# Patient Record
Sex: MALE | Race: WHITE | NOT HISPANIC OR LATINO | Employment: OTHER | URBAN - METROPOLITAN AREA
[De-identification: names, ages, dates, MRNs, and addresses within clinical notes are randomized per-mention and may not be internally consistent; named-entity substitution may affect disease eponyms.]

---

## 2018-05-30 ENCOUNTER — CONVERSION ENCOUNTER (OUTPATIENT)
Dept: SURGERY | Facility: CLINIC | Age: 68
End: 2018-05-30

## 2018-05-30 ENCOUNTER — OFFICE VISIT CONVERTED (OUTPATIENT)
Dept: UROLOGY | Facility: CLINIC | Age: 68
End: 2018-05-30
Attending: UROLOGY

## 2019-07-29 ENCOUNTER — HOSPITAL ENCOUNTER (OUTPATIENT)
Dept: PERIOP | Facility: HOSPITAL | Age: 69
Setting detail: HOSPITAL OUTPATIENT SURGERY
Discharge: HOME | End: 2019-07-29
Attending: UROLOGY

## 2019-07-29 LAB — GLUCOSE BLD-MCNC: 159 MG/DL (ref 70–99)

## 2019-08-19 ENCOUNTER — HOSPITAL ENCOUNTER (OUTPATIENT)
Dept: PERIOP | Facility: HOSPITAL | Age: 69
Setting detail: HOSPITAL OUTPATIENT SURGERY
Discharge: HOME | End: 2019-08-19
Attending: UROLOGY

## 2019-08-19 LAB
GLUCOSE BLD-MCNC: 179 MG/DL (ref 70–99)
GLUCOSE BLD-MCNC: 193 MG/DL (ref 70–99)

## 2019-09-23 ENCOUNTER — HOSPITAL ENCOUNTER (OUTPATIENT)
Dept: ULTRASOUND IMAGING | Facility: HOSPITAL | Age: 69
Discharge: HOME | End: 2019-09-23
Attending: UROLOGY

## 2019-09-25 ENCOUNTER — CONVERSION ENCOUNTER (OUTPATIENT)
Dept: SURGERY | Facility: CLINIC | Age: 69
End: 2019-09-25

## 2019-09-25 ENCOUNTER — OFFICE VISIT CONVERTED (OUTPATIENT)
Dept: UROLOGY | Facility: CLINIC | Age: 69
End: 2019-09-25
Attending: UROLOGY

## 2020-10-30 ENCOUNTER — TELEPHONE CONVERTED (OUTPATIENT)
Dept: UROLOGY | Facility: CLINIC | Age: 70
End: 2020-10-30
Attending: UROLOGY

## 2020-12-02 ENCOUNTER — HOSPITAL ENCOUNTER (OUTPATIENT)
Dept: PREADMISSION TESTING | Facility: HOSPITAL | Age: 70
Discharge: HOME | End: 2020-12-02
Attending: UROLOGY

## 2020-12-04 LAB — SARS-COV-2 RNA SPEC QL NAA+PROBE: NOT DETECTED

## 2020-12-07 ENCOUNTER — HOSPITAL ENCOUNTER (OUTPATIENT)
Dept: PERIOP | Facility: HOSPITAL | Age: 70
Setting detail: HOSPITAL OUTPATIENT SURGERY
Discharge: HOME | End: 2020-12-07
Attending: UROLOGY

## 2020-12-07 LAB
GLUCOSE BLD-MCNC: 164 MG/DL (ref 70–99)
GLUCOSE BLD-MCNC: 173 MG/DL (ref 70–99)

## 2020-12-10 LAB
BACTERIA UR CULT: ABNORMAL
CIPROFLOXACIN SUSC ISLT: <=0.5
DAPTOMYCIN SUSC ISLT: 0.5
DOXYCYCLINE SUSC ISLT: 1
ERYTHROMYCIN SUSC ISLT: 0.5
GENTAMICIN SUSC ISLT: <=0.5
LEVOFLOXACIN SUSC ISLT: <=0.12
NITROFURANTOIN SUSC ISLT: 32
OXACILLIN SUSC ISLT: <=0.25
RIFAMPIN SUSC ISLT: <=0.5
TETRACYCLINE SUSC ISLT: 8
TIGECYCLINE SUSC ISLT: 0.25
TMP SMX SUSC ISLT: 40
VANCOMYCIN SUSC ISLT: 1

## 2020-12-16 LAB
AMM URATE CRY STONE QL IR: 40 %
COLOR STONE: NORMAL
COMPN STONE: NORMAL
CONV CALCULI COMMENT: NORMAL
CONV CALCULI DISCLAIMER: NORMAL
CONV CALCULI NOTE: NORMAL
CONV PHOTO (CALCULI): NORMAL
SIZE STONE: NORMAL MM
URATE MFR STONE: 60 %
WT STONE: 40 MG

## 2020-12-23 ENCOUNTER — TELEPHONE CONVERTED (OUTPATIENT)
Dept: UROLOGY | Facility: CLINIC | Age: 70
End: 2020-12-23
Attending: UROLOGY

## 2021-02-20 ENCOUNTER — HOSPITAL ENCOUNTER (OUTPATIENT)
Dept: OTHER | Facility: HOSPITAL | Age: 71
Discharge: HOME | End: 2021-02-20
Attending: UROLOGY

## 2021-02-20 LAB
ANION GAP SERPL CALC-SCNC: 16 MMOL/L (ref 8–19)
APPEARANCE UR: ABNORMAL
BILIRUB UR QL: NEGATIVE
BUN SERPL-MCNC: 24 MG/DL (ref 5–25)
BUN/CREAT SERPL: 21 {RATIO} (ref 6–20)
CALCIUM SERPL-MCNC: 8.9 MG/DL (ref 8.7–10.4)
CHLORIDE SERPL-SCNC: 103 MMOL/L (ref 99–111)
COLOR UR: YELLOW
CONV BACTERIA: ABNORMAL
CONV CO2: 23 MMOL/L (ref 22–32)
CONV COLLECTION SOURCE (UA): ABNORMAL
CONV HYALINE CASTS IN URINE MICRO: ABNORMAL /[LPF]
CONV UROBILINOGEN IN URINE BY AUTOMATED TEST STRIP: 0.2 {EHRLICHU}/DL (ref 0.1–1)
CREAT UR-MCNC: 1.15 MG/DL (ref 0.7–1.2)
GFR SERPLBLD BASED ON 1.73 SQ M-ARVRAT: >60 ML/MIN/{1.73_M2}
GLUCOSE SERPL-MCNC: 191 MG/DL (ref 70–99)
GLUCOSE UR QL: NEGATIVE MG/DL
HGB UR QL STRIP: ABNORMAL
KETONES UR QL STRIP: ABNORMAL MG/DL
LEUKOCYTE ESTERASE UR QL STRIP: ABNORMAL
NITRITE UR QL STRIP: POSITIVE
OSMOLALITY SERPL CALC.SUM OF ELEC: 295 MOSM/KG (ref 273–304)
PH UR STRIP.AUTO: 8 [PH] (ref 5–8)
POTASSIUM SERPL-SCNC: 4.3 MMOL/L (ref 3.5–5.3)
PROT UR QL: 100 MG/DL
RBC #/AREA URNS HPF: ABNORMAL /[HPF]
SODIUM SERPL-SCNC: 138 MMOL/L (ref 135–147)
SP GR UR: 1.02 (ref 1–1.03)
WBC #/AREA URNS HPF: ABNORMAL /[HPF]

## 2021-02-22 LAB
AMPICILLIN SUSC ISLT: >=32
AMPICILLIN+SULBAC SUSC ISLT: >=32
BACTERIA UR CULT: ABNORMAL
CEFAZOLIN SUSC ISLT: >=64
CEFEPIME SUSC ISLT: <=0.12
CEFTAZIDIME SUSC ISLT: <=1
CEFTRIAXONE SUSC ISLT: <=0.25
CIPROFLOXACIN SUSC ISLT: <=0.25
ERTAPENEM SUSC ISLT: <=0.12
GENTAMICIN SUSC ISLT: <=1
LEVOFLOXACIN SUSC ISLT: 0.5
NITROFURANTOIN SUSC ISLT: 128
PIP+TAZO SUSC ISLT: <=4
TMP SMX SUSC ISLT: <=20
TOBRAMYCIN SUSC ISLT: <=1

## 2021-05-13 NOTE — PROGRESS NOTES
Progress Note      Patient Name: Tate Kelley   Patient ID: [id number]   Sex: Male   Birthdate: Helen 15, 1950        Visit Date: October 30, 2020    Provider: Sarah Maya MD   Location: [facility]   Location Address: [address] [place] [address]  [id number]   Location Phone: [phone]          Chief Complaint  · Pt is here for urological concerns     Telephone Visit- presents for evaluation via telephone.   Verbal consent obtained before beginning visit.   The following staff were present during this visit: Neelima Bocanegra LPN  Total time for encounter: 15 minutes       History Of Present Illness     69-year-old morbidly obese gentleman with numerous medical comorbidities including sleep apnea, COPD, dyslipidemia, hypertension, GERD, spinal stenosis, chronic constipation, report of CKD stage III, and left above-knee amputation for necrotizing fasciitis presents for further follow-up status post left ureteroscopy laser lithotripsy x2 for obstructive ureteral calculi, in July and August.  Patient subsequently underwent stent removal in OR and presents with renal ultrasound prior to today's appointment. Patient states that he has been doing well since stent removal.  Currently denies any urinary symptoms.  Denies hematuria or flank pain.  No complaints today.    Update 10/30/20: Presents for annual appt. States he is doing well from a stone standpoint; doesn't think he has any stones. Has not passed any since last visit. Notes a lingering intermittent right sided discomfort right under rib cage; uncertain why, doesn't think it is associated with stone pain. On occasion urine will be dark to light, associated with liquid intake. Unsure if he is emptying. Doesn't want Flomax, took before and it increased frequency of urination.   Saw nephrology; reports no change in meds or general mgt; will see annually.    _______________  Stone composition:  7/14/2019: 100% uric acid  2/1/2018: 88% uric  acid, 10% calcium oxalate monohydrate    Urine pH:  8/8/2019: 6.5  7/14/2019: 7.0  5/30/2018: 7.5  1/27/2018: 5.0  1/26/2018: 6.0  3/18/2016: 7.5  3/7/2016: 5.5    Creatinine 8/12/2019: 1.13; BUN: 17               Allergy List    Allergies Reconciled  Review of Systems  · Gastrointestinal  o Denies  o : nausea, vomiting, diarrhea  · Genitourinary  o Admits  o : kidney stones              Assessment  · Uric acid kidney stone     274.11/N20.0    Problems Reconciled  Plan  · Orders  o Physician Telephone Evaluation, 11-20 minutes (90805) - 274.11/N20.0 - 10/30/2020  · Medications  o Medications have been Reconciled  o Transition of Care or Provider Policy  · Instructions  o Electronically Identified Patient Education Materials Provided Electronically     Generally doing well  BPH- discussed timed and double voiding; encouraged patient to arrange follow-up if urinary symptoms worsen.  Wishes to avoid medical management    Uric acid stones- discussed again dietary modifications for prevention of stone growth and recurrence including increased hydration, decrease sodium, normal calcium, low animal protein diet.  Patient to maintain urine pH greater than 5.5.  Discussed that pH strips are available at the pharmacy and encourage follow-up with nephrology per routine.    Patient to notify office should he have severe flank pain, persistent vomiting, believing that he is passing stone CT would be obtained.    - patient to f/u nephrology; per routine    all questions addressed    Patient to follow-up in 1 year or earlier as needed               Electronically Signed by: Sarah Maya MD -Author on October 30, 2020 04:54:32 PM

## 2021-05-14 NOTE — PROGRESS NOTES
Progress Note      Patient Name: Tate Kelley   Patient ID: [id number]   Sex: Male   Birthdate: Helen 15, 1950        Visit Date: December 23, 2020    Provider: Sarah Maya MD   Location: [facility]   Location Address: [address] [place] [address]  [id number]   Location Phone: [phone]          Chief Complaint  · Pt is here for urological concerns     Telephone Visit- presents for evaluation via telephone.   Verbal consent obtained before beginning visit.   The following staff were present during this visit: Neelima Bocanegra LPN  Total time for encounter: 15 minutes       History Of Present Illness     69-year-old morbidly obese gentleman with numerous medical comorbidities including sleep apnea, COPD, dyslipidemia, hypertension, GERD, spinal stenosis, chronic constipation, report of CKD stage III, and left above-knee amputation for necrotizing fasciitis presents for further follow-up status post left ureteroscopy laser lithotripsy x2 for obstructive ureteral calculi, in July and August.  Patient subsequently underwent stent removal in OR and presents with renal ultrasound prior to today's appointment. Patient states that he has been doing well since stent removal.  Currently denies any urinary symptoms.  Denies hematuria or flank pain.  No complaints today.    Update 10/30/20: Presents for annual appt. States he is doing well from a stone standpoint; doesn't think he has any stones. Has not passed any since last visit. Notes a lingering intermittent right sided discomfort right under rib cage; uncertain why, doesn't think it is associated with stone pain. On occasion urine will be dark to light, associated with liquid intake. Unsure if he is emptying. Doesn't want Flomax, took before and it increased frequency of urination.   Saw nephrology; reports no change in meds or general mgt; will see annually.    Update 12/23/2020: Patient presents for follow-up after left ureteroscopy, laser  "lithotripsy and stent.  Patient remains with stent in place.  Intrarenal collecting system with challenging visibility due to large amount of intrarenal sediment and dust in all poles; fragments generally very fragile.    Sent home on potassium citrate BID and was instructed to have labs prior to today's visit; had performed at [facility], patient has those results. Never started potassium citrate.   Reports doing \"really good.\"  States Flomax helps his urination; request refill.  Stent remains in place.   Per patient, labs 12/9/20: creatinine 1.23; potassium 4.1; unable to locate urine pH      _______________  Stone composition:  12/16/2020: Uric acid 60%; ammonia acid urate 40%  7/14/2019: 100% uric acid  2/1/2018: 88% uric acid, 10% calcium oxalate monohydrate    Urine pH:  8/8/2019: 6.5  7/14/2019: 7.0  5/30/2018: 7.5  1/27/2018: 5.0  1/26/2018: 6.0  3/18/2016: 7.5  3/7/2016: 5.5    Creatinine 8/12/2019: 1.13; BUN: 17               Past Medical History  DM2 (diabetes mellitus, type 2); Flesh-eating bacteria; Heart Attack; Stroke; Uric acid kidney stone         Past Surgical History  Amputation; Foot surgery; Hernia; Kidney Stone Surgery, Unspecified; Knee surgery         Medication List  Flomax 0.4 mg oral capsule; glipizide 5 mg oral tablet; lisinopril 10 mg oral tablet; metformin 500 mg oral tablet; potassium citrate 10 mEq (1,080 mg) oral tablet extended release         Allergy List  Latex       Allergies Reconciled  Family Medical History  Hypertension; Heart Attack (MI); Prostate cancer; Leukemia         Social History  Tobacco (Never)         Review of Systems  · Constitutional  o Denies  o : fever, chills  · Gastrointestinal  o Denies  o : nausea, vomiting              Assessment  · Uric acid kidney stone     274.11/N20.0    Problems Reconciled  Plan  · Orders  o Physician Telephone Evaluation, 11-20 minutes (22640, [id number]) - 274.11/N20.0 - 12/23/2020  o BMP Non-fasting [facility] (50750) - 274.11/N20.0 - " 01/13/2021  o Urinalysis with Reflex Microscopy if abnormal ([facility]) ([id number]) - 274.11/N20.0 - 01/13/2021  · Medications  o Medications have been Reconciled  o Transition of Care or Provider Policy  · Instructions  o Electronically Identified Patient Education Materials Provided Electronically     Operative findings discussed with patient; left ureteral stent in place due to ample sediment and dust.  Patient agreeable to trial of dissolution therapy via potassium citrate twice daily; notes understanding that this will require routine lab checks of potassium level as well as urine pH.   Aware that due to his comorbidities, medical mgt of stone dz may be limited but agreeable to trial    Start urocitK BID  Check BMP and urinalysis in 3 weeks  Maintain left ureteral stent until follow-up      BPH- discussed timed and double voiding; continue Flomax, refill sent    Follow-up phone visit in 1 month  all questions addressed               Electronically Signed by: Sarah Maya MD -Author on December 23, 2020 01:38:16 PM

## 2021-05-15 VITALS — RESPIRATION RATE: 14 BRPM | WEIGHT: 315 LBS | HEIGHT: 73 IN | BODY MASS INDEX: 41.75 KG/M2

## 2021-05-16 VITALS — RESPIRATION RATE: 16 BRPM | WEIGHT: 315 LBS | BODY MASS INDEX: 41.75 KG/M2 | HEIGHT: 73 IN

## 2021-06-15 RX ORDER — GLIPIZIDE 5 MG/1
TABLET ORAL
COMMUNITY

## 2021-06-15 RX ORDER — LISINOPRIL 10 MG/1
TABLET ORAL
COMMUNITY
End: 2022-04-19 | Stop reason: ALTCHOICE

## 2021-06-15 RX ORDER — POTASSIUM CITRATE 10 MEQ/1
TABLET, EXTENDED RELEASE ORAL
Status: ON HOLD | COMMUNITY
Start: 2020-12-23 | End: 2021-06-28

## 2021-06-15 RX ORDER — TAMSULOSIN HYDROCHLORIDE 0.4 MG/1
CAPSULE ORAL
Status: ON HOLD | COMMUNITY
Start: 2020-12-23 | End: 2021-06-28 | Stop reason: SDUPTHER

## 2021-06-15 NOTE — PROGRESS NOTES
"           UROLOGY OFFICE FOLLOW-UP NOTE    Subjective   HPI  Tate Kelley is a 71 y.o. male morbidly obese gentleman with numerous medical comorbidities including sleep apnea, COPD, dyslipidemia, hypertension, GERD, spinal stenosis, chronic constipation, report of CKD stage III, and left above-knee amputation for necrotizing fasciitis presents for further follow-up status post left ureteroscopy laser lithotripsy x2 for obstructive ureteral calculi, in July and August.  Patient subsequently underwent stent removal in OR and presents with renal ultrasound prior to today's appointment. Patient states that he has been doing well since stent removal.  Currently denies any urinary symptoms.  Denies hematuria or flank pain.  No complaints today.    Update 10/30/20: Presents for annual appt. States he is doing well from a stone standpoint; doesn't think he has any stones. Has not passed any since last visit. Notes a lingering intermittent right sided discomfort right under rib cage; uncertain why, doesn't think it is associated with stone pain. On occasion urine will be dark to light, associated with liquid intake. Unsure if he is emptying. Doesn't want Flomax, took before and it increased frequency of urination.   Saw nephrology; reports no change in meds or general mgt; will see annually.    Update 12/23/2020: Patient presents for follow-up after left ureteroscopy, laser lithotripsy and stent.  Patient remains with stent in place.  Intrarenal collecting system with challenging visibility due to large amount of intrarenal sediment and dust in all poles; fragments generally very fragile.    Sent home on potassium citrate BID and was instructed to have labs prior to today's visit; had performed at [facility], patient has those results. Never started potassium citrate.   Reports doing \"really good.\"  States Flomax helps his urination; request refill.  Stent remains in place.   Per patient, labs 12/9/20: creatinine " 1.23; potassium 4.1; unable to locate urine pH    Update 6/16/21: Presents for follow up after several missed appts; currently has left ureteral stent in place. Recently got back from vacation in [place]. No longer taking flomax.   Took potassium citrate for short time but reports some GI upset so stopped it. Did not have labs prior to today's visit.  Generally feels well.  Unsure when his next nephrology appointment is.      _______________  Stone composition:  12/16/2020: Uric acid 60%; ammonia acid urate 40%  7/14/2019: 100% uric acid  2/1/2018: 88% uric acid, 10% calcium oxalate monohydrate    Urine pH:  8/8/2019: 6.5  7/14/2019: 7.0  5/30/2018: 7.5  1/27/2018: 5.0  1/26/2018: 6.0  3/18/2016: 7.5  3/7/2016: 5.5    Creatinine 8/12/2019: 1.13; BUN: 17             No results found for this or any previous visit.      Medical History:  Past Medical History:   Diagnosis Date   • DM2 (diabetes mellitus, type 2) (CMS/[facility])    • Flesh-eating bacteria (CMS/[facility])    • Heart attack (CMS/[facility])    • Stroke (cerebrum) (CMS/[facility])    • Uric acid kidney stone 09/25/2019        Social History:  Social History     Socioeconomic History   • Marital status:      Spouse name: Not on file   • Number of children: Not on file   • Years of education: Not on file   • Highest education level: Not on file   Tobacco Use   • Smoking status: Never Smoker        Family History:  Family History   Problem Relation Age of Onset   • Hypertension Mother    • Leukemia Mother    • Heart attack Father    • Prostate cancer Father         60        Surgical History:  Past Surgical History:   Procedure Laterality Date   • AMPUTATION      LBN   • FOOT SURGERY     • HERNIA REPAIR     • KIDNEY STONE SURGERY     • KNEE SURGERY     • MOUTH SURGERY          Allergies:  Allergies   Allergen Reactions   • Saccharin Nausea And Vomiting   • Latex Rash        Current Medications:  Current Outpatient Medications   Medication Sig Dispense Refill   • citalopram  "(CeleXA) 20 MG tablet Take 20 mg by mouth Daily.     • metFORMIN ER (GLUCOPHAGE-XR) 500 MG 24 hr tablet      • potassium citrate (UROCIT-K) 10 MEQ (1080 MG) CR tablet potassium citrate 10 mEq (1,080 mg) oral tablet extended release take 1 tablet (10 meq) by oral route 2 times per day 12/23/2020  Active     • tamsulosin (Flomax) 0.4 MG capsule 24 hr capsule Flomax 0.4 mg oral capsule take 1 capsule (0.4 mg) by oral route once daily 1/2 hour following the same meal each day 12/23/2020  Active     • ALBUTEROL IN Inhale 1 puff.     • calcium acetate (PHOS BINDER,) 667 MG capsule capsule Take 2,400 mg by mouth 3 (Three) Times a Day.     • glipizide (GLUCOTROL) 5 MG tablet glipizide 5 mg oral tablet take 1 tablet (5 mg) by oral route once daily before a meal   Active     • lisinopril (PRINIVIL,ZESTRIL) 10 MG tablet lisinopril 10 mg oral tablet take 1/2 tab by oral route QD   Active     • loratadine (CLARITIN) 10 MG tablet Take 10 mg by mouth Daily.     • metFORMIN (GLUCOPHAGE) 500 MG tablet metformin 500 mg oral tablet take 1 tablet (500 mg) by oral route 2 times per day with morning and evening meals   Active     • metoprolol tartrate (LOPRESSOR) 25 MG tablet Take 25 mg by mouth Daily.     • ondansetron (ZOFRAN) 4 MG tablet Take 4 mg by mouth.     • promethazine (PHENERGAN) 12.5 MG tablet Take 12.5 mg by mouth.     • sodium bicarbonate 325 MG tablet Take 1,300 mg by mouth 3 (Three) Times a Day.     • sulfamethoxazole-trimethoprim (BACTRIM DS,SEPTRA DS) 800-160 MG per tablet        No current facility-administered medications for this visit.       Review of systems  Constitutional: Denies fever chills  GI: Denies nausea, vomiting    Objective     Vital Signs:   Resp 18   Ht 185.4 cm (72.99\")   BMI 43.55 kg/m²       Physical exam  No acute distress, well-nourished morbidly obese  Lungs: Clear, unlabored  CV: Regular rate, no chest retractions  Awake alert and oriented  Mood normal; affect normal  Extremities: left " aka    Problem List:  Patient Active Problem List   Diagnosis   • DM2 (diabetes mellitus, type 2) (CMS/[facility])   • Flesh-eating bacteria (CMS/[facility])   • Heart attack (CMS/[facility])   • GI bleed   • Stroke (CMS/[facility])   • Uric acid urolithiasis   • Nephrolithiasis   • Retained ureteral stent       Assessment/Plan   Diagnoses and all orders for this visit:    1. Uric acid urolithiasis (Primary)  -     Urinalysis With Microscopic If Indicated (No Culture) - Urine, Clean Catch; Future  -     Urinalysis With Microscopic If Indicated (No Culture) - Urine, Clean Catch    2. Dysuria  -     Urine Culture - Urine, Urine, Clean Catch; Future  -     Urinalysis With Microscopic If Indicated (No Culture) - Urine, Clean Catch; Future  -     Urine Culture - Urine, Urine, Clean Catch  -     Urinalysis With Microscopic If Indicated (No Culture) - Urine, Clean Catch           Patient with failed dissolution therapy; unable to tolerate or take potassium citrate for appropriate period of time for dissolution; discussed the importance of partnering with nephrology for medical optimization of metabolic stone disease, uric acid stone formation.  Notes understanding that without medical management for metabolic stone disease will likely have recurrence of obstructive uropathy due to uric acid stones.    Given that patient stent has been in for a prolonged period of time, recommend proceeding to the OR for cystoscopy left retrograde pyelogram, ureteroscopy, laser lithotripsy, and stent exchange versus removal.  Risk, benefits, and alternatives of the procedure discussed with patient including but not limited to bleeding, infection, damage to surrounding structures, pain, stone recurrence, stricture.  Patient also notes understanding that if unable to reach the level of the stone or if significant purulent discharge noted upon initiation of procedure that stent will be placed in definitive stone management will be deferred until the collecting system is  optimized.  Patient does understand that her stent is only a temporizing measure and if left in place could cause loss or damage of kidney     Will schedule OR.   All question addressed at this time.          Signed:  Sarah Palacios MD  06/16/21  16:14 EDT      [facility] moderate: 1 chronic illness with exacerbation, progression; decision regarding surgery with identified patient or procedure risk factors

## 2021-06-16 ENCOUNTER — OFFICE VISIT (OUTPATIENT)
Dept: UROLOGY | Facility: CLINIC | Age: 71
End: 2021-06-16

## 2021-06-16 ENCOUNTER — PREP FOR SURGERY (OUTPATIENT)
Dept: OTHER | Facility: HOSPITAL | Age: 71
End: 2021-06-16

## 2021-06-16 VITALS — HEIGHT: 73 IN | BODY MASS INDEX: 43.55 KG/M2 | RESPIRATION RATE: 18 BRPM

## 2021-06-16 DIAGNOSIS — N20.0 NEPHROLITHIASIS: Primary | ICD-10-CM

## 2021-06-16 DIAGNOSIS — R30.0 DYSURIA: ICD-10-CM

## 2021-06-16 DIAGNOSIS — Z96.0 RETAINED URETERAL STENT: ICD-10-CM

## 2021-06-16 DIAGNOSIS — N20.9 URIC ACID UROLITHIASIS: Primary | ICD-10-CM

## 2021-06-16 PROBLEM — I63.9 STROKE: Status: ACTIVE | Noted: 2021-06-16

## 2021-06-16 PROBLEM — M72.6 FLESH-EATING BACTERIA (HCC): Status: ACTIVE | Noted: 2021-06-16

## 2021-06-16 PROBLEM — I21.9 HEART ATTACK: Status: ACTIVE | Noted: 2021-06-16

## 2021-06-16 PROBLEM — E11.9 DM2 (DIABETES MELLITUS, TYPE 2): Status: ACTIVE | Noted: 2021-06-16

## 2021-06-16 PROCEDURE — 87147 CULTURE TYPE IMMUNOLOGIC: CPT | Performed by: UROLOGY

## 2021-06-16 PROCEDURE — 99214 OFFICE O/P EST MOD 30 MIN: CPT | Performed by: UROLOGY

## 2021-06-16 PROCEDURE — 87086 URINE CULTURE/COLONY COUNT: CPT | Performed by: UROLOGY

## 2021-06-16 RX ORDER — CEFAZOLIN SODIUM IN 0.9 % NACL 3 G/100 ML
3 INTRAVENOUS SOLUTION, PIGGYBACK (ML) INTRAVENOUS ONCE
Status: CANCELLED | OUTPATIENT
Start: 2021-06-16 | End: 2021-06-16

## 2021-06-16 RX ORDER — CALCIUM ACETATE 667 MG/1
2400 CAPSULE ORAL 3 TIMES DAILY
Status: ON HOLD | COMMUNITY
End: 2021-06-28

## 2021-06-16 RX ORDER — ONDANSETRON 4 MG/1
4 TABLET, FILM COATED ORAL
Status: ON HOLD | COMMUNITY
End: 2021-06-28

## 2021-06-16 RX ORDER — PROMETHAZINE HYDROCHLORIDE 12.5 MG/1
12.5 TABLET ORAL
Status: ON HOLD | COMMUNITY
End: 2021-06-28

## 2021-06-16 RX ORDER — SULFAMETHOXAZOLE AND TRIMETHOPRIM 800; 160 MG/1; MG/1
TABLET ORAL
Status: ON HOLD | COMMUNITY
Start: 2021-03-17 | End: 2021-06-28

## 2021-06-16 RX ORDER — CITALOPRAM 20 MG/1
20 TABLET ORAL DAILY
Status: ON HOLD | COMMUNITY
End: 2021-06-28

## 2021-06-16 RX ORDER — LORATADINE 10 MG/1
10 TABLET ORAL DAILY
Status: ON HOLD | COMMUNITY
End: 2021-06-28

## 2021-06-16 RX ORDER — METFORMIN HYDROCHLORIDE 500 MG/1
TABLET, EXTENDED RELEASE ORAL
Status: ON HOLD | COMMUNITY
Start: 2021-05-12 | End: 2021-06-28

## 2021-06-16 RX ORDER — SODIUM BICARBONATE 325 MG/1
1300 TABLET ORAL 3 TIMES DAILY
Status: ON HOLD | COMMUNITY
End: 2021-06-28

## 2021-06-17 LAB — BACTERIA SPEC AEROBE CULT: ABNORMAL

## 2021-06-24 RX ORDER — PSEUDOEPHEDRINE HYDROCHLORIDE 60 MG/1
60 TABLET, FILM COATED ORAL 2 TIMES DAILY PRN
COMMUNITY
End: 2022-04-19 | Stop reason: ALTCHOICE

## 2021-06-28 ENCOUNTER — HOSPITAL ENCOUNTER (OUTPATIENT)
Facility: HOSPITAL | Age: 71
Setting detail: HOSPITAL OUTPATIENT SURGERY
Discharge: HOME | End: 2021-06-28
Attending: UROLOGY | Admitting: UROLOGY

## 2021-06-28 ENCOUNTER — ANESTHESIA EVENT (OUTPATIENT)
Dept: PERIOP | Facility: HOSPITAL | Age: 71
End: 2021-06-28

## 2021-06-28 ENCOUNTER — ANESTHESIA (OUTPATIENT)
Dept: PERIOP | Facility: HOSPITAL | Age: 71
End: 2021-06-28

## 2021-06-28 ENCOUNTER — APPOINTMENT (OUTPATIENT)
Dept: GENERAL RADIOLOGY | Facility: HOSPITAL | Age: 71
End: 2021-06-28

## 2021-06-28 VITALS
SYSTOLIC BLOOD PRESSURE: 118 MMHG | WEIGHT: 315 LBS | BODY MASS INDEX: 42.66 KG/M2 | RESPIRATION RATE: 20 BRPM | OXYGEN SATURATION: 92 % | DIASTOLIC BLOOD PRESSURE: 60 MMHG | HEART RATE: 95 BPM | HEIGHT: 72 IN | TEMPERATURE: 97.1 F

## 2021-06-28 DIAGNOSIS — N20.0 NEPHROLITHIASIS: ICD-10-CM

## 2021-06-28 DIAGNOSIS — Z96.0 RETAINED URETERAL STENT: ICD-10-CM

## 2021-06-28 DIAGNOSIS — N20.0 URIC ACID KIDNEY STONE: Primary | ICD-10-CM

## 2021-06-28 LAB
ANION GAP SERPL CALCULATED.3IONS-SCNC: 9.7 MMOL/L (ref 5–15)
BUN SERPL-MCNC: 19 MG/DL (ref 8–23)
BUN/CREAT SERPL: 16.2 (ref 7–25)
CALCIUM SPEC-SCNC: 9.2 MG/DL (ref 8.6–10.5)
CHLORIDE SERPL-SCNC: 104 MMOL/L (ref 98–107)
CO2 SERPL-SCNC: 23.3 MMOL/L (ref 22–29)
CREAT SERPL-MCNC: 1.17 MG/DL (ref 0.76–1.27)
GFR SERPL CREATININE-BSD FRML MDRD: 62 ML/MIN/1.73
GLUCOSE BLDC GLUCOMTR-MCNC: 163 MG/DL (ref 70–130)
GLUCOSE SERPL-MCNC: 154 MG/DL (ref 65–99)
LAB AP CASE REPORT: NORMAL
LAB AP CLINICAL INFORMATION: NORMAL
PATH REPORT.FINAL DX SPEC: NORMAL
PATH REPORT.GROSS SPEC: NORMAL
POTASSIUM SERPL-SCNC: 4.3 MMOL/L (ref 3.5–5.2)
SODIUM SERPL-SCNC: 137 MMOL/L (ref 136–145)

## 2021-06-28 PROCEDURE — C1769 GUIDE WIRE: HCPCS | Performed by: UROLOGY

## 2021-06-28 PROCEDURE — 74420 UROGRAPHY RTRGR +-KUB: CPT | Performed by: RADIOLOGY

## 2021-06-28 PROCEDURE — 88300 SURGICAL PATH GROSS: CPT | Performed by: UROLOGY

## 2021-06-28 PROCEDURE — 25010000002 PROPOFOL 10 MG/ML EMULSION: Performed by: NURSE ANESTHETIST, CERTIFIED REGISTERED

## 2021-06-28 PROCEDURE — C1894 INTRO/SHEATH, NON-LASER: HCPCS | Performed by: UROLOGY

## 2021-06-28 PROCEDURE — 82962 GLUCOSE BLOOD TEST: CPT

## 2021-06-28 PROCEDURE — 52332 CYSTOSCOPY AND TREATMENT: CPT | Performed by: UROLOGY

## 2021-06-28 PROCEDURE — 80048 BASIC METABOLIC PNL TOTAL CA: CPT | Performed by: UROLOGY

## 2021-06-28 PROCEDURE — 25010000002 FENTANYL CITRATE (PF) 50 MCG/ML SOLUTION: Performed by: NURSE ANESTHETIST, CERTIFIED REGISTERED

## 2021-06-28 PROCEDURE — 25010000002 DEXAMETHASONE PER 1 MG: Performed by: NURSE ANESTHETIST, CERTIFIED REGISTERED

## 2021-06-28 PROCEDURE — 25010000002 METOCLOPRAMIDE PER 10 MG: Performed by: ANESTHESIOLOGY

## 2021-06-28 PROCEDURE — 25010000002 MIDAZOLAM PER 1MG: Performed by: ANESTHESIOLOGY

## 2021-06-28 PROCEDURE — 74420 UROGRAPHY RTRGR +-KUB: CPT

## 2021-06-28 PROCEDURE — 0 IOPAMIDOL PER 1 ML: Performed by: UROLOGY

## 2021-06-28 PROCEDURE — C1758 CATHETER, URETERAL: HCPCS | Performed by: UROLOGY

## 2021-06-28 PROCEDURE — 25010000002 ONDANSETRON PER 1 MG: Performed by: NURSE ANESTHETIST, CERTIFIED REGISTERED

## 2021-06-28 PROCEDURE — C2617 STENT, NON-COR, TEM W/O DEL: HCPCS | Performed by: UROLOGY

## 2021-06-28 PROCEDURE — 52352 CYSTOURETERO W/STONE REMOVE: CPT | Performed by: UROLOGY

## 2021-06-28 PROCEDURE — 82365 CALCULUS SPECTROSCOPY: CPT | Performed by: UROLOGY

## 2021-06-28 DEVICE — STNT URETRL CLASSC DBL PIG 6F 26CM: Type: IMPLANTABLE DEVICE | Site: URETER | Status: FUNCTIONAL

## 2021-06-28 RX ORDER — DEXAMETHASONE SODIUM PHOSPHATE 4 MG/ML
INJECTION, SOLUTION INTRA-ARTICULAR; INTRALESIONAL; INTRAMUSCULAR; INTRAVENOUS; SOFT TISSUE AS NEEDED
Status: DISCONTINUED | OUTPATIENT
Start: 2021-06-28 | End: 2021-06-28 | Stop reason: SURG

## 2021-06-28 RX ORDER — IBUPROFEN 600 MG/1
600 TABLET ORAL EVERY 6 HOURS PRN
Status: DISCONTINUED | OUTPATIENT
Start: 2021-06-28 | End: 2021-06-28 | Stop reason: HOSPADM

## 2021-06-28 RX ORDER — ALBUTEROL SULFATE 2.5 MG/3ML
SOLUTION RESPIRATORY (INHALATION) AS NEEDED
Status: DISCONTINUED | OUTPATIENT
Start: 2021-06-28 | End: 2021-06-28 | Stop reason: SURG

## 2021-06-28 RX ORDER — ACETAMINOPHEN 325 MG/1
650 TABLET ORAL ONCE
Status: DISCONTINUED | OUTPATIENT
Start: 2021-06-28 | End: 2021-06-28 | Stop reason: HOSPADM

## 2021-06-28 RX ORDER — TAMSULOSIN HYDROCHLORIDE 0.4 MG/1
1 CAPSULE ORAL DAILY
Qty: 90 CAPSULE | Refills: 1 | Status: SHIPPED | OUTPATIENT
Start: 2021-06-28 | End: 2022-04-19 | Stop reason: SDUPTHER

## 2021-06-28 RX ORDER — DIAZEPAM 10 MG/1
10 TABLET ORAL ONCE
Qty: 1 TABLET | Refills: 0 | Status: SHIPPED | OUTPATIENT
Start: 2021-06-28 | End: 2021-06-28

## 2021-06-28 RX ORDER — OXYCODONE HYDROCHLORIDE AND ACETAMINOPHEN 5; 325 MG/1; MG/1
1-2 TABLET ORAL EVERY 4 HOURS PRN
Qty: 14 TABLET | Refills: 0 | Status: SHIPPED | OUTPATIENT
Start: 2021-06-28 | End: 2022-04-19

## 2021-06-28 RX ORDER — FAMOTIDINE 10 MG/ML
20 INJECTION, SOLUTION INTRAVENOUS
Status: COMPLETED | OUTPATIENT
Start: 2021-06-28 | End: 2021-06-28

## 2021-06-28 RX ORDER — PROMETHAZINE HYDROCHLORIDE 25 MG/1
25 SUPPOSITORY RECTAL ONCE AS NEEDED
Status: DISCONTINUED | OUTPATIENT
Start: 2021-06-28 | End: 2021-06-28 | Stop reason: HOSPADM

## 2021-06-28 RX ORDER — ONDANSETRON 2 MG/ML
INJECTION INTRAMUSCULAR; INTRAVENOUS AS NEEDED
Status: DISCONTINUED | OUTPATIENT
Start: 2021-06-28 | End: 2021-06-28 | Stop reason: SURG

## 2021-06-28 RX ORDER — PROPOFOL 10 MG/ML
VIAL (ML) INTRAVENOUS AS NEEDED
Status: DISCONTINUED | OUTPATIENT
Start: 2021-06-28 | End: 2021-06-28 | Stop reason: SURG

## 2021-06-28 RX ORDER — DEXMEDETOMIDINE HYDROCHLORIDE 100 UG/ML
INJECTION, SOLUTION INTRAVENOUS AS NEEDED
Status: DISCONTINUED | OUTPATIENT
Start: 2021-06-28 | End: 2021-06-28 | Stop reason: SURG

## 2021-06-28 RX ORDER — MEPERIDINE HYDROCHLORIDE 25 MG/ML
12.5 INJECTION INTRAMUSCULAR; INTRAVENOUS; SUBCUTANEOUS
Status: DISCONTINUED | OUTPATIENT
Start: 2021-06-28 | End: 2021-06-28 | Stop reason: HOSPADM

## 2021-06-28 RX ORDER — SODIUM CHLORIDE, SODIUM LACTATE, POTASSIUM CHLORIDE, CALCIUM CHLORIDE 600; 310; 30; 20 MG/100ML; MG/100ML; MG/100ML; MG/100ML
9 INJECTION, SOLUTION INTRAVENOUS CONTINUOUS PRN
Status: DISCONTINUED | OUTPATIENT
Start: 2021-06-28 | End: 2021-06-28 | Stop reason: HOSPADM

## 2021-06-28 RX ORDER — LEVOFLOXACIN 500 MG/1
500 TABLET, FILM COATED ORAL DAILY
Qty: 10 TABLET | Refills: 0 | Status: SHIPPED | OUTPATIENT
Start: 2021-06-29 | End: 2021-07-09

## 2021-06-28 RX ORDER — ONDANSETRON 2 MG/ML
4 INJECTION INTRAMUSCULAR; INTRAVENOUS ONCE AS NEEDED
Status: DISCONTINUED | OUTPATIENT
Start: 2021-06-28 | End: 2021-06-28 | Stop reason: HOSPADM

## 2021-06-28 RX ORDER — SODIUM CHLORIDE 0.9 % (FLUSH) 0.9 %
10 SYRINGE (ML) INJECTION AS NEEDED
Status: DISCONTINUED | OUTPATIENT
Start: 2021-06-28 | End: 2021-06-28 | Stop reason: HOSPADM

## 2021-06-28 RX ORDER — FENTANYL CITRATE 50 UG/ML
INJECTION, SOLUTION INTRAMUSCULAR; INTRAVENOUS AS NEEDED
Status: DISCONTINUED | OUTPATIENT
Start: 2021-06-28 | End: 2021-06-28 | Stop reason: SURG

## 2021-06-28 RX ORDER — OXYCODONE HYDROCHLORIDE 5 MG/1
5 TABLET ORAL
Status: DISCONTINUED | OUTPATIENT
Start: 2021-06-28 | End: 2021-06-28 | Stop reason: HOSPADM

## 2021-06-28 RX ORDER — ROCURONIUM BROMIDE 10 MG/ML
INJECTION, SOLUTION INTRAVENOUS AS NEEDED
Status: DISCONTINUED | OUTPATIENT
Start: 2021-06-28 | End: 2021-06-28 | Stop reason: SURG

## 2021-06-28 RX ORDER — GLYCOPYRROLATE 0.2 MG/ML
0.2 INJECTION INTRAMUSCULAR; INTRAVENOUS
Status: COMPLETED | OUTPATIENT
Start: 2021-06-28 | End: 2021-06-28

## 2021-06-28 RX ORDER — CEFAZOLIN SODIUM IN 0.9 % NACL 3 G/100 ML
3 INTRAVENOUS SOLUTION, PIGGYBACK (ML) INTRAVENOUS ONCE
Status: COMPLETED | OUTPATIENT
Start: 2021-06-28 | End: 2021-06-28

## 2021-06-28 RX ORDER — ONDANSETRON 4 MG/1
4 TABLET, FILM COATED ORAL ONCE AS NEEDED
Status: DISCONTINUED | OUTPATIENT
Start: 2021-06-28 | End: 2021-06-28 | Stop reason: HOSPADM

## 2021-06-28 RX ORDER — SUCCINYLCHOLINE/SOD CL,ISO/PF 100 MG/5ML
SYRINGE (ML) INTRAVENOUS AS NEEDED
Status: DISCONTINUED | OUTPATIENT
Start: 2021-06-28 | End: 2021-06-28 | Stop reason: SURG

## 2021-06-28 RX ORDER — MIDAZOLAM HYDROCHLORIDE 1 MG/ML
2 INJECTION INTRAMUSCULAR; INTRAVENOUS ONCE
Status: COMPLETED | OUTPATIENT
Start: 2021-06-28 | End: 2021-06-28

## 2021-06-28 RX ORDER — MAGNESIUM HYDROXIDE 1200 MG/15ML
LIQUID ORAL AS NEEDED
Status: DISCONTINUED | OUTPATIENT
Start: 2021-06-28 | End: 2021-06-28 | Stop reason: HOSPADM

## 2021-06-28 RX ORDER — PROMETHAZINE HYDROCHLORIDE 12.5 MG/1
25 TABLET ORAL ONCE AS NEEDED
Status: DISCONTINUED | OUTPATIENT
Start: 2021-06-28 | End: 2021-06-28 | Stop reason: HOSPADM

## 2021-06-28 RX ORDER — METOCLOPRAMIDE HYDROCHLORIDE 5 MG/ML
10 INJECTION INTRAMUSCULAR; INTRAVENOUS ONCE AS NEEDED
Status: COMPLETED | OUTPATIENT
Start: 2021-06-28 | End: 2021-06-28

## 2021-06-28 RX ORDER — LABETALOL HYDROCHLORIDE 5 MG/ML
INJECTION, SOLUTION INTRAVENOUS AS NEEDED
Status: DISCONTINUED | OUTPATIENT
Start: 2021-06-28 | End: 2021-06-28 | Stop reason: SURG

## 2021-06-28 RX ORDER — PROMETHAZINE HYDROCHLORIDE 12.5 MG/1
12.5 TABLET ORAL ONCE AS NEEDED
Status: DISCONTINUED | OUTPATIENT
Start: 2021-06-28 | End: 2021-06-28 | Stop reason: HOSPADM

## 2021-06-28 RX ORDER — SODIUM CHLORIDE 0.9 % (FLUSH) 0.9 %
10 SYRINGE (ML) INJECTION EVERY 12 HOURS SCHEDULED
Status: DISCONTINUED | OUTPATIENT
Start: 2021-06-28 | End: 2021-06-28 | Stop reason: HOSPADM

## 2021-06-28 RX ORDER — ACETAMINOPHEN 500 MG
1000 TABLET ORAL ONCE
Status: COMPLETED | OUTPATIENT
Start: 2021-06-28 | End: 2021-06-28

## 2021-06-28 RX ORDER — ASPIRIN 325 MG
325 TABLET ORAL DAILY
COMMUNITY

## 2021-06-28 RX ADMIN — ONDANSETRON 4 MG: 2 INJECTION INTRAMUSCULAR; INTRAVENOUS at 08:11

## 2021-06-28 RX ADMIN — ACETAMINOPHEN 1000 MG: 500 TABLET ORAL at 07:16

## 2021-06-28 RX ADMIN — ROCURONIUM BROMIDE 10 MG: 10 INJECTION INTRAVENOUS at 07:41

## 2021-06-28 RX ADMIN — DEXMEDETOMIDINE HYDROCHLORIDE 10 MCG: 100 INJECTION, SOLUTION INTRAVENOUS at 08:13

## 2021-06-28 RX ADMIN — CEFAZOLIN 3 G: 1 INJECTION, POWDER, FOR SOLUTION INTRAMUSCULAR; INTRAVENOUS; PARENTERAL at 07:31

## 2021-06-28 RX ADMIN — GLYCOPYRROLATE 0.2 MG: 0.2 INJECTION INTRAMUSCULAR; INTRAVENOUS at 07:19

## 2021-06-28 RX ADMIN — FENTANYL CITRATE 100 MCG: 50 INJECTION INTRAMUSCULAR; INTRAVENOUS at 07:33

## 2021-06-28 RX ADMIN — ALBUTEROL SULFATE 2.5 MG: 2.5 SOLUTION RESPIRATORY (INHALATION) at 08:15

## 2021-06-28 RX ADMIN — SODIUM CHLORIDE, POTASSIUM CHLORIDE, SODIUM LACTATE AND CALCIUM CHLORIDE 9 ML/HR: 600; 310; 30; 20 INJECTION, SOLUTION INTRAVENOUS at 07:20

## 2021-06-28 RX ADMIN — FAMOTIDINE 20 MG: 10 INJECTION INTRAVENOUS at 07:19

## 2021-06-28 RX ADMIN — METOCLOPRAMIDE HYDROCHLORIDE 10 MG: 5 INJECTION INTRAMUSCULAR; INTRAVENOUS at 07:19

## 2021-06-28 RX ADMIN — DEXAMETHASONE SODIUM PHOSPHATE 4 MG: 4 INJECTION INTRA-ARTICULAR; INTRALESIONAL; INTRAMUSCULAR; INTRAVENOUS; SOFT TISSUE at 08:11

## 2021-06-28 RX ADMIN — Medication 200 MG: at 07:41

## 2021-06-28 RX ADMIN — LABETALOL 20 MG/4 ML (5 MG/ML) INTRAVENOUS SYRINGE 10 MG: at 07:40

## 2021-06-28 RX ADMIN — MIDAZOLAM HYDROCHLORIDE 2 MG: 1 INJECTION, SOLUTION INTRAMUSCULAR; INTRAVENOUS at 07:19

## 2021-06-28 RX ADMIN — PROPOFOL 300 MG: 10 INJECTION, EMULSION INTRAVENOUS at 07:41

## 2021-06-28 NOTE — ANESTHESIA POSTPROCEDURE EVALUATION
Patient: Tate Kelley    Procedure Summary     Date: 06/28/21 Room / Location: [facility] OR 07 / [facility] MAIN OR    Anesthesia Start: 0731 Anesthesia Stop: 0904    Procedure: CYSTOSCOPY URETEROSCOPY RETROGRADE PYELOGRAM STENT EXCHANGE LEFT (Left Ureter) Diagnosis:       Nephrolithiasis      Retained ureteral stent      (Nephrolithiasis [N20.0])      (Retained ureteral stent [Z96.0])    Surgeons: Sarah Palacios MD Provider: Haile Hoang MD    Anesthesia Type: general ASA Status: 4          Anesthesia Type: general    Vitals  Vitals Value Taken Time   BP 96/54 06/28/21 0924   Temp 36.7 °C (98.1 °F) 06/28/21 0902   Pulse 93 06/28/21 0927   Resp 18 06/28/21 0912   SpO2 93 % 06/28/21 0927   Vitals shown include unvalidated device data.        Post Anesthesia Care and Evaluation    Patient location during evaluation: bedside  Patient participation: complete - patient participated  Level of consciousness: awake  Pain score: 0  Pain management: adequate  Airway patency: patent  Anesthetic complications: No anesthetic complications  PONV Status: none  Cardiovascular status: acceptable and stable  Respiratory status: acceptable and room air  Hydration status: acceptable

## 2021-06-28 NOTE — ANESTHESIA PREPROCEDURE EVALUATION
Anesthesia Evaluation     Patient summary reviewed and Nursing notes reviewed   no history of anesthetic complications:  NPO Solid Status: > 8 hours  NPO Liquid Status: > 2 hours           Airway   Mallampati: III  TM distance: >3 FB  Neck ROM: full  No difficulty expected  Dental    (+) poor dentition    Pulmonary    (+) COPD, sleep apnea, rhonchi, wheezes,   Cardiovascular - normal exam  Exercise tolerance: good (4-7 METS)    Rhythm: regular    (+) past MI , hyperlipidemia,       Neuro/Psych  (+) CVA,     GI/Hepatic/Renal/Endo    (+) morbid obesity, GERD, GI bleeding , renal disease, diabetes mellitus,     Musculoskeletal     Abdominal   (+) obese,    Substance History - negative use     OB/GYN negative ob/gyn ROS         Other   arthritis,                      Anesthesia Plan    ASA 4     general   (Patient understands anesthesia not responsible for dental damage.)  intravenous induction     Anesthetic plan, all risks, benefits, and alternatives have been provided, discussed and informed consent has been obtained with: patient.  Use of blood products discussed with patient .   Plan discussed with CRNA.

## 2021-07-01 ENCOUNTER — TELEPHONE (OUTPATIENT)
Dept: UROLOGY | Facility: CLINIC | Age: 71
End: 2021-07-01

## 2021-07-01 DIAGNOSIS — R97.20 ELEVATED PROSTATE SPECIFIC ANTIGEN (PSA): Primary | ICD-10-CM

## 2021-07-02 ENCOUNTER — TELEPHONE (OUTPATIENT)
Dept: UROLOGY | Facility: CLINIC | Age: 71
End: 2021-07-02

## 2021-07-02 NOTE — TELEPHONE ENCOUNTER
Pt called in today, stating that he was instructed to see nephrology and wanted to know who in our office you would recommend. I told him we don't have nephrology in our office so he wanted to know who you suggested he see in regards to obtaining a nephrology consultation. He values your opinion and wants your recommendation for a nephrologist.

## 2021-07-09 LAB
AMM URATE MFR STONE: 90 %
COLOR STONE: NORMAL
COMPN STONE: NORMAL
LABORATORY COMMENT REPORT: NORMAL
Lab: NORMAL
Lab: NORMAL
PHOTO: NORMAL
SIZE STONE: NORMAL MM
SPEC SOURCE SUBJ: NORMAL
URATE MFR STONE: 10 %
WT STONE: 81 MG

## 2021-07-12 RX ORDER — DIAZEPAM 10 MG/1
TABLET ORAL
COMMUNITY
Start: 2021-06-28

## 2021-07-13 PROBLEM — T19.9XXA FOREIGN BODY IN GENITOURINARY TRACT: Status: ACTIVE | Noted: 2021-07-13

## 2021-07-13 PROBLEM — T19.9XXA FOREIGN BODY IN GENITOURINARY TRACT: Status: RESOLVED | Noted: 2021-07-13 | Resolved: 2021-07-13

## 2021-07-13 NOTE — PROGRESS NOTES
Preoperative diagnosis  Foreign body in genitourinary tract; hydronephrosis    Postoperative diagnosis  Same as above    Procedure  Flexible cystourethroscopy with stent removal    Attending surgeon  Sarah Palacios MD     Anesthesia  2% lidocaine jelly intraurethrally    Complications  None    Specimen  None    Estimated blood loss  0cc    Indications  71 y.o. male who is status post  ureteroscopy with stone treatment who presents for stent removal.    Procedure  The patient was appropriately identified and brought to the cytoscopy suite. A timeout was undertaken documenting the correct patient, site, and procedure. The patient was prepped in a normal sterile fashion. A flexible cytoscope was then introduced into the bladder. The stent was identified and grasped with endoscopic graspers. The entire stent was removed and passed off the field. Patient tolerated the procedure well. There were no intraprocedural complications.     Plan  Provided education regarding water intake of at least 2 liters per day  Encouraged upcoming appointment with nephrology for metabolic stone management; will send records to Dr. Rosenberg for review  F/u in 4-6 weeks with a renal ultrasound prior

## 2021-07-15 ENCOUNTER — OFFICE VISIT (OUTPATIENT)
Dept: UROLOGY | Facility: CLINIC | Age: 71
End: 2021-07-15

## 2021-07-15 VITALS — WEIGHT: 315 LBS | HEIGHT: 72 IN | BODY MASS INDEX: 42.66 KG/M2 | RESPIRATION RATE: 18 BRPM

## 2021-07-15 DIAGNOSIS — T19.9XXA FOREIGN BODY IN GENITOURINARY TRACT, INITIAL ENCOUNTER: ICD-10-CM

## 2021-07-15 DIAGNOSIS — N20.0 URIC ACID KIDNEY STONE: Primary | ICD-10-CM

## 2021-07-15 PROCEDURE — 52310 CYSTOSCOPY AND TREATMENT: CPT | Performed by: UROLOGY

## 2021-07-15 RX ORDER — METFORMIN HYDROCHLORIDE 500 MG/1
TABLET, EXTENDED RELEASE ORAL
COMMUNITY
Start: 2021-06-30

## 2021-07-15 RX ORDER — PSEUDOEPHEDRINE HCL 30 MG/1
TABLET, FILM COATED ORAL
COMMUNITY
Start: 2021-06-30

## 2021-07-15 RX ORDER — UREA 20 %
CREAM (GRAM) TOPICAL
COMMUNITY
Start: 2021-06-30

## 2021-07-26 ENCOUNTER — HOSPITAL ENCOUNTER (OUTPATIENT)
Dept: ULTRASOUND IMAGING | Facility: HOSPITAL | Age: 71
Discharge: HOME | End: 2021-07-26
Admitting: UROLOGY

## 2021-07-26 DIAGNOSIS — N20.0 URIC ACID KIDNEY STONE: ICD-10-CM

## 2021-07-26 PROCEDURE — 76775 US EXAM ABDO BACK WALL LIM: CPT

## 2022-01-07 PROCEDURE — U0004 COV-19 TEST NON-CDC HGH THRU: HCPCS | Performed by: FAMILY MEDICINE

## 2022-01-07 PROCEDURE — U0005 INFEC AGEN DETEC AMPLI PROBE: HCPCS | Performed by: FAMILY MEDICINE

## 2022-01-08 ENCOUNTER — TELEPHONE (OUTPATIENT)
Dept: URGENT CARE | Facility: CLINIC | Age: 72
End: 2022-01-08

## 2022-01-08 NOTE — TELEPHONE ENCOUNTER
----- Message from ROGERIO Porter sent at 1/8/2022  9:41 AM EST -----  Please notify patient of negative result

## 2022-02-17 ENCOUNTER — TELEPHONE (OUTPATIENT)
Dept: UROLOGY | Facility: CLINIC | Age: 72
End: 2022-02-17

## 2022-02-17 NOTE — TELEPHONE ENCOUNTER
Patient stated that he would bring copy of his PSA to appt, he had this done 2 weeks ago and completed at the [facility] in [place], [place]

## 2022-02-17 NOTE — TELEPHONE ENCOUNTER
----- Message from Veronica Antoine sent at 2/15/2022  3:23 PM EST -----  Regarding: NEEDS PSA DONE  PLEASE CALL PT AND MAKE SURE HE HAS HAD HIS PSA DONE. THIS NEEDS DONE BEFORE HIS 3/10 APPT (NO LATER THAN 3/6 SO WE CAN GET RESULTS). IF HE HAS HAD IT DONE SOMEWHERE ELSE, FIND OUT WHEN AND WHERE, AND CALL THEM TO GET THE RESULTS SENT TO US. THANK YOU!  ----- Message -----  From: SYSTEM  Sent: 1/15/2022   1:36 AM EST  To: [facility] [place] [facility] Clinical Pool

## 2022-03-09 PROBLEM — R60.0 LOCALIZED EDEMA: Status: ACTIVE | Noted: 2022-02-09

## 2022-03-09 PROBLEM — I10 HYPERTENSION: Status: ACTIVE | Noted: 2022-02-09

## 2022-03-09 PROBLEM — R80.1 PERSISTENT PROTEINURIA: Status: ACTIVE | Noted: 2022-02-09

## 2022-03-09 PROBLEM — N18.32 STAGE 3B CHRONIC KIDNEY DISEASE (HCC): Status: ACTIVE | Noted: 2022-02-09

## 2022-03-10 ENCOUNTER — TELEPHONE (OUTPATIENT)
Dept: UROLOGY | Facility: CLINIC | Age: 72
End: 2022-03-10

## 2022-04-19 ENCOUNTER — OFFICE VISIT (OUTPATIENT)
Dept: UROLOGY | Facility: CLINIC | Age: 72
End: 2022-04-19

## 2022-04-19 VITALS — WEIGHT: 290 LBS | BODY MASS INDEX: 38.43 KG/M2 | HEIGHT: 73 IN

## 2022-04-19 DIAGNOSIS — R97.20 ELEVATED PROSTATE SPECIFIC ANTIGEN (PSA): Primary | ICD-10-CM

## 2022-04-19 DIAGNOSIS — N20.0 URIC ACID KIDNEY STONE: ICD-10-CM

## 2022-04-19 DIAGNOSIS — N13.30 HYDRONEPHROSIS, UNSPECIFIED HYDRONEPHROSIS TYPE: ICD-10-CM

## 2022-04-19 DIAGNOSIS — N20.0 NEPHROLITHIASIS: ICD-10-CM

## 2022-04-19 PROBLEM — Z96.0 RETAINED URETERAL STENT: Status: RESOLVED | Noted: 2021-06-16 | Resolved: 2022-04-19

## 2022-04-19 PROCEDURE — 99214 OFFICE O/P EST MOD 30 MIN: CPT | Performed by: UROLOGY

## 2022-04-19 RX ORDER — CHLORPHENIRAMINE MALEATE 4 MG/1
TABLET ORAL
COMMUNITY
Start: 2022-03-11

## 2022-04-19 RX ORDER — TAMSULOSIN HYDROCHLORIDE 0.4 MG/1
1 CAPSULE ORAL DAILY
Qty: 90 CAPSULE | Refills: 3 | Status: SHIPPED | OUTPATIENT
Start: 2022-04-19

## 2022-04-19 RX ORDER — HYDROCODONE BITARTRATE AND ACETAMINOPHEN 7.5; 325 MG/1; MG/1
1 TABLET ORAL EVERY 6 HOURS PRN
Qty: 12 TABLET | Refills: 0 | Status: SHIPPED | OUTPATIENT
Start: 2022-04-19

## 2022-04-19 RX ORDER — AZELASTINE HYDROCHLORIDE 137 UG/1
SPRAY, METERED NASAL
COMMUNITY
Start: 2022-03-29

## 2022-04-19 NOTE — PROGRESS NOTES
"       UROLOGY OFFICE FOLLOW-UP NOTE    Subjective   HPI  Tate Kelley is a 71 y.o. male morbidly obese gentleman with numerous medical comorbidities including sleep apnea, COPD, dyslipidemia, hypertension, GERD, spinal stenosis, chronic constipation, report of CKD stage III, and left above-knee amputation for necrotizing fasciitis presents for further follow-up status post left ureteroscopy laser lithotripsy x2 for obstructive ureteral calculi, in July and August.  Patient subsequently underwent stent removal in OR and presents with renal ultrasound prior to today's appointment. Patient states that he has been doing well since stent removal.  Currently denies any urinary symptoms.  Denies hematuria or flank pain.  No complaints today.    Update 10/30/20: Presents for annual appt. States he is doing well from a stone standpoint; doesn't think he has any stones. Has not passed any since last visit. Notes a lingering intermittent right sided discomfort right under rib cage; uncertain why, doesn't think it is associated with stone pain. On occasion urine will be dark to light, associated with liquid intake. Unsure if he is emptying. Doesn't want Flomax, took before and it increased frequency of urination.   Saw nephrology; reports no change in meds or general mgt; will see annually.    Update 12/23/2020: Patient presents for follow-up after left ureteroscopy, laser lithotripsy and stent.  Patient remains with stent in place.  Intrarenal collecting system with challenging visibility due to large amount of intrarenal sediment and dust in all poles; fragments generally very fragile.    Sent home on potassium citrate BID and was instructed to have labs prior to today's visit; had performed at [facility], patient has those results. Never started potassium citrate.   Reports doing \"really good.\"  States Flomax helps his urination; request refill.  Stent remains in place.   Per patient, labs 12/9/20: creatinine 1.23; " potassium 4.1; unable to locate urine pH    Update 6/16/21: Presents for follow up after several missed appts; currently has left ureteral stent in place. Recently got back from vacation in [place]. No longer taking flomax.   Took potassium citrate for short time but reports some GI upset so stopped it. Did not have labs prior to today's visit.  Generally feels well.  Unsure when his next nephrology appointment is.    Update 4/19/2022: Presents for follow-up of nephrolithiasis, elevated psa. Status post left ureteroscopy, stone treatment, 6/28/2021.  Patient subsequently underwent stent removal in office and presents with renal ultrasound prior which was performed 7/26/2021 patient failed to follow-up after. Had left flank pain about 6 weeks after the ERLIN; thinks that he passed a stone. Reports currently doing very well.  Currently denies any urinary symptoms, frequently.  Denies hematuria or flank pain.  Still taking flomax.   Saw nephrology, Dr. Rosenberg, in February, now taking potassium citrate.   Was to bring PSA with him, but forgot it at home.  Has intentionally lost about 50 lbs.       _______________  ERLIN 7/26/2021: Moderate left-sided hydronephrosis. Possible filling defect within the left inferior pole calyx measuring 1.9 x 1.5 x 1.8 cm.  There   is no shadowing.  This could reflect a nonshadowing stone or prominent renal sinus fat.    Correlation with prior retrograde findings and/or CT urogram could be considered.  3. Exophytic simple cyst arising from the right kidney.      Stone composition:  6/28/2021: 90% ammonium acid urate; 10% uric acid  12/16/2020: Uric acid 60%; ammonia acid urate 40%  7/14/2019: 100% uric acid  2/1/2018: 88% uric acid, 10% calcium oxalate monohydrate    Urine pH:  8/8/2019: 6.5  7/14/2019: 7.0  5/30/2018: 7.5  1/27/2018: 5.0  1/26/2018: 6.0  3/18/2016: 7.5  3/7/2016: 5.5    Creatinine 8/12/2019: 1.13; BUN: 17             Results for orders placed or performed during the hospital  encounter of 01/07/22   COVID-19,APTIMA PANTHER(NORI), CL/[facility], NP/OP SWAB IN UTM/VTM/[facility]ALINE TRANSPORT MEDIA,24 HR TAT - Swab, Nasopharynx    Specimen: Nasopharynx; Swab   Result Value Ref Range    COVID19 Not Detected Not Detected - Ref. Range         Medical History:  Past Medical History:   Diagnosis Date   • Arthritis     NECK SPINE   • COPD (chronic obstructive pulmonary disease) ([facility])     NO MEDS/INHALERS, SOA E   • DDD (degenerative disc disease), cervical    • DM2 (diabetes mellitus, type 2) ([facility])     ORAL MEDS/CHECKS BS, AVE    • Flesh-eating bacteria (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)     MI 2/2016, CLEARED BY CARDIOLOGY, PCP ([facility]   • Hyperlipidemia    • Rosacea    • Seasonal allergies    • Sleep apnea    • Stroke (cerebrum) ([facility])     2016 AFTER SEPSIS, WEAK L ARM   • Tachycardia     NO MEDS CURRENTLY ASYMPTOMATIC, DENIES CP, SOAE   • Uric acid kidney stone 09/25/2019        Social History:  Social History     Socioeconomic History   • Marital status:    Tobacco Use   • Smoking status: Never Smoker   • Smokeless tobacco: Never Used   Vaping Use   • Vaping Use: Never used   Substance and Sexual Activity   • Alcohol use: Never   • Drug use: Never   • Sexual activity: Defer        Family History:  Family History   Problem Relation Age of Onset   • Hypertension Mother    • Leukemia Mother    • Heart attack Father    • Prostate cancer Father         60   • Malig Hyperthermia Neg Hx         Surgical History:  Past Surgical History:   Procedure Laterality Date   • ABDOMINAL SURGERY  2004    UMBILICAL HERNIA REPAIR    • AMPUTATION      LBN   • COLONOSCOPY     • CYSTOSCOPY URETEROSCOPY Left 6/28/2021    Procedure: CYSTOSCOPY URETEROSCOPY RETROGRADE PYELOGRAM STENT EXCHANGE LEFT;  Surgeon: Sarah Palacios MD;  Location: [facility] MAIN OR;  Service: Urology;  Laterality: Left;   • ENDOSCOPY     • FOOT SURGERY     • HERNIA  "REPAIR     • KIDNEY STONE SURGERY     • KNEE SURGERY     • MOUTH SURGERY          Allergies:  Allergies   Allergen Reactions   • Saccharin Nausea And Vomiting   • Latex Rash        Current Medications:  Current Outpatient Medications   Medication Sig Dispense Refill   • ALBUTEROL IN Inhale 1 puff As Needed.     • aspirin 325 MG tablet Take 325 mg by mouth Daily.     • Azelastine HCl 137 MCG/SPRAY solution      • chlorpheniramine (CHLOR-TRIMETON) 4 MG tablet      • glipizide (GLUCOTROL) 5 MG tablet glipizide 5 mg oral tablet take 1 tablet (5 mg) by oral route once daily before a meal   Active     • lisinopril (PRINIVIL,ZESTRIL) 10 MG tablet Take 1 tablet by mouth.     • metFORMIN ER (GLUCOPHAGE-XR) 500 MG 24 hr tablet      • potassium citrate (UROCIT-K) 10 MEQ (1080 MG) CR tablet      • SudoGest 30 MG tablet      • tamsulosin (Flomax) 0.4 MG capsule 24 hr capsule Take 1 capsule by mouth Daily. 90 capsule 3   • Urea 20 Intensive Hydrating 20 % cream      • diazePAM (VALIUM) 10 MG tablet      • Fish Oil-Cholecalciferol (FISH OIL + D3 PO) Take 3 tablets by mouth.     • HYDROcodone-acetaminophen (Norco) 7.5-325 MG per tablet Take 1 tablet by mouth Every 6 (Six) Hours As Needed for Severe Pain . 12 tablet 0     No current facility-administered medications for this visit.       Review of systems  Constitutional: Denies fever chills  GI: Denies nausea, vomiting    Objective     Vital Signs:   Ht 185.4 cm (73\")   Wt 132 kg (290 lb)   BMI 38.26 kg/m²       Physical exam  No acute distress, well-nourished morbidly obese  Awake alert and oriented  Mood normal; affect normal  Extremities: left aka    Result  PROCEDURE:  US RENAL BILATERAL     COMPARISON: [facility], [place], [place] RETROGRADE PYELOGRAM IN OR, 6/28/2021, 8:05.     INDICATIONS:  S/p Ureteral stone treatment     FINDINGS:          The right kidney measures 13.2 x 7.2 x 7.0 cm.  The left kidney measures 15.4 x 7.2 x 6.5 cm.    There is persistent moderate " left-sided hydronephrosis.  There is a possible filling defect within   the inferior pole calyx of the left kidney measuring 1.9 x 1.5 x 1.8 cm in size.  There is no   shadowing.     There are multiple simple appearing exophytic renal cysts arising from the right kidney.  The   largest measures 6.4 cm arising from the inferior aspect of the right kidney.     The urinary bladder is fluid filled without wall thickening.  The urinary bladder volume is 214 cc.         CONCLUSION:   1. Moderate left-sided hydronephrosis.  2. Possible filling defect within the left inferior pole calyx measuring 1.9 x 1.5 x 1.8 cm.  There   is no shadowing.  This could reflect a nonshadowing stone or prominent renal sinus fat.    Correlation with prior retrograde findings and/or CT urogram could be considered.  3. Exophytic simple cyst arising from the right kidney.                 AVE GARCIA MD         Electronically Signed and Approved By: AVE GARCIA MD on 7/26/2021 at 16:44                 Problem List:  Patient Active Problem List   Diagnosis   • DM2 (diabetes mellitus, type 2) ([facility])   • Flesh-eating bacteria (H[facility])   • Heart attack ([facility])   • GI bleed   • Stroke ([facility])   • Uric acid kidney stone   • Nephrolithiasis   • Hypertension   • Localized edema   • Persistent proteinuria   • Stage 3b chronic kidney disease ([facility])       Assessment/Plan   Diagnoses and all orders for this visit:    1. Elevated prostate specific antigen (PSA) (Primary)    2. Uric acid kidney stone  -     Cancel: CT Abdomen Pelvis Stone Protocol; Future  -     CT Abdomen Pelvis Stone Protocol; Future  -     tamsulosin (Flomax) 0.4 MG capsule 24 hr capsule; Take 1 capsule by mouth Daily.  Dispense: 90 capsule; Refill: 3  -     HYDROcodone-acetaminophen (Norco) 7.5-325 MG per tablet; Take 1 tablet by mouth Every 6 (Six) Hours As Needed for Severe Pain .  Dispense: 12 tablet; Refill: 0    3. Nephrolithiasis  -     Cancel: CT Abdomen Pelvis Stone Protocol;  Future  -     CT Abdomen Pelvis Stone Protocol; Future  -     HYDROcodone-acetaminophen (Norco) 7.5-325 MG per tablet; Take 1 tablet by mouth Every 6 (Six) Hours As Needed for Severe Pain .  Dispense: 12 tablet; Refill: 0    4. Hydronephrosis, unspecified hydronephrosis type  -     Cancel: CT Abdomen Pelvis Stone Protocol; Future  -     CT Abdomen Pelvis Stone Protocol; Future      Status post left ureteral stone treatment; subsequent ultrasound, 7/2021 indicates moderate left-sided hydronephrosis with possible filling defect inferior pole calyx.  Discussed the limitations of renal ultrasound.      Known intrarenal stones; patient intermittently passes stones.  Request on-demand pain medication should he experience ureteral colic; small amount of Norco provided; aware that no refills will be provided, would require face-to-face follow-up.  Side effects discussed including GI upset and dependency.  Continue Flomax    Recommend obtaining CT scan abdomen pelvis for further evaluation of his upper tracts.     Chemical stone analysis with ammonium acid urate and uric acid; developed stone disease likely exacerbated by medical comorbidities.  Patient with prior failed dissolution therapy; now taking potassium citrate.      Discussed can the importance of partnering with nephrology for medical optimization of metabolic stone disease, stone formation; follow up as scheduled    Notes understanding that without medical management for metabolic stone disease will likely have recurrence of obstructive uropathy.    Obtain CT abdomen pelvis follow-up after  Patient to bring most recent PSA lab with him at follow-up  All question addressed    Signed:  Sarah Palacios MD  04/19/22  17:14 EDT       MDM moderate 2 stable chronic illnesses; prescription drug management

## 2022-05-19 ENCOUNTER — APPOINTMENT (OUTPATIENT)
Dept: CT IMAGING | Facility: HOSPITAL | Age: 72
End: 2022-05-19

## 2022-06-16 ENCOUNTER — HOSPITAL ENCOUNTER (OUTPATIENT)
Dept: CT IMAGING | Facility: HOSPITAL | Age: 72
Discharge: HOME | End: 2022-06-16
Admitting: UROLOGY

## 2022-06-16 DIAGNOSIS — N20.0 URIC ACID KIDNEY STONE: ICD-10-CM

## 2022-06-16 DIAGNOSIS — N13.30 HYDRONEPHROSIS, UNSPECIFIED HYDRONEPHROSIS TYPE: ICD-10-CM

## 2022-06-16 DIAGNOSIS — N20.0 NEPHROLITHIASIS: ICD-10-CM

## 2022-06-16 PROCEDURE — 74176 CT ABD & PELVIS W/O CONTRAST: CPT

## 2022-06-30 ENCOUNTER — TELEPHONE (OUTPATIENT)
Dept: UROLOGY | Facility: CLINIC | Age: 72
End: 2022-06-30

## 2022-06-30 DIAGNOSIS — N13.30 HYDRONEPHROSIS, UNSPECIFIED HYDRONEPHROSIS TYPE: Primary | ICD-10-CM

## 2022-06-30 NOTE — TELEPHONE ENCOUNTER
----- Message from Veronica Hand sent at 6/29/2022  1:40 PM EDT -----  Regarding: needs a f/u appt  Please arrange follow-up at patient's convenience to discuss results of CT scan.  Thanks!!    ----- Message -----  From: Sarah Palacios MD  Sent: 6/29/2022  12:37 PM EDT  To: Veronica Hand    Please arrange follow-up at patient's convenience to discuss results of CT scan.  Thanks

## 2022-06-30 NOTE — TELEPHONE ENCOUNTER
PT WANTED TO KNOW IF DR. JOHNS COULD GIVE RESULTS OVER PHONE/OMID IS CHECKING WITH ANDREAS AND WILL GIVE PT A CALL BACK/MS

## 2022-07-18 ENCOUNTER — TELEPHONE (OUTPATIENT)
Dept: UROLOGY | Facility: CLINIC | Age: 72
End: 2022-07-18

## 2022-07-18 DIAGNOSIS — R30.0 DYSURIA: Primary | ICD-10-CM

## 2022-07-18 NOTE — TELEPHONE ENCOUNTER
Provider: DR JOHNS  Caller: AYDEN BURNHAM  Relationship to Patient: SELF  Pharmacy: [facility] [place] [facility] -  [place], [place] - [address] [phone] [facility] [phone]   Phone Number: [phone]  Reason for Call: PT BELIEVES HE HAS ANOTHER KIDNEY INFECTION  When was the patient last seen: 4-19-22  When did it start: 7-15-22    Characteristics of symptom/severity: PAIN IN LOWER BACK (WHERE HE HAS SOME STOPPAGE IN HIS URETHRA), URINE IS BROWN, PAIN WILL BE THERE FOR @5 MINUTES AND WILL GO AWAY FOR A WHILE, URINE IS DISCOLORED AND WILL CLEAR UP AND THEN IT TURN BROWN AGAIN.    DOES PT NEED TO BE SEEN? DOES HE NEED LABS OR A RX? PLEASE GIVE PT A CALL BACK  Timing- Is it constant or intermittent: INTERMITTENT

## 2022-07-18 NOTE — TELEPHONE ENCOUNTER
Spoke to patient and advised that I put in an order for a urine culture, and that Dr Palacios wouldn't send in antibiotics without first having a culture. Advised patient he could take pyridium for the discomfort. [facility]

## 2022-08-08 ENCOUNTER — HOSPITAL ENCOUNTER (OUTPATIENT)
Dept: NUCLEAR MEDICINE | Facility: HOSPITAL | Age: 72
Discharge: HOME | End: 2022-08-08
Admitting: UROLOGY

## 2022-08-08 VITALS — SYSTOLIC BLOOD PRESSURE: 154 MMHG | DIASTOLIC BLOOD PRESSURE: 80 MMHG

## 2022-08-08 DIAGNOSIS — N13.30 HYDRONEPHROSIS, UNSPECIFIED HYDRONEPHROSIS TYPE: ICD-10-CM

## 2022-08-08 PROCEDURE — 0 TECHNETIUM MERTIATIDE: Performed by: UROLOGY

## 2022-08-08 PROCEDURE — A9562 TC99M MERTIATIDE: HCPCS | Performed by: UROLOGY

## 2022-08-08 PROCEDURE — 25010000002 FUROSEMIDE PER 20 MG: Performed by: RADIOLOGY

## 2022-08-08 PROCEDURE — 78708 K FLOW/FUNCT IMAGE W/DRUG: CPT

## 2022-08-08 RX ORDER — FUROSEMIDE 10 MG/ML
40 INJECTION INTRAMUSCULAR; INTRAVENOUS ONCE
Status: COMPLETED | OUTPATIENT
Start: 2022-08-08 | End: 2022-08-08

## 2022-08-08 RX ADMIN — FUROSEMIDE 40 MG: 10 INJECTION, SOLUTION INTRAMUSCULAR; INTRAVENOUS at 14:12

## 2022-08-08 RX ADMIN — TECHNESCAN TC 99M MERTIATIDE 1 DOSE: 1 INJECTION, POWDER, LYOPHILIZED, FOR SOLUTION INTRAVENOUS at 14:02

## 2022-08-10 ENCOUNTER — TRANSCRIBE ORDERS (OUTPATIENT)
Dept: ADMINISTRATIVE | Facility: HOSPITAL | Age: 72
End: 2022-08-10

## 2022-08-10 ENCOUNTER — LAB (OUTPATIENT)
Dept: LAB | Facility: HOSPITAL | Age: 72
End: 2022-08-10

## 2022-08-10 DIAGNOSIS — R30.0 DYSURIA: ICD-10-CM

## 2022-08-10 DIAGNOSIS — N18.32 STAGE 3B CHRONIC KIDNEY DISEASE: Primary | ICD-10-CM

## 2022-08-10 DIAGNOSIS — N18.32 STAGE 3B CHRONIC KIDNEY DISEASE: ICD-10-CM

## 2022-08-10 LAB
25(OH)D3 SERPL-MCNC: 16.1 NG/ML (ref 30–100)
ALBUMIN SERPL-MCNC: 4.3 G/DL (ref 3.5–5.2)
ANION GAP SERPL CALCULATED.3IONS-SCNC: 12.2 MMOL/L (ref 5–15)
BACTERIA UR QL AUTO: ABNORMAL /HPF
BASOPHILS # BLD MANUAL: 0.1 10*3/MM3 (ref 0–0.2)
BASOPHILS NFR BLD MANUAL: 1 % (ref 0–1.5)
BILIRUB UR QL STRIP: NEGATIVE
BUN SERPL-MCNC: 23 MG/DL (ref 8–23)
BUN/CREAT SERPL: 16.4 (ref 7–25)
CALCIUM SPEC-SCNC: 9.8 MG/DL (ref 8.6–10.5)
CHLORIDE SERPL-SCNC: 105 MMOL/L (ref 98–107)
CLARITY UR: ABNORMAL
CO2 SERPL-SCNC: 24.8 MMOL/L (ref 22–29)
COLOR UR: YELLOW
CREAT SERPL-MCNC: 1.4 MG/DL (ref 0.76–1.27)
CREAT UR-MCNC: 182.9 MG/DL
DEPRECATED RDW RBC AUTO: 41.3 FL (ref 37–54)
EGFRCR SERPLBLD CKD-EPI 2021: 53.7 ML/MIN/1.73
EOSINOPHIL # BLD MANUAL: 0.5 10*3/MM3 (ref 0–0.4)
EOSINOPHIL NFR BLD MANUAL: 5 % (ref 0.3–6.2)
ERYTHROCYTE [DISTWIDTH] IN BLOOD BY AUTOMATED COUNT: 13 % (ref 12.3–15.4)
GLUCOSE SERPL-MCNC: 137 MG/DL (ref 65–99)
GLUCOSE UR STRIP-MCNC: NEGATIVE MG/DL
HCT VFR BLD AUTO: 47.4 % (ref 37.5–51)
HGB BLD-MCNC: 15.8 G/DL (ref 13–17.7)
HGB UR QL STRIP.AUTO: ABNORMAL
HYALINE CASTS UR QL AUTO: ABNORMAL /LPF
KETONES UR QL STRIP: NEGATIVE
LEUKOCYTE ESTERASE UR QL STRIP.AUTO: ABNORMAL
LYMPHOCYTES # BLD MANUAL: 4.46 10*3/MM3 (ref 0.7–3.1)
LYMPHOCYTES NFR BLD MANUAL: 5 % (ref 5–12)
MCH RBC QN AUTO: 29.2 PG (ref 26.6–33)
MCHC RBC AUTO-ENTMCNC: 33.3 G/DL (ref 31.5–35.7)
MCV RBC AUTO: 87.6 FL (ref 79–97)
MONOCYTES # BLD: 0.5 10*3/MM3 (ref 0.1–0.9)
NEUTROPHILS # BLD AUTO: 4.36 10*3/MM3 (ref 1.7–7)
NEUTROPHILS NFR BLD MANUAL: 44 % (ref 42.7–76)
NITRITE UR QL STRIP: POSITIVE
PH UR STRIP.AUTO: 6 [PH] (ref 5–8)
PHOSPHATE SERPL-MCNC: 2.4 MG/DL (ref 2.5–4.5)
PLAT MORPH BLD: NORMAL
PLATELET # BLD AUTO: 208 10*3/MM3 (ref 140–450)
PMV BLD AUTO: 10.2 FL (ref 6–12)
POTASSIUM SERPL-SCNC: 4.3 MMOL/L (ref 3.5–5.2)
PROT ?TM UR-MCNC: 23.8 MG/DL
PROT UR QL STRIP: ABNORMAL
PROT/CREAT UR: 0.13 MG/G{CREAT}
PTH-INTACT SERPL-MCNC: 81.4 PG/ML (ref 15–65)
RBC # BLD AUTO: 5.41 10*6/MM3 (ref 4.14–5.8)
RBC # UR STRIP: ABNORMAL /HPF
RBC MORPH BLD: NORMAL
REF LAB TEST METHOD: ABNORMAL
SODIUM SERPL-SCNC: 142 MMOL/L (ref 136–145)
SP GR UR STRIP: 1.02 (ref 1–1.03)
SQUAMOUS #/AREA URNS HPF: ABNORMAL /HPF
UROBILINOGEN UR QL STRIP: ABNORMAL
VARIANT LYMPHS NFR BLD MANUAL: 45 % (ref 19.6–45.3)
WBC # UR STRIP: ABNORMAL /HPF
WBC MORPH BLD: NORMAL
WBC NRBC COR # BLD: 9.91 10*3/MM3 (ref 3.4–10.8)

## 2022-08-10 PROCEDURE — 80069 RENAL FUNCTION PANEL: CPT

## 2022-08-10 PROCEDURE — 85025 COMPLETE CBC W/AUTO DIFF WBC: CPT

## 2022-08-10 PROCEDURE — 82570 ASSAY OF URINE CREATININE: CPT

## 2022-08-10 PROCEDURE — 81001 URINALYSIS AUTO W/SCOPE: CPT

## 2022-08-10 PROCEDURE — 85007 BL SMEAR W/DIFF WBC COUNT: CPT

## 2022-08-10 PROCEDURE — 87077 CULTURE AEROBIC IDENTIFY: CPT

## 2022-08-10 PROCEDURE — 82306 VITAMIN D 25 HYDROXY: CPT

## 2022-08-10 PROCEDURE — 87147 CULTURE TYPE IMMUNOLOGIC: CPT

## 2022-08-10 PROCEDURE — 83970 ASSAY OF PARATHORMONE: CPT

## 2022-08-10 PROCEDURE — 84156 ASSAY OF PROTEIN URINE: CPT

## 2022-08-10 PROCEDURE — 87086 URINE CULTURE/COLONY COUNT: CPT

## 2022-08-10 PROCEDURE — 36415 COLL VENOUS BLD VENIPUNCTURE: CPT

## 2022-08-10 PROCEDURE — 87186 SC STD MICRODIL/AGAR DIL: CPT

## 2022-08-12 ENCOUNTER — TELEPHONE (OUTPATIENT)
Dept: UROLOGY | Facility: CLINIC | Age: 72
End: 2022-08-12

## 2022-08-12 DIAGNOSIS — N39.0 URINARY TRACT INFECTION WITHOUT HEMATURIA, SITE UNSPECIFIED: Primary | ICD-10-CM

## 2022-08-12 RX ORDER — CEFDINIR 300 MG/1
300 CAPSULE ORAL 2 TIMES DAILY
Qty: 14 CAPSULE | Refills: 0 | Status: SHIPPED | OUTPATIENT
Start: 2022-08-12

## 2022-08-12 NOTE — TELEPHONE ENCOUNTER
Left detailed message on machine relaying Dr Tierney's message. Ask patient to call back to schedule as she recommended.

## 2022-08-12 NOTE — TELEPHONE ENCOUNTER
----- Message from Sarah Palacios MD sent at 8/12/2022 12:51 PM EDT -----  Please let patient know that his urine culture did grow bacteria.  I sent in cefdinir to his pharmacy at [place].    I also reviewed his nuclear medicine study, there is delayed emptying on his left side.  He does need a follow-up appointment in office at his convenience (this requires a management discussion which I am unwilling to perform on the phone).    Please arrange follow-up when he is willing to come in.  Thank so much

## 2022-08-14 LAB — BACTERIA SPEC AEROBE CULT: ABNORMAL

## 2023-02-07 ENCOUNTER — TRANSCRIBE ORDERS (OUTPATIENT)
Dept: LAB | Facility: HOSPITAL | Age: 73
End: 2023-02-07
Payer: MEDICARE

## 2023-02-07 DIAGNOSIS — N18.32 CHRONIC KIDNEY DISEASE (CKD) STAGE G3B/A1, MODERATELY DECREASED GLOMERULAR FILTRATION RATE (GFR) BETWEEN 30-44 ML/MIN/1.73 SQUARE METER AND ALBUMINURIA CREATININE RATIO LESS THAN 30 MG/G (CMS/H*: Primary | ICD-10-CM

## 2023-02-07 DIAGNOSIS — E55.9 VITAMIN D DEFICIENCY: ICD-10-CM

## 2023-02-08 ENCOUNTER — LAB (OUTPATIENT)
Dept: LAB | Facility: HOSPITAL | Age: 73
End: 2023-02-08
Payer: MEDICARE

## 2023-02-08 DIAGNOSIS — E55.9 VITAMIN D DEFICIENCY: ICD-10-CM

## 2023-02-08 DIAGNOSIS — N18.32 CHRONIC KIDNEY DISEASE (CKD) STAGE G3B/A1, MODERATELY DECREASED GLOMERULAR FILTRATION RATE (GFR) BETWEEN 30-44 ML/MIN/1.73 SQUARE METER AND ALBUMINURIA CREATININE RATIO LESS THAN 30 MG/G (CMS/H*: ICD-10-CM

## 2023-02-08 LAB
ALBUMIN SERPL-MCNC: 4.3 G/DL (ref 3.5–5.2)
ANION GAP SERPL CALCULATED.3IONS-SCNC: 8.6 MMOL/L (ref 5–15)
BACTERIA UR QL AUTO: ABNORMAL /HPF
BILIRUB UR QL STRIP: NEGATIVE
BUN SERPL-MCNC: 26 MG/DL (ref 8–23)
BUN/CREAT SERPL: 22.4 (ref 7–25)
CALCIUM SPEC-SCNC: 9.7 MG/DL (ref 8.6–10.5)
CHLORIDE SERPL-SCNC: 105 MMOL/L (ref 98–107)
CLARITY UR: ABNORMAL
CO2 SERPL-SCNC: 24.4 MMOL/L (ref 22–29)
COLOR UR: YELLOW
CREAT SERPL-MCNC: 1.16 MG/DL (ref 0.76–1.27)
CREAT UR-MCNC: 123.2 MG/DL
EGFRCR SERPLBLD CKD-EPI 2021: 67.3 ML/MIN/1.73
GLUCOSE SERPL-MCNC: 117 MG/DL (ref 65–99)
GLUCOSE UR STRIP-MCNC: NEGATIVE MG/DL
HGB UR QL STRIP.AUTO: ABNORMAL
HYALINE CASTS UR QL AUTO: ABNORMAL /LPF
KETONES UR QL STRIP: NEGATIVE
LEUKOCYTE ESTERASE UR QL STRIP.AUTO: ABNORMAL
NITRITE UR QL STRIP: POSITIVE
PH UR STRIP.AUTO: 6 [PH] (ref 5–8)
PHOSPHATE SERPL-MCNC: 2.6 MG/DL (ref 2.5–4.5)
POTASSIUM SERPL-SCNC: 4.3 MMOL/L (ref 3.5–5.2)
PROT ?TM UR-MCNC: 21.7 MG/DL
PROT UR QL STRIP: ABNORMAL
PROT/CREAT UR: 0.18 MG/G{CREAT}
RBC # UR STRIP: ABNORMAL /HPF
REF LAB TEST METHOD: ABNORMAL
SODIUM SERPL-SCNC: 138 MMOL/L (ref 136–145)
SP GR UR STRIP: 1.02 (ref 1–1.03)
SQUAMOUS #/AREA URNS HPF: ABNORMAL /HPF
UROBILINOGEN UR QL STRIP: ABNORMAL
WBC # UR STRIP: ABNORMAL /HPF

## 2023-02-08 PROCEDURE — 82306 VITAMIN D 25 HYDROXY: CPT

## 2023-02-08 PROCEDURE — 84156 ASSAY OF PROTEIN URINE: CPT

## 2023-02-08 PROCEDURE — 36415 COLL VENOUS BLD VENIPUNCTURE: CPT

## 2023-02-08 PROCEDURE — 85025 COMPLETE CBC W/AUTO DIFF WBC: CPT

## 2023-02-08 PROCEDURE — 81001 URINALYSIS AUTO W/SCOPE: CPT

## 2023-02-08 PROCEDURE — 85007 BL SMEAR W/DIFF WBC COUNT: CPT

## 2023-02-08 PROCEDURE — 80069 RENAL FUNCTION PANEL: CPT

## 2023-02-08 PROCEDURE — 82570 ASSAY OF URINE CREATININE: CPT

## 2023-02-08 PROCEDURE — 83970 ASSAY OF PARATHORMONE: CPT

## 2023-02-09 LAB
25(OH)D3 SERPL-MCNC: 29.4 NG/ML (ref 30–100)
BASOPHILS # BLD MANUAL: 0.1 10*3/MM3 (ref 0–0.2)
BASOPHILS NFR BLD MANUAL: 1 % (ref 0–1.5)
DEPRECATED RDW RBC AUTO: 41.3 FL (ref 37–54)
EOSINOPHIL # BLD MANUAL: 0.19 10*3/MM3 (ref 0–0.4)
EOSINOPHIL NFR BLD MANUAL: 2 % (ref 0.3–6.2)
ERYTHROCYTE [DISTWIDTH] IN BLOOD BY AUTOMATED COUNT: 13.2 % (ref 12.3–15.4)
HCT VFR BLD AUTO: 47.2 % (ref 37.5–51)
HGB BLD-MCNC: 15.8 G/DL (ref 13–17.7)
LYMPHOCYTES # BLD MANUAL: 5.63 10*3/MM3 (ref 0.7–3.1)
LYMPHOCYTES NFR BLD MANUAL: 2 % (ref 5–12)
MCH RBC QN AUTO: 29.2 PG (ref 26.6–33)
MCHC RBC AUTO-ENTMCNC: 33.5 G/DL (ref 31.5–35.7)
MCV RBC AUTO: 87.1 FL (ref 79–97)
MONOCYTES # BLD: 0.19 10*3/MM3 (ref 0.1–0.9)
NEUTROPHILS # BLD AUTO: 3.55 10*3/MM3 (ref 1.7–7)
NEUTROPHILS NFR BLD MANUAL: 36.7 % (ref 42.7–76)
PLAT MORPH BLD: NORMAL
PLATELET # BLD AUTO: 216 10*3/MM3 (ref 140–450)
PMV BLD AUTO: 10 FL (ref 6–12)
PTH-INTACT SERPL-MCNC: 84.7 PG/ML (ref 15–65)
RBC # BLD AUTO: 5.42 10*6/MM3 (ref 4.14–5.8)
RBC MORPH BLD: NORMAL
VARIANT LYMPHS NFR BLD MANUAL: 58.2 % (ref 19.6–45.3)
WBC MORPH BLD: NORMAL
WBC NRBC COR # BLD: 9.67 10*3/MM3 (ref 3.4–10.8)

## 2023-03-27 ENCOUNTER — TRANSCRIBE ORDERS (OUTPATIENT)
Dept: LAB | Facility: HOSPITAL | Age: 73
End: 2023-03-27
Payer: MEDICARE

## 2023-03-27 ENCOUNTER — LAB (OUTPATIENT)
Dept: LAB | Facility: HOSPITAL | Age: 73
End: 2023-03-27
Payer: MEDICARE

## 2023-03-27 DIAGNOSIS — N18.32 STAGE 3B CHRONIC KIDNEY DISEASE: ICD-10-CM

## 2023-03-27 DIAGNOSIS — N18.32 STAGE 3B CHRONIC KIDNEY DISEASE: Primary | ICD-10-CM

## 2023-03-27 LAB
ANION GAP SERPL CALCULATED.3IONS-SCNC: 7 MMOL/L (ref 5–15)
BUN SERPL-MCNC: 21 MG/DL (ref 8–23)
BUN/CREAT SERPL: 15.6 (ref 7–25)
CALCIUM SPEC-SCNC: 9.6 MG/DL (ref 8.6–10.5)
CHLORIDE SERPL-SCNC: 105 MMOL/L (ref 98–107)
CO2 SERPL-SCNC: 27 MMOL/L (ref 22–29)
CREAT SERPL-MCNC: 1.35 MG/DL (ref 0.76–1.27)
EGFRCR SERPLBLD CKD-EPI 2021: 56.1 ML/MIN/1.73
GLUCOSE SERPL-MCNC: 164 MG/DL (ref 65–99)
POTASSIUM SERPL-SCNC: 4.7 MMOL/L (ref 3.5–5.2)
SODIUM SERPL-SCNC: 139 MMOL/L (ref 136–145)

## 2023-03-27 PROCEDURE — 80048 BASIC METABOLIC PNL TOTAL CA: CPT

## 2023-03-27 PROCEDURE — 36415 COLL VENOUS BLD VENIPUNCTURE: CPT

## 2023-09-22 ENCOUNTER — ANESTHESIA (OUTPATIENT)
Dept: PERIOP | Facility: HOSPITAL | Age: 73
DRG: 660 | End: 2023-09-22
Payer: OTHER GOVERNMENT

## 2023-09-22 ENCOUNTER — HOSPITAL ENCOUNTER (INPATIENT)
Facility: HOSPITAL | Age: 73
LOS: 1 days | Discharge: HOME | DRG: 660 | End: 2023-09-24
Attending: EMERGENCY MEDICINE | Admitting: HOSPITALIST
Payer: OTHER GOVERNMENT

## 2023-09-22 ENCOUNTER — APPOINTMENT (OUTPATIENT)
Dept: CT IMAGING | Facility: HOSPITAL | Age: 73
DRG: 660 | End: 2023-09-22
Payer: OTHER GOVERNMENT

## 2023-09-22 ENCOUNTER — ANESTHESIA EVENT (OUTPATIENT)
Dept: PERIOP | Facility: HOSPITAL | Age: 73
DRG: 660 | End: 2023-09-22
Payer: OTHER GOVERNMENT

## 2023-09-22 ENCOUNTER — APPOINTMENT (OUTPATIENT)
Dept: GENERAL RADIOLOGY | Facility: HOSPITAL | Age: 73
DRG: 660 | End: 2023-09-22
Payer: OTHER GOVERNMENT

## 2023-09-22 DIAGNOSIS — N39.0 URINARY TRACT INFECTION WITHOUT HEMATURIA, SITE UNSPECIFIED: ICD-10-CM

## 2023-09-22 DIAGNOSIS — N20.1 URETEROLITHIASIS: ICD-10-CM

## 2023-09-22 DIAGNOSIS — A41.9 SEPSIS, DUE TO UNSPECIFIED ORGANISM, UNSPECIFIED WHETHER ACUTE ORGAN DYSFUNCTION PRESENT: Primary | ICD-10-CM

## 2023-09-22 LAB
ALBUMIN SERPL-MCNC: 4 G/DL (ref 3.5–5.2)
ALBUMIN/GLOB SERPL: 1.3 G/DL
ALP SERPL-CCNC: 92 U/L (ref 39–117)
ALT SERPL W P-5'-P-CCNC: 17 U/L (ref 1–41)
ANION GAP SERPL CALCULATED.3IONS-SCNC: 10.9 MMOL/L (ref 5–15)
AST SERPL-CCNC: 12 U/L (ref 1–40)
BACTERIA UR QL AUTO: ABNORMAL /HPF
BASOPHILS # BLD AUTO: 0.05 10*3/MM3 (ref 0–0.2)
BASOPHILS NFR BLD AUTO: 0.4 % (ref 0–1.5)
BILIRUB SERPL-MCNC: 0.6 MG/DL (ref 0–1.2)
BILIRUB UR QL STRIP: ABNORMAL
BUN SERPL-MCNC: 23 MG/DL (ref 8–23)
BUN/CREAT SERPL: 13.8 (ref 7–25)
CALCIUM SPEC-SCNC: 9.6 MG/DL (ref 8.6–10.5)
CHLORIDE SERPL-SCNC: 98 MMOL/L (ref 98–107)
CLARITY UR: ABNORMAL
CO2 SERPL-SCNC: 26.1 MMOL/L (ref 22–29)
COLOR UR: YELLOW
CREAT SERPL-MCNC: 1.67 MG/DL (ref 0.76–1.27)
D-LACTATE SERPL-SCNC: 1.4 MMOL/L (ref 0.5–2)
DEPRECATED RDW RBC AUTO: 41.5 FL (ref 37–54)
EGFRCR SERPLBLD CKD-EPI 2021: 43.2 ML/MIN/1.73
EOSINOPHIL # BLD AUTO: 0.08 10*3/MM3 (ref 0–0.4)
EOSINOPHIL NFR BLD AUTO: 0.7 % (ref 0.3–6.2)
ERYTHROCYTE [DISTWIDTH] IN BLOOD BY AUTOMATED COUNT: 12.3 % (ref 12.3–15.4)
GLOBULIN UR ELPH-MCNC: 3 GM/DL
GLUCOSE BLDC GLUCOMTR-MCNC: 174 MG/DL (ref 70–99)
GLUCOSE BLDC GLUCOMTR-MCNC: 183 MG/DL (ref 70–99)
GLUCOSE SERPL-MCNC: 192 MG/DL (ref 65–99)
GLUCOSE UR STRIP-MCNC: ABNORMAL MG/DL
HCT VFR BLD AUTO: 43 % (ref 37.5–51)
HGB BLD-MCNC: 13.8 G/DL (ref 13–17.7)
HGB UR QL STRIP.AUTO: ABNORMAL
HOLD SPECIMEN: NORMAL
HOLD SPECIMEN: NORMAL
HYALINE CASTS UR QL AUTO: ABNORMAL /LPF
IMM GRANULOCYTES # BLD AUTO: 0.05 10*3/MM3 (ref 0–0.05)
IMM GRANULOCYTES NFR BLD AUTO: 0.4 % (ref 0–0.5)
KETONES UR QL STRIP: ABNORMAL
LEUKOCYTE ESTERASE UR QL STRIP.AUTO: ABNORMAL
LIPASE SERPL-CCNC: 40 U/L (ref 13–60)
LYMPHOCYTES # BLD AUTO: 3.44 10*3/MM3 (ref 0.7–3.1)
LYMPHOCYTES NFR BLD AUTO: 28.6 % (ref 19.6–45.3)
MAGNESIUM SERPL-MCNC: 1.9 MG/DL (ref 1.6–2.4)
MCH RBC QN AUTO: 29.1 PG (ref 26.6–33)
MCHC RBC AUTO-ENTMCNC: 32.1 G/DL (ref 31.5–35.7)
MCV RBC AUTO: 90.5 FL (ref 79–97)
MONOCYTES # BLD AUTO: 1.01 10*3/MM3 (ref 0.1–0.9)
MONOCYTES NFR BLD AUTO: 8.4 % (ref 5–12)
NEUTROPHILS NFR BLD AUTO: 61.5 % (ref 42.7–76)
NEUTROPHILS NFR BLD AUTO: 7.41 10*3/MM3 (ref 1.7–7)
NITRITE UR QL STRIP: ABNORMAL
NRBC BLD AUTO-RTO: 0 /100 WBC (ref 0–0.2)
PH UR STRIP.AUTO: ABNORMAL [PH]
PHOSPHATE SERPL-MCNC: 2.6 MG/DL (ref 2.5–4.5)
PLATELET # BLD AUTO: 255 10*3/MM3 (ref 140–450)
PMV BLD AUTO: 9.7 FL (ref 6–12)
POTASSIUM SERPL-SCNC: 4.4 MMOL/L (ref 3.5–5.2)
PROT SERPL-MCNC: 7 G/DL (ref 6–8.5)
PROT UR QL STRIP: ABNORMAL
RBC # BLD AUTO: 4.75 10*6/MM3 (ref 4.14–5.8)
RBC # UR STRIP: ABNORMAL /HPF
REF LAB TEST METHOD: ABNORMAL
SODIUM SERPL-SCNC: 135 MMOL/L (ref 136–145)
SP GR UR STRIP: ABNORMAL
SQUAMOUS #/AREA URNS HPF: ABNORMAL /HPF
UROBILINOGEN UR QL STRIP: ABNORMAL
WBC # UR STRIP: ABNORMAL /HPF
WBC NRBC COR # BLD: 12.04 10*3/MM3 (ref 3.4–10.8)
WHOLE BLOOD HOLD COAG: NORMAL
WHOLE BLOOD HOLD SPECIMEN: NORMAL

## 2023-09-22 PROCEDURE — 25010000002 ONDANSETRON PER 1 MG: Performed by: NURSE ANESTHETIST, CERTIFIED REGISTERED

## 2023-09-22 PROCEDURE — 36415 COLL VENOUS BLD VENIPUNCTURE: CPT

## 2023-09-22 PROCEDURE — G0378 HOSPITAL OBSERVATION PER HR: HCPCS

## 2023-09-22 PROCEDURE — 25010000002 ONDANSETRON PER 1 MG: Performed by: EMERGENCY MEDICINE

## 2023-09-22 PROCEDURE — 0T778DZ DILATION OF LEFT URETER WITH INTRALUMINAL DEVICE, VIA NATURAL OR ARTIFICIAL OPENING ENDOSCOPIC: ICD-10-PCS | Performed by: UROLOGY

## 2023-09-22 PROCEDURE — 85025 COMPLETE CBC W/AUTO DIFF WBC: CPT

## 2023-09-22 PROCEDURE — 87186 SC STD MICRODIL/AGAR DIL: CPT | Performed by: EMERGENCY MEDICINE

## 2023-09-22 PROCEDURE — 87040 BLOOD CULTURE FOR BACTERIA: CPT | Performed by: HOSPITALIST

## 2023-09-22 PROCEDURE — 82948 REAGENT STRIP/BLOOD GLUCOSE: CPT

## 2023-09-22 PROCEDURE — 25010000002 CEFTRIAXONE PER 250 MG: Performed by: HOSPITALIST

## 2023-09-22 PROCEDURE — 76000 FLUOROSCOPY <1 HR PHYS/QHP: CPT

## 2023-09-22 PROCEDURE — 25010000002 MIDAZOLAM PER 1MG: Performed by: ANESTHESIOLOGY

## 2023-09-22 PROCEDURE — 80053 COMPREHEN METABOLIC PANEL: CPT

## 2023-09-22 PROCEDURE — C2617 STENT, NON-COR, TEM W/O DEL: HCPCS | Performed by: UROLOGY

## 2023-09-22 PROCEDURE — C1769 GUIDE WIRE: HCPCS | Performed by: UROLOGY

## 2023-09-22 PROCEDURE — 99285 EMERGENCY DEPT VISIT HI MDM: CPT

## 2023-09-22 PROCEDURE — 83735 ASSAY OF MAGNESIUM: CPT | Performed by: HOSPITALIST

## 2023-09-22 PROCEDURE — 99214 OFFICE O/P EST MOD 30 MIN: CPT | Performed by: UROLOGY

## 2023-09-22 PROCEDURE — 25010000002 PROPOFOL 10 MG/ML EMULSION: Performed by: NURSE ANESTHETIST, CERTIFIED REGISTERED

## 2023-09-22 PROCEDURE — 87086 URINE CULTURE/COLONY COUNT: CPT | Performed by: EMERGENCY MEDICINE

## 2023-09-22 PROCEDURE — 83605 ASSAY OF LACTIC ACID: CPT

## 2023-09-22 PROCEDURE — 84100 ASSAY OF PHOSPHORUS: CPT | Performed by: HOSPITALIST

## 2023-09-22 PROCEDURE — 74176 CT ABD & PELVIS W/O CONTRAST: CPT

## 2023-09-22 PROCEDURE — C1758 CATHETER, URETERAL: HCPCS | Performed by: UROLOGY

## 2023-09-22 PROCEDURE — 25010000002 DEXAMETHASONE PER 1 MG: Performed by: NURSE ANESTHETIST, CERTIFIED REGISTERED

## 2023-09-22 PROCEDURE — 83690 ASSAY OF LIPASE: CPT

## 2023-09-22 PROCEDURE — 81001 URINALYSIS AUTO W/SCOPE: CPT

## 2023-09-22 PROCEDURE — 25010000002 CEFTRIAXONE PER 250 MG: Performed by: EMERGENCY MEDICINE

## 2023-09-22 PROCEDURE — 25010000002 ESMOLOL 100 MG/10ML SOLUTION: Performed by: NURSE ANESTHETIST, CERTIFIED REGISTERED

## 2023-09-22 PROCEDURE — 25010000002 MORPHINE PER 10 MG: Performed by: EMERGENCY MEDICINE

## 2023-09-22 PROCEDURE — 63710000001 INSULIN LISPRO (HUMAN) PER 5 UNITS: Performed by: HOSPITALIST

## 2023-09-22 PROCEDURE — 87077 CULTURE AEROBIC IDENTIFY: CPT | Performed by: EMERGENCY MEDICINE

## 2023-09-22 PROCEDURE — 52332 CYSTOSCOPY AND TREATMENT: CPT | Performed by: UROLOGY

## 2023-09-22 DEVICE — URETERAL STENT
Type: IMPLANTABLE DEVICE | Site: URETER | Status: FUNCTIONAL
Brand: ASCERTA™

## 2023-09-22 RX ORDER — MORPHINE SULFATE 2 MG/ML
1 INJECTION, SOLUTION INTRAMUSCULAR; INTRAVENOUS EVERY 4 HOURS PRN
Status: DISCONTINUED | OUTPATIENT
Start: 2023-09-22 | End: 2023-09-24 | Stop reason: HOSPADM

## 2023-09-22 RX ORDER — NALOXONE HCL 0.4 MG/ML
0.4 VIAL (ML) INJECTION
Status: DISCONTINUED | OUTPATIENT
Start: 2023-09-22 | End: 2023-09-24 | Stop reason: HOSPADM

## 2023-09-22 RX ORDER — ACETAMINOPHEN 325 MG/1
650 TABLET ORAL EVERY 4 HOURS PRN
Status: DISCONTINUED | OUTPATIENT
Start: 2023-09-22 | End: 2023-09-24 | Stop reason: HOSPADM

## 2023-09-22 RX ORDER — INSULIN LISPRO 100 [IU]/ML
2-9 INJECTION, SOLUTION INTRAVENOUS; SUBCUTANEOUS
Status: DISCONTINUED | OUTPATIENT
Start: 2023-09-22 | End: 2023-09-24 | Stop reason: HOSPADM

## 2023-09-22 RX ORDER — BISACODYL 10 MG
10 SUPPOSITORY, RECTAL RECTAL DAILY PRN
Status: DISCONTINUED | OUTPATIENT
Start: 2023-09-22 | End: 2023-09-24 | Stop reason: HOSPADM

## 2023-09-22 RX ORDER — TAMSULOSIN HYDROCHLORIDE 0.4 MG/1
0.4 CAPSULE ORAL DAILY
Status: DISCONTINUED | OUTPATIENT
Start: 2023-09-22 | End: 2023-09-24 | Stop reason: HOSPADM

## 2023-09-22 RX ORDER — SODIUM CHLORIDE 0.9 % (FLUSH) 0.9 %
10 SYRINGE (ML) INJECTION AS NEEDED
Status: DISCONTINUED | OUTPATIENT
Start: 2023-09-22 | End: 2023-09-24 | Stop reason: HOSPADM

## 2023-09-22 RX ORDER — ESMOLOL HYDROCHLORIDE 10 MG/ML
INJECTION INTRAVENOUS AS NEEDED
Status: DISCONTINUED | OUTPATIENT
Start: 2023-09-22 | End: 2023-09-22 | Stop reason: SURG

## 2023-09-22 RX ORDER — ONDANSETRON 2 MG/ML
4 INJECTION INTRAMUSCULAR; INTRAVENOUS ONCE AS NEEDED
Status: DISCONTINUED | OUTPATIENT
Start: 2023-09-22 | End: 2023-09-22 | Stop reason: HOSPADM

## 2023-09-22 RX ORDER — ERGOCALCIFEROL 1.25 MG/1
50000 CAPSULE ORAL WEEKLY
COMMUNITY

## 2023-09-22 RX ORDER — OXYCODONE HYDROCHLORIDE 5 MG/1
5 TABLET ORAL
Status: DISCONTINUED | OUTPATIENT
Start: 2023-09-22 | End: 2023-09-22 | Stop reason: HOSPADM

## 2023-09-22 RX ORDER — LIDOCAINE HYDROCHLORIDE 20 MG/ML
INJECTION, SOLUTION EPIDURAL; INFILTRATION; INTRACAUDAL; PERINEURAL AS NEEDED
Status: DISCONTINUED | OUTPATIENT
Start: 2023-09-22 | End: 2023-09-22 | Stop reason: SURG

## 2023-09-22 RX ORDER — MAGNESIUM HYDROXIDE 1200 MG/15ML
LIQUID ORAL AS NEEDED
Status: DISCONTINUED | OUTPATIENT
Start: 2023-09-22 | End: 2023-09-22 | Stop reason: HOSPADM

## 2023-09-22 RX ORDER — CEFTRIAXONE SODIUM 1 G/50ML
1000 INJECTION, SOLUTION INTRAVENOUS ONCE
Status: COMPLETED | OUTPATIENT
Start: 2023-09-22 | End: 2023-09-22

## 2023-09-22 RX ORDER — ACETAMINOPHEN 325 MG/1
650 TABLET ORAL EVERY 6 HOURS
Status: CANCELLED | OUTPATIENT
Start: 2023-09-22 | End: 2023-09-24

## 2023-09-22 RX ORDER — ACETAMINOPHEN 160 MG/5ML
650 SOLUTION ORAL EVERY 4 HOURS PRN
Status: DISCONTINUED | OUTPATIENT
Start: 2023-09-22 | End: 2023-09-24 | Stop reason: HOSPADM

## 2023-09-22 RX ORDER — ASPIRIN 81 MG/1
81 TABLET ORAL EVERY EVENING
COMMUNITY

## 2023-09-22 RX ORDER — ACETAMINOPHEN 500 MG
1000 TABLET ORAL ONCE
Status: DISCONTINUED | OUTPATIENT
Start: 2023-09-22 | End: 2023-09-22 | Stop reason: HOSPADM

## 2023-09-22 RX ORDER — SODIUM CHLORIDE 9 MG/ML
40 INJECTION, SOLUTION INTRAVENOUS AS NEEDED
Status: DISCONTINUED | OUTPATIENT
Start: 2023-09-22 | End: 2023-09-24 | Stop reason: HOSPADM

## 2023-09-22 RX ORDER — POLYETHYLENE GLYCOL 3350 17 G/17G
17 POWDER, FOR SOLUTION ORAL DAILY PRN
Status: DISCONTINUED | OUTPATIENT
Start: 2023-09-22 | End: 2023-09-24 | Stop reason: HOSPADM

## 2023-09-22 RX ORDER — NICOTINE POLACRILEX 4 MG
15 LOZENGE BUCCAL
Status: DISCONTINUED | OUTPATIENT
Start: 2023-09-22 | End: 2023-09-24 | Stop reason: HOSPADM

## 2023-09-22 RX ORDER — PROMETHAZINE HYDROCHLORIDE 12.5 MG/1
25 TABLET ORAL ONCE AS NEEDED
Status: DISCONTINUED | OUTPATIENT
Start: 2023-09-22 | End: 2023-09-22 | Stop reason: HOSPADM

## 2023-09-22 RX ORDER — BISACODYL 5 MG/1
5 TABLET, DELAYED RELEASE ORAL DAILY PRN
Status: DISCONTINUED | OUTPATIENT
Start: 2023-09-22 | End: 2023-09-24 | Stop reason: HOSPADM

## 2023-09-22 RX ORDER — SODIUM CHLORIDE, SODIUM LACTATE, POTASSIUM CHLORIDE, CALCIUM CHLORIDE 600; 310; 30; 20 MG/100ML; MG/100ML; MG/100ML; MG/100ML
9 INJECTION, SOLUTION INTRAVENOUS CONTINUOUS PRN
Status: DISCONTINUED | OUTPATIENT
Start: 2023-09-22 | End: 2023-09-22 | Stop reason: HOSPADM

## 2023-09-22 RX ORDER — GLYCOPYRROLATE 0.2 MG/ML
INJECTION INTRAMUSCULAR; INTRAVENOUS AS NEEDED
Status: DISCONTINUED | OUTPATIENT
Start: 2023-09-22 | End: 2023-09-22

## 2023-09-22 RX ORDER — HYDROCODONE BITARTRATE AND ACETAMINOPHEN 5; 325 MG/1; MG/1
1 TABLET ORAL EVERY 4 HOURS PRN
Status: DISCONTINUED | OUTPATIENT
Start: 2023-09-22 | End: 2023-09-24 | Stop reason: HOSPADM

## 2023-09-22 RX ORDER — PANTOPRAZOLE SODIUM 40 MG/10ML
40 INJECTION, POWDER, LYOPHILIZED, FOR SOLUTION INTRAVENOUS
Status: DISCONTINUED | OUTPATIENT
Start: 2023-09-23 | End: 2023-09-24 | Stop reason: HOSPADM

## 2023-09-22 RX ORDER — SODIUM CHLORIDE 0.9 % (FLUSH) 0.9 %
10 SYRINGE (ML) INJECTION AS NEEDED
Status: DISCONTINUED | OUTPATIENT
Start: 2023-09-22 | End: 2023-09-23

## 2023-09-22 RX ORDER — SODIUM CHLORIDE 0.9 % (FLUSH) 0.9 %
10 SYRINGE (ML) INJECTION EVERY 12 HOURS SCHEDULED
Status: DISCONTINUED | OUTPATIENT
Start: 2023-09-22 | End: 2023-09-24 | Stop reason: HOSPADM

## 2023-09-22 RX ORDER — HYDROCODONE BITARTRATE AND ACETAMINOPHEN 7.5; 325 MG/1; MG/1
2 TABLET ORAL EVERY 4 HOURS PRN
Status: DISCONTINUED | OUTPATIENT
Start: 2023-09-22 | End: 2023-09-24 | Stop reason: HOSPADM

## 2023-09-22 RX ORDER — ONDANSETRON 2 MG/ML
4 INJECTION INTRAMUSCULAR; INTRAVENOUS ONCE
Status: COMPLETED | OUTPATIENT
Start: 2023-09-22 | End: 2023-09-22

## 2023-09-22 RX ORDER — CHOLECALCIFEROL (VITAMIN D3) 125 MCG
5 CAPSULE ORAL NIGHTLY PRN
Status: DISCONTINUED | OUTPATIENT
Start: 2023-09-22 | End: 2023-09-24 | Stop reason: HOSPADM

## 2023-09-22 RX ORDER — DEXTROSE MONOHYDRATE 25 G/50ML
25 INJECTION, SOLUTION INTRAVENOUS
Status: DISCONTINUED | OUTPATIENT
Start: 2023-09-22 | End: 2023-09-24 | Stop reason: HOSPADM

## 2023-09-22 RX ORDER — ONDANSETRON 2 MG/ML
4 INJECTION INTRAMUSCULAR; INTRAVENOUS ONCE
Status: DISCONTINUED | OUTPATIENT
Start: 2023-09-22 | End: 2023-09-23

## 2023-09-22 RX ORDER — PROMETHAZINE HYDROCHLORIDE 25 MG/1
25 SUPPOSITORY RECTAL ONCE AS NEEDED
Status: DISCONTINUED | OUTPATIENT
Start: 2023-09-22 | End: 2023-09-22 | Stop reason: HOSPADM

## 2023-09-22 RX ORDER — ACETAMINOPHEN 160 MG/5ML
650 SOLUTION ORAL EVERY 6 HOURS
Status: CANCELLED | OUTPATIENT
Start: 2023-09-22 | End: 2023-09-24

## 2023-09-22 RX ORDER — MIDAZOLAM HYDROCHLORIDE 2 MG/2ML
2 INJECTION, SOLUTION INTRAMUSCULAR; INTRAVENOUS ONCE
Status: COMPLETED | OUTPATIENT
Start: 2023-09-22 | End: 2023-09-22

## 2023-09-22 RX ORDER — SODIUM CHLORIDE 9 MG/ML
100 INJECTION, SOLUTION INTRAVENOUS CONTINUOUS
Status: DISCONTINUED | OUTPATIENT
Start: 2023-09-22 | End: 2023-09-23

## 2023-09-22 RX ORDER — ONDANSETRON 2 MG/ML
INJECTION INTRAMUSCULAR; INTRAVENOUS AS NEEDED
Status: DISCONTINUED | OUTPATIENT
Start: 2023-09-22 | End: 2023-09-22 | Stop reason: SURG

## 2023-09-22 RX ORDER — AMOXICILLIN 250 MG
2 CAPSULE ORAL 2 TIMES DAILY
Status: DISCONTINUED | OUTPATIENT
Start: 2023-09-22 | End: 2023-09-24 | Stop reason: HOSPADM

## 2023-09-22 RX ORDER — ROCURONIUM BROMIDE 10 MG/ML
INJECTION, SOLUTION INTRAVENOUS AS NEEDED
Status: DISCONTINUED | OUTPATIENT
Start: 2023-09-22 | End: 2023-09-22 | Stop reason: SURG

## 2023-09-22 RX ORDER — PROPOFOL 10 MG/ML
VIAL (ML) INTRAVENOUS AS NEEDED
Status: DISCONTINUED | OUTPATIENT
Start: 2023-09-22 | End: 2023-09-22 | Stop reason: SURG

## 2023-09-22 RX ORDER — PANTOPRAZOLE SODIUM 40 MG/10ML
80 INJECTION, POWDER, LYOPHILIZED, FOR SOLUTION INTRAVENOUS ONCE
Status: DISCONTINUED | OUTPATIENT
Start: 2023-09-22 | End: 2023-09-23

## 2023-09-22 RX ORDER — ONDANSETRON 2 MG/ML
4 INJECTION INTRAMUSCULAR; INTRAVENOUS EVERY 6 HOURS PRN
Status: DISCONTINUED | OUTPATIENT
Start: 2023-09-22 | End: 2023-09-24 | Stop reason: HOSPADM

## 2023-09-22 RX ORDER — DEXAMETHASONE SODIUM PHOSPHATE 4 MG/ML
INJECTION, SOLUTION INTRA-ARTICULAR; INTRALESIONAL; INTRAMUSCULAR; INTRAVENOUS; SOFT TISSUE AS NEEDED
Status: DISCONTINUED | OUTPATIENT
Start: 2023-09-22 | End: 2023-09-22 | Stop reason: SURG

## 2023-09-22 RX ORDER — GABAPENTIN 100 MG/1
100 CAPSULE ORAL 3 TIMES DAILY
Status: CANCELLED | OUTPATIENT
Start: 2023-09-22 | End: 2023-09-24

## 2023-09-22 RX ORDER — METOPROLOL TARTRATE 5 MG/5ML
INJECTION INTRAVENOUS AS NEEDED
Status: DISCONTINUED | OUTPATIENT
Start: 2023-09-22 | End: 2023-09-22 | Stop reason: SURG

## 2023-09-22 RX ORDER — SUCCINYLCHOLINE/SOD CL,ISO/PF 100 MG/5ML
SYRINGE (ML) INTRAVENOUS AS NEEDED
Status: DISCONTINUED | OUTPATIENT
Start: 2023-09-22 | End: 2023-09-22 | Stop reason: SURG

## 2023-09-22 RX ORDER — ONDANSETRON 4 MG/1
4 TABLET, FILM COATED ORAL EVERY 6 HOURS PRN
Status: DISCONTINUED | OUTPATIENT
Start: 2023-09-22 | End: 2023-09-24 | Stop reason: HOSPADM

## 2023-09-22 RX ORDER — ACETAMINOPHEN 650 MG/1
650 SUPPOSITORY RECTAL EVERY 4 HOURS PRN
Status: DISCONTINUED | OUTPATIENT
Start: 2023-09-22 | End: 2023-09-24 | Stop reason: HOSPADM

## 2023-09-22 RX ORDER — ASPIRIN 325 MG
325 TABLET ORAL DAILY
Status: DISCONTINUED | OUTPATIENT
Start: 2023-09-22 | End: 2023-09-24 | Stop reason: HOSPADM

## 2023-09-22 RX ADMIN — ROCURONIUM BROMIDE 10 MG: 50 INJECTION INTRAVENOUS at 17:06

## 2023-09-22 RX ADMIN — ASPIRIN 325 MG: 325 TABLET ORAL at 20:05

## 2023-09-22 RX ADMIN — ONDANSETRON 4 MG: 2 INJECTION INTRAMUSCULAR; INTRAVENOUS at 17:37

## 2023-09-22 RX ADMIN — HYDROCODONE BITARTRATE AND ACETAMINOPHEN 2 TABLET: 7.5; 325 TABLET ORAL at 20:05

## 2023-09-22 RX ADMIN — TAMSULOSIN HYDROCHLORIDE 0.4 MG: 0.4 CAPSULE ORAL at 20:05

## 2023-09-22 RX ADMIN — METOPROLOL TARTRATE 5 MG: 1 INJECTION, SOLUTION INTRAVENOUS at 17:20

## 2023-09-22 RX ADMIN — SODIUM CHLORIDE: 9 INJECTION, SOLUTION INTRAVENOUS at 16:57

## 2023-09-22 RX ADMIN — SODIUM CHLORIDE 100 ML/HR: 9 INJECTION, SOLUTION INTRAVENOUS at 20:12

## 2023-09-22 RX ADMIN — SODIUM CHLORIDE 1000 ML: 9 INJECTION, SOLUTION INTRAVENOUS at 15:37

## 2023-09-22 RX ADMIN — ONDANSETRON 4 MG: 2 INJECTION INTRAMUSCULAR; INTRAVENOUS at 15:37

## 2023-09-22 RX ADMIN — CEFTRIAXONE SODIUM 1000 MG: 1 INJECTION, SOLUTION INTRAVENOUS at 17:09

## 2023-09-22 RX ADMIN — ESMOLOL HYDROCHLORIDE 30 MG: 100 INJECTION, SOLUTION INTRAVENOUS at 17:12

## 2023-09-22 RX ADMIN — METOPROLOL TARTRATE 5 MG: 1 INJECTION, SOLUTION INTRAVENOUS at 17:25

## 2023-09-22 RX ADMIN — INSULIN LISPRO 2 UNITS: 100 INJECTION, SOLUTION INTRAVENOUS; SUBCUTANEOUS at 20:11

## 2023-09-22 RX ADMIN — PROPOFOL 200 MG: 10 INJECTION, EMULSION INTRAVENOUS at 17:06

## 2023-09-22 RX ADMIN — MORPHINE SULFATE 4 MG: 4 INJECTION, SOLUTION INTRAMUSCULAR; INTRAVENOUS at 15:37

## 2023-09-22 RX ADMIN — CEFTRIAXONE SODIUM 1000 MG: 1 INJECTION, SOLUTION INTRAVENOUS at 14:06

## 2023-09-22 RX ADMIN — ESMOLOL HYDROCHLORIDE 10 MG: 100 INJECTION, SOLUTION INTRAVENOUS at 17:06

## 2023-09-22 RX ADMIN — Medication 10 ML: at 20:08

## 2023-09-22 RX ADMIN — DEXAMETHASONE SODIUM PHOSPHATE 4 MG: 4 INJECTION, SOLUTION INTRAMUSCULAR; INTRAVENOUS at 17:10

## 2023-09-22 RX ADMIN — Medication 200 MG: at 17:07

## 2023-09-22 RX ADMIN — LIDOCAINE HYDROCHLORIDE 100 MG: 20 INJECTION, SOLUTION EPIDURAL; INFILTRATION; INTRACAUDAL; PERINEURAL at 17:06

## 2023-09-22 RX ADMIN — MIDAZOLAM HYDROCHLORIDE 2 MG: 1 INJECTION, SOLUTION INTRAMUSCULAR; INTRAVENOUS at 16:54

## 2023-09-22 NOTE — ED NOTES
Pt states he understands the procedure, consent form was signed. Pt's clothing was removed and bagged up

## 2023-09-22 NOTE — H&P
[facility]  [facility]: [facility]   Patient Name: Tate Kelley  : 1950  MRN: [id number]  Primary Care Physician:  Cassie Osuna PA  Date of admission: 2023    Subjective abdominal pain, nausea, vomiting, dysuria  Subjective     Chief Complaint: Patient is a 73-year-old male that comes in to the ER with abdominal pain, nausea, vomiting and dysuria.    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    On arrival to the ED, patient had temperature of 98.6, pulse of 105, respiratory rate of 16, blood pressure 119/79, and his SPO2 was 94% on room air.    On imaging, patient has a 5 cm water density lesion in the right kidney which is consistent with cysts but not well characterized.  A 1.5 cm calcific density in the posterior lower lobe of the collecting system of the kidney probably representing layering tiny calcifications.  Moderate left hydronephrosis is slightly worsened in comparison to 2022 an irregular 10 mm stone is seen as well.    Patient follows with Dr. Palacios.  He has had prior kidney stones and stenting.      Personal History     Past Medical History:  Past Medical History:   Diagnosis Date    Arthritis     NECK SPINE    COPD (chronic obstructive pulmonary disease)     NO MEDS/INHALERS, SOA E    DDD (degenerative disc disease), cervical     DM2 (diabetes mellitus, type 2)     ORAL MEDS/CHECKS BS, AVE     Flesh-eating bacteria     L LEG LED TO AMPUTATION    GERD (gastroesophageal reflux disease)     Heart attack     MI 2016, CLEARED BY CARDIOLOGY, PCP (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, WEAK L ARM     Tachycardia     NO MEDS CURRENTLY ASYMPTOMATIC, DENIES CP, SOAE    Uric acid kidney stone 09/25/2019       Past Surgical History:  Past Surgical History:   Procedure Laterality Date    ABDOMINAL SURGERY  2004    UMBILICAL HERNIA REPAIR     AMPUTATION      LBN    COLONOSCOPY      CYSTOSCOPY URETEROSCOPY Left 6/28/2021    Procedure: CYSTOSCOPY URETEROSCOPY RETROGRADE PYELOGRAM STENT EXCHANGE LEFT;  Surgeon: Sarah Palacios MD;  Location: [facility] MAIN OR;  Service: Urology;  Laterality: Left;    ENDOSCOPY      FOOT SURGERY      HERNIA REPAIR      KIDNEY STONE SURGERY      KNEE SURGERY      MOUTH SURGERY         Family History:   Family History   Problem Relation Age of Onset    Hypertension Mother     Leukemia Mother     Heart attack Father     Prostate cancer Father         60    Malig Hyperthermia Neg Hx        Social History:   Social History     Socioeconomic History    Marital status:    Tobacco Use    Smoking status: Never    Smokeless tobacco: Never   Vaping Use    Vaping Use: Never used   Substance and Sexual Activity    Alcohol use: Never    Drug use: Never    Sexual activity: Defer       Home Medications:  aspirin, glipizide, lisinopril, metFORMIN ER, tamsulosin, and vitamin D    Allergies:  Allergies   Allergen Reactions    Saccharin Nausea And Vomiting    Latex Rash       Review of Systems   All systems were reviewed and negative except for: Abdominal pain, nausea, dysuria    Objective   Objective     Vitals:   Temp:  [98.6 °F (37 °C)-100.3 °F (37.9 °C)] 100.3 °F (37.9 °C)  Heart Rate:  [104-112] 105  Resp:  [16-20] 20  BP: (119-141)/(76-84) 141/78    Physical Exam    Constitutional: Awake, alert, no acute distress   Eyes: Pupils equal, sclerae anicteric, no conjunctival injection   HENT: NCAT, mucous membranes moist   Neck: Supple, no thyromegaly, no lymphadenopathy, trachea midline   Respiratory: Clear to auscultation bilaterally, nonlabored respirations    Cardiovascular: RRR, no murmurs, rubs,  or gallops, palpable pedal pulses bilaterally   Gastrointestinal: Positive bowel sounds, soft, nontender, nondistended   Musculoskeletal: Left leg amputation   Psychiatric: Appropriate affect, cooperative   Neurologic: Oriented x 3, strength symmetric in all extremities, Cranial Nerves grossly intact to confrontation, speech clear   Skin: No rashes     Result Review    Result Review:  I have personally reviewed the results from the time of this admission to 9/22/2023 16:34 EDT and agree with these findings:  [x]  Laboratory  []  Microbiology  [x]  Radiology  []  EKG/Telemetry   []  Cardiology/Vascular   []  Pathology  [x]  Old records  []  Other:      Assessment & Plan   Assessment / Plan   #1  Bilateral hydronephrosis.  Worse on the right with a irregular 10 mm stone.  -Plans for surgery today.  Urology consulted.  -IV fluids, antiemetics, pain management, bowel regimen.    #2 UTI  -Rocephin ordered    #3 non-insulin-dependent diabetes  -Insulin sliding scale ordered  -Hold oral hypoglycemics    #4 mild NAOMI on CKD      DVT prophylaxis:  SCD.    CODE STATUS:     Full code    Admission Status:  I believe this patient meets observation status.    Electronically signed by Cassidy Jenkins DO, 09/22/23, 4:16 PM EDT.

## 2023-09-22 NOTE — ANESTHESIA PREPROCEDURE EVALUATION
Anesthesia Evaluation     Patient summary reviewed and Nursing notes reviewed   no history of anesthetic complications:   NPO Solid Status: > 8 hours  NPO Liquid Status: > 2 hours           Airway   Mallampati: III  TM distance: >3 FB  Neck ROM: full  Difficult intubation highly probable  Dental      Pulmonary - normal exam    breath sounds clear to auscultation  (+) COPD,sleep apnea  Cardiovascular - normal exam  Exercise tolerance: good (4-7 METS)    Rhythm: regular  Rate: normal    (+) hypertension, past MI  >12 months      Neuro/Psych  (+) CVA residual symptoms, weakness    ROS Comment: Left sided weakness  GI/Hepatic/Renal/Endo    (+) GERD well controlled, renal disease CRI and stones, diabetes mellitus    Musculoskeletal (-) negative ROS    Abdominal    Substance History - negative use     OB/GYN negative ob/gyn ROS         Other - negative ROS       ROS/Med Hx Other: PAT Nursing Notes unavailable.                   Anesthesia Plan    ASA 4     general       Anesthetic plan, risks, benefits, and alternatives have been provided, discussed and informed consent has been obtained with: patient.      CODE STATUS:

## 2023-09-22 NOTE — ED PROVIDER NOTES
Time: 1:40 PM EDT  Date of encounter:  9/22/2023  Independent Historian/Clinical History and Information was obtained by:   Patient    History is limited by: N/A    Chief Complaint: dysuria      History of Present Illness:  Patient is a 73 y.o. year old male who presents to the emergency department for evaluation of flank pain, dysuria and fever.  He does report a history of kidney stones.  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, WEAK L ARM    Tachycardia     NO MEDS CURRENTLY ASYMPTOMATIC, DENIES CP, SOAE    Uric acid kidney stone 09/25/2019     Past Surgical History:   Procedure Laterality Date    ABDOMINAL SURGERY  2004    UMBILICAL HERNIA REPAIR     AMPUTATION      LBN    COLONOSCOPY      CYSTOSCOPY URETEROSCOPY Left 6/28/2021    Procedure: CYSTOSCOPY URETEROSCOPY RETROGRADE PYELOGRAM STENT EXCHANGE LEFT;  Surgeon: Sarah Palacios MD;  Location: [facility] OR;  Service: Urology;  Laterality: Left;    ENDOSCOPY      FOOT SURGERY      HERNIA REPAIR      KIDNEY STONE SURGERY      KNEE SURGERY      MOUTH SURGERY       Family History   Problem Relation Age of Onset    Hypertension Mother     Leukemia Mother     Heart attack Father     Prostate cancer Father         60    Malig  Hyperthermia Neg Hx        Home Medications:  Prior to Admission medications    Medication Sig Start Date End Date Taking? Authorizing Provider   ALBUTEROL IN Inhale 1 puff As Needed.    Mart Kearney MD   aspirin 325 MG tablet Take 325 mg by mouth Daily.    Mart Kearney MD   Azelastine HCl 137 MCG/SPRAY solution  3/29/22   Mart Kearney MD   cefdinir (OMNICEF) 300 MG capsule Take 1 capsule by mouth 2 (Two) Times a Day. 8/12/22   Sarah Palacios MD   chlorpheniramine (CHLOR-TRIMETON) 4 MG tablet  3/11/22   Mart Kearney MD   diazePAM (VALIUM) 10 MG tablet  6/28/21   Mart Kearney MD   Fish Oil-Cholecalciferol (FISH OIL + D3 PO) Take 3 tablets by mouth.    Mart Kearney MD   glipizide (GLUCOTROL) 5 MG tablet glipizide 5 mg oral tablet take 1 tablet (5 mg) by oral route once daily before a meal   Active    Mart Kearney MD   HYDROcodone-acetaminophen (Norco) 7.5-325 MG per tablet Take 1 tablet by mouth Every 6 (Six) Hours As Needed for Severe Pain . 4/19/22   Sarah Palacios MD   lisinopril (PRINIVIL,ZESTRIL) 10 MG tablet Take 1 tablet by mouth. 1/26/22   Mart Kearney MD   metFORMIN ER (GLUCOPHAGE-XR) 500 MG 24 hr tablet  6/30/21   Mart Kearney MD   potassium citrate (UROCIT-K) 10 MEQ (1080 MG) CR tablet  1/10/22   Mart Kearney MD   SudoGest 30 MG tablet  6/30/21   Mart Kearney MD   tamsulosin (Flomax) 0.4 MG capsule 24 hr capsule Take 1 capsule by mouth Daily. 4/19/22   Sarah Palacios MD   Urea 20 Intensive Hydrating 20 % cream  6/30/21   Mart Kearney MD        Social History:   Social History     Tobacco Use    Smoking status: Never    Smokeless tobacco: Never   Vaping Use    Vaping Use: Never used   Substance Use Topics    Alcohol use: Never    Drug use: Never         Review of Systems:  Review of Systems   Constitutional:  Negative for chills and fever.   HENT:  Negative for congestion, rhinorrhea and  "sore throat.    Eyes:  Negative for pain and visual disturbance.   Respiratory:  Negative for apnea, cough, chest tightness and shortness of breath.    Cardiovascular:  Negative for chest pain and palpitations.   Gastrointestinal:  Negative for abdominal pain, diarrhea, nausea and vomiting.   Genitourinary:  Positive for dysuria and flank pain. Negative for difficulty urinating.   Musculoskeletal:  Negative for joint swelling and myalgias.   Skin:  Negative for color change.   Neurological:  Negative for seizures and headaches.   Psychiatric/Behavioral: Negative.     All other systems reviewed and are negative.     Physical Exam:  /78 (BP Location: Right arm, Patient Position: Lying)   Pulse 105   Temp 100.3 °F (37.9 °C) (Oral)   Resp 20   Ht 185.4 cm (73\")   Wt 133 kg (293 lb 3.4 oz)   SpO2 94%   BMI 38.68 kg/m²     Physical Exam  Vitals and nursing note reviewed.   Constitutional:       General: He is not in acute distress.     Appearance: Normal appearance. He is not toxic-appearing.   HENT:      Head: Normocephalic and atraumatic.      Jaw: There is normal jaw occlusion.   Eyes:      General: Lids are normal.      Extraocular Movements: Extraocular movements intact.      Conjunctiva/sclera: Conjunctivae normal.      Pupils: Pupils are equal, round, and reactive to light.   Cardiovascular:      Rate and Rhythm: Normal rate and regular rhythm.      Pulses: Normal pulses.      Heart sounds: Normal heart sounds.   Pulmonary:      Effort: Pulmonary effort is normal. No respiratory distress.      Breath sounds: Normal breath sounds. No wheezing or rhonchi.   Abdominal:      General: Abdomen is flat.      Palpations: Abdomen is soft.      Tenderness: There is no abdominal tenderness. There is no guarding or rebound.   Musculoskeletal:         General: Normal range of motion.      Cervical back: Normal range of motion and neck supple.      Right lower leg: No edema.      Left lower leg: No edema.   Skin:    "  General: Skin is warm and dry.   Neurological:      Mental Status: He is alert and oriented to person, place, and time. Mental status is at baseline.   Psychiatric:         Mood and Affect: Mood normal.                Procedures:  Procedures      Medical Decision Making:      Comorbidities that affect care:    Kidney stones, leg amputation    External Notes reviewed:    None      The following orders were placed and all results were independently analyzed by me:  Orders Placed This Encounter   Procedures    Urine Culture - Urine,    CT Abdomen Pelvis Without Contrast    [place] Draw    Comprehensive Metabolic Panel    Lipase    Urinalysis With Microscopic If Indicated (No Culture) - Urine, Clean Catch    Lactic Acid, Plasma    CBC Auto Differential    Urinalysis, Microscopic Only - Urine, Clean Catch    NPO Diet NPO Type: Strict NPO    Undress & Gown    Remove Scopolamine Patch 24 Hours After Application    Pulse Oximetry, Continuous    Urology (on-call MD unless specified)    Inpatient Hospitalist Consult    Insert Peripheral IV    Initiate Observation Status    CBC & Differential    Green Top (Gel)    Lavender Top    Gold Top - SST    Light Blue Top       Medications Given in the Emergency Department:  Medications   sodium chloride 0.9 % flush 10 mL ( Intravenous MAR Hold 9/22/23 1640)   HYDROmorphone (DILAUDID) injection 1 mg ( Intravenous MAR Hold 9/22/23 1640)   pantoprazole (PROTONIX) injection 80 mg ( Intravenous MAR Hold 9/22/23 1640)   ondansetron (ZOFRAN) injection 4 mg ( Intravenous MAR Hold 9/22/23 1640)   sodium chloride 0.9 % infusion (has no administration in time range)   cefTRIAXone (ROCEPHIN) IVPB 1,000 mg (has no administration in time range)   lactated ringers infusion (has no administration in time range)   cefTRIAXone (ROCEPHIN) IVPB 1,000 mg (0 mg Intravenous Stopped 9/22/23 1436)   morphine injection 4 mg (4 mg Intravenous Given 9/22/23 1537)   ondansetron (ZOFRAN) injection 4 mg (4 mg  Intravenous Given 9/22/23 1537)   sodium chloride 0.9 % bolus 1,000 mL (1,000 mL Intravenous New Bag 9/22/23 1537)   Midazolam HCl (PF) (VERSED) injection 2 mg (2 mg Intravenous Given 9/22/23 1654)        ED Course:         Labs:    Lab Results (last 24 hours)       Procedure Component Value Units Date/Time    CBC & Differential [[id number]]  (Abnormal) Collected: 09/22/23 1144    Specimen: Blood Updated: 09/22/23 1212    Narrative:      The following orders were created for panel order CBC & Differential.  Procedure                               Abnormality         Status                     ---------                               -----------         ------                     CBC Auto Differential[[id number]]        Abnormal            Final result                 Please view results for these tests on the individual orders.    Comprehensive Metabolic Panel [[id number]]  (Abnormal) Collected: 09/22/23 1144    Specimen: Blood Updated: 09/22/23 1246     Glucose 192 mg/dL      BUN 23 mg/dL      Creatinine 1.67 mg/dL      Sodium 135 mmol/L      Potassium 4.4 mmol/L      Chloride 98 mmol/L      CO2 26.1 mmol/L      Calcium 9.6 mg/dL      Total Protein 7.0 g/dL      Albumin 4.0 g/dL      ALT (SGPT) 17 U/L      AST (SGOT) 12 U/L      Alkaline Phosphatase 92 U/L      Total Bilirubin 0.6 mg/dL      Globulin 3.0 gm/dL      A/G Ratio 1.3 g/dL      BUN/Creatinine Ratio 13.8     Anion Gap 10.9 mmol/L      eGFR 43.2 mL/min/1.73     Narrative:      GFR Normal >60  Chronic Kidney Disease <60  Kidney Failure <15    The GFR formula is only valid for adults with stable renal function between ages 18 and 70.    Lipase [[id number]]  (Normal) Collected: 09/22/23 1144    Specimen: Blood Updated: 09/22/23 1246     Lipase 40 U/L     Lactic Acid, Plasma [[id number]]  (Normal) Collected: 09/22/23 1144    Specimen: Blood Updated: 09/22/23 1241     Lactate 1.4 mmol/L     CBC Auto Differential [[id number]]  (Abnormal) Collected: 09/22/23 1144     Specimen: Blood Updated: 09/22/23 1212     WBC 12.04 10*3/mm3      RBC 4.75 10*6/mm3      Hemoglobin 13.8 g/dL      Hematocrit 43.0 %      MCV 90.5 fL      MCH 29.1 pg      MCHC 32.1 g/dL      RDW 12.3 %      RDW-SD 41.5 fl      MPV 9.7 fL      Platelets 255 10*3/mm3      Neutrophil % 61.5 %      Lymphocyte % 28.6 %      Monocyte % 8.4 %      Eosinophil % 0.7 %      Basophil % 0.4 %      Immature Grans % 0.4 %      Neutrophils, Absolute 7.41 10*3/mm3      Lymphocytes, Absolute 3.44 10*3/mm3      Monocytes, Absolute 1.01 10*3/mm3      Eosinophils, Absolute 0.08 10*3/mm3      Basophils, Absolute 0.05 10*3/mm3      Immature Grans, Absolute 0.05 10*3/mm3      nRBC 0.0 /100 WBC     Urinalysis With Microscopic If Indicated (No Culture) - Urine, Clean Catch [[id number]]  (Abnormal) Collected: 09/22/23 1145    Specimen: Urine, Clean Catch Updated: 09/22/23 1223     Color, UA Yellow     Appearance, UA Turbid     pH, UA --     Comment: Unable to result due to specimen interference.          Specific [place], UA --     Comment: Unable to result due to specimen interference.          Glucose, UA --     Comment: Unable to result due to specimen interference.          Ketones, UA --     Comment: Unable to result due to specimen interference.          Bilirubin, UA --     Comment: Unable to result due to specimen interference.          Blood, UA --     Comment: Unable to result due to specimen interference.          Protein, UA --     Comment: Unable to result due to specimen interference.          Leuk Esterase, UA --     Comment: Unable to result due to specimen interference.          Nitrite, UA --     Comment: Unable to result due to specimen interference.          Urobilinogen, UA --     Comment: Unable to result due to specimen interference.         Urinalysis, Microscopic Only - Urine, Clean Catch [[id number]]  (Abnormal) Collected: 09/22/23 1145    Specimen: Urine, Clean Catch Updated: 09/22/23 1223     RBC, UA       Unable  to determine due to loaded field     /HPF     WBC, UA Too Numerous to Count /HPF      Bacteria, UA 3+ /HPF      Squamous Epithelial Cells, UA       Unable to determine due to loaded field     /HPF     Hyaline Casts, UA       Unable to determine due to loaded field     /LPF     Methodology Automated Microscopy    Urine Culture - Urine, Urine, Clean Catch [[id number]] Collected: 09/22/23 1145    Specimen: Urine, Clean Catch Updated: 09/22/23 1346             Imaging:    CT Abdomen Pelvis Without Contrast    Addendum Date: 9/22/2023    ADDENDUM: The conclusion contains a typo:  The LEFT ureter is dilated to the S2 level.  An irregular 10 mm stone is seen in the LEFT ureter at the S2 level on series 202, image 106.    MELANIE CLAROS MD       Electronically Signed and Approved By: MELANIE CLAROS MD on 9/22/2023 at 16:10             Result Date: 9/22/2023  PROCEDURE: CT ABDOMEN PELVIS WO CONTRAST  COMPARISON: [facility], CT, CT ABDOMEN PELVIS STONE PROTOCOL, 6/16/2022, 10:21.  INDICATIONS: flank pain/DYSURIA  TECHNIQUE: CT images were created without intravenous contrast.   PROTOCOL:   Standard imaging protocol performed    RADIATION:   DLP: 4514 mGy*cm   Automated exposure control was utilized to minimize radiation dose. CONTRAST: 100 cc Isovue 370 I.V. LABS:   eGFR: >60 ml/min/1.73m2  FINDINGS:  The lung bases are clear.  The liver is of normal size.  The liver is of diffusely diminished density consistent with mild fatty infiltration.  The gallbladder is not abnormally distended.  No pancreatic or adrenal mass is evident.  The spleen is of normal size.  5 cm water density lesions in the upper pole and lower pole region of the right kidney are consistent with cysts, and are unchanged.  No right renal or ureteral stone is evident.  There is no evidence of hydronephrosis on the right.  The right ureter is of normal caliber.  1.5 cm by 0.8 cm calcific density in the posterior lower pole collecting  system of the left kidney probably represent layering tiny calcifications.  Moderate left hydroureteronephrosis appear slightly worsened in comparison to 6/16/2022.  The right ureter is dilated to the S2 level.  An irregular 10 mm stone is well seen on series 202, image 106. The distal left ureter is not dilated.  The urinary bladder is not abnormally distended.  Mild mural thickening is evident.  The prostate gland measures 6.4 cm in greatest transverse dimension.  CONTINUED ON NEXT PAGE...    Bowel loops are not abnormally dilated.  No significant stool is evident.  No diverticula are evident.  The anterior abdominal wall is intact, no hernia is evident.  Degenerative changes are seen in the lower thoracic and lumbar spine.        CT scan of the abdomen and pelvis without contrast demonstrating fatty liver.  5 cm water density lesions in the right kidney are consistent with cysts, but not well characterized.  1.5 cm calcific density in the posterior lower pole collecting system of the left kidney probably represents layering tiny calcifications.  Moderate left hydroureteronephrosis is slightly worsened in comparison to 6/16/2022.  The right ureter is dilated to the S2 level.  An irregular 10 mm stone is seen on series 202, image 106.  The distal left ureter is not dilated.  Mild thickening of the wall of the urinary bladder is evident.  The prostate gland is enlarged.       MELANIE CLAROS MD       Electronically Signed and Approved By: MELANIE CLAROS MD on 9/22/2023 at 15:14                Differential Diagnosis and Discussion:    Abdominal Pain: Based on the patient's signs and symptoms, I considered abdominal aortic aneurysm, small bowel obstruction, pancreatitis, acute cholecystitis, acute appendecitis, peptic ulcer disease, gastritis, colitis, endocrine disorders, irritable bowel syndrome and other differential diagnosis an etiology of the patient's abdominal pain.    All labs were reviewed and interpreted by  me.  CT scan radiology impression was interpreted by me.    MDM  Number of Diagnoses or Management Options  Sepsis, due to unspecified organism, unspecified whether acute organ dysfunction present  Ureterolithiasis  Urinary tract infection without hematuria, site unspecified  Diagnosis management comments: In summary this is a 73-year-old male patient who presents to the emergency department for evaluation of flank pain, dysuria, fever.  CT scan of the abdomen pelvis reveals a left 10 mm ureterolithiasis.  Urinalysis positive for infection.  CBC remarkable for leukocytosis.  CMP independently reviewed by me and shows no critical abnormalities.  Given this constellation of symptoms I believe patient has an infected stone.  Urology has been consulted, patient's been given antibiotics.  Patient case has been discussed with the hospitalist team who will admit to the hospital for further evaluation and continuation of treatment.       Amount and/or Complexity of Data Reviewed  Clinical lab tests: reviewed  Review and summarize past medical records: yes (Urine culture from 1 year ago)  Discuss the patient with other providers: yes (ER pharmacist for antibiotic guidance)             Patient Care Considerations:    CT CHEST: I considered ordering a CT scan of the chest, however no respiratory distress      Consultants/Shared Management Plan:    Hospitalist: I have discussed the case with Dr. Jenkins who agrees to accept the patient for admission.  Consultant: I have discussed the case with Dr. Medrano who agrees to consult on the patient.    Social Determinants of Health:    Patient is independent, reliable, and has access to care.       Disposition and Care Coordination:    Admit:   Through independent evaluation of the patient's history, physical, and imperical data, the patient meets criteria for observation/admission to the hospital.        Final diagnoses:   Sepsis, due to unspecified organism, unspecified whether acute  organ dysfunction present   Urinary tract infection without hematuria, site unspecified   Ureterolithiasis        ED Disposition       ED Disposition   Decision to Admit    Condition   --    Comment   Level of Care: Med/Surg [1]   Diagnosis: Nephrolithiasis [199102]   Admitting Physician: SERGIO MEDINA [[id number]]   Attending Physician: SERGIO MEDINA [[id number]]                 This medical record created using voice recognition software.             Dima Canseco MD  09/22/23 9726

## 2023-09-22 NOTE — OP NOTE
CYSTOSCOPY URETERAL CATHETER/STENT INSERTION  Procedure Report    Patient Name:  Tate Kelley  YOB: 1950    Date of Surgery:  9/22/2023     Indications: Left distal ureteral stone 10 mm, acute left pyelonephritis.    Pre-op Diagnosis:   Ureterolithiasis [N20.1]       Post-Op Diagnosis Codes:     * Ureterolithiasis [N20.1]    Procedure/CPT® Codes:      Procedure(s):  CYSTOSCOPY URETERAL CATHETER/STENT INSERTION        Staff:  Surgeon(s):  Brayden Medrano MD         Anesthesia: General    Estimated Blood Loss: none    Implants:    Implant Name Type Inv. Item Serial No.  Lot No. LRB No. Used Action   STNT URETRL ASCERTA 6F 30CM - [id number] Stent STNT URETRL ASCERTA 6F 30CM  [employer] [id number] Left 1 Implanted       Specimen:          NoneNone        Findings: Left distal ureteral stone, left acute pyelonephritis.    Complications: None    Description of Procedure: After informed consent was obtained patient was taken to the operating room.  General anesthesia was administered.  He was placed in a dorsolithotomy position.  Groin is prepped and draped in a sterile fashion.  Cystoscopy was performed the urethra was normal the prostate is normal within the bladder significant purulent urine is seen with poor visibility within the bladder.  The bladder was lavaged several times until the purulent urine was drained and removed from the bladder and then we could visualize both ureteral orifices that were normal the bladder was normal without any tumors or stones.  The ureteral catheter 5 [place] was inserted in the left distal ureter and through it a sensor tip guidewire was passed all the way to the left kidney.  The stone could not be visualized on fluoroscopy.  It should be noted the patient is severely morbidly obese and visibility even with x-ray was poor.  Once the end of the wire was seen in the left renal pelvis I removed ureteral catheter and then passed a 6 [place]  by 30 cm stent over the left wire into the left kidney.  Fluoroscopy showed the proximal end to be in good position cystoscopy showed the distal end to be in good position patient tolerated the procedure well there were no complications he was extubated stable awake alert in good condition and transported to recovery room.                  Brayden Medrano MD     Date: 9/22/2023  Time: 17:40 EDT

## 2023-09-22 NOTE — CONSULTS
[facility]   UROLOGY Consult Note    Patient Name: Tate Kelley  : 1950  MRN: [id number]  Primary Care Physician:  Cassie Osuna PA  Referring Physician: No ref. provider found  Date of admission: 2023    Subjective   Subjective     Chief Complaint: Left flank pain, fevers and chills, nausea    HPI:  Tate Kelley is a 73 y.o. male patient is 73 years old he comes into the emergency room with a 2-day history of left flank pain which is intermittent.  He has had a fever of 101 at home.  He denies any gross hematuria.  He is an insulin-dependent diabetic.  He has a prior history of kidney stones.  CT scan reveals an obstructing 10 mm stone in the left distal ureter around the sacral vertebra.  Also there is some calcification in the lower pole of the left kidney with significant left hydroureteronephrosis.    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, WEAK L ARM    Tachycardia     NO MEDS CURRENTLY ASYMPTOMATIC, DENIES CP, SOAE    Uric acid kidney stone 2019       Past Surgical History:   Procedure Laterality Date    ABDOMINAL SURGERY  2004    UMBILICAL HERNIA REPAIR     AMPUTATION      LBN    COLONOSCOPY      CYSTOSCOPY URETEROSCOPY Left 2021    Procedure: CYSTOSCOPY URETEROSCOPY RETROGRADE PYELOGRAM STENT EXCHANGE LEFT;  Surgeon: Philip  MD Sarah;  Location: [facility] MAIN OR;  Service: Urology;  Laterality: Left;    ENDOSCOPY      FOOT SURGERY      HERNIA REPAIR      KIDNEY STONE SURGERY      KNEE SURGERY      MOUTH SURGERY         Family History: family history includes Heart attack in his father; Hypertension in his mother; Leukemia in his mother; Prostate cancer in his father. Otherwise pertinent FHx was reviewed and not pertinent to current issue.    Social History:  reports that he has never smoked. He has never used smokeless tobacco. He reports that he does not drink alcohol and does not use drugs.    Home Medications:  Albuterol, Azelastine HCl, Fish Oil-Cholecalciferol, HYDROcodone-acetaminophen, aspirin, cefdinir, chlorpheniramine, diazePAM, glipizide, lisinopril, metFORMIN ER, potassium citrate, pseudoephedrine, tamsulosin, and urea    Allergies:  Allergies   Allergen Reactions    Saccharin Nausea And Vomiting    Latex Rash       Objective    Objective     Vitals:   Temp:  [98.6 °F (37 °C)] 98.6 °F (37 °C)  Heart Rate:  [104-112] 112  Resp:  [16] 16  BP: (119-141)/(76-84) 141/76    Physical Exam:   Constitutional: Awake, alert   Eyes:  sclerae anicteric, no conjunctival injection   HENT: NCAT, mucous membranes moist   Respiratory: Clear to auscultation bilaterally, nonlabored respirations    Cardiovascular: RRR, no murmurs, rubs, or gallops, palpable pedal pulses bilaterally   Gastrointestinal: Positive bowel sounds, soft, nontender, nondistended   Musculoskeletal: No bilateral ankle edema, no clubbing or cyanosis to extremities   Psychiatric: Appropriate affect, cooperative   Neurologic: Oriented x 3, strength symmetric in all extremities, Cranial Nerves grossly intact to confrontation, speech clear   Skin: No rashes     Result Review    Result Review:  I have personally reviewed the results from the time of this admission to 9/22/2023 16:14 EDT and agree with these findings:  []  Laboratory  []  Microbiology  []  Radiology  []   EKG/Telemetry   []  Cardiology/Vascular   []  Pathology  []  Old records  []  Other:      Assessment & Plan   Assessment / Plan     Brief Patient Summary:  Tate Kelley is a 73 y.o. male     Active Hospital Problems:  Active Hospital Problems    Diagnosis     **Nephrolithiasis        Plan:   Patient has an obstructing stone in the left distal ureter at the S2 vertebra.  He has significant left hydroureteronephrosis.  At length discussed with patient the pathophysiology of kidney stones.  We discussed at length all the risks benefits alternatives all potential complications of watchful waiting versus proceeding with cystoscopy left ureteral stent placement.  Once the sepsis has resolved we will proceed with left ureteroscopy holmium laser lithotripsy.  Patient agrees and wishes to proceed with the procedure today.    Electronically signed by Brayden Medrano MD, 09/22/23, 4:14 PM EDT.Consults

## 2023-09-22 NOTE — ANESTHESIA POSTPROCEDURE EVALUATION
Patient: Tate Kelley    Procedure Summary       Date: 09/22/23 Room / Location: [facility] OR 07 / [facility] MAIN OR    Anesthesia Start: 1657 Anesthesia Stop: 1752    Procedure: CYSTOSCOPY URETERAL CATHETER/STENT INSERTION (Left) Diagnosis:       Ureterolithiasis      (Ureterolithiasis [N20.1])    Surgeons: Brayden Medrano MD Provider: Kehinde Pastor MD    Anesthesia Type: general ASA Status: 4            Anesthesia Type: general    Vitals  Vitals Value Taken Time   /50 09/22/23 1841   Temp 37.4 °C (99.4 °F) 09/22/23 1820   Pulse 113 09/22/23 1843   Resp 20 09/22/23 1820   SpO2 94 % 09/22/23 1843   Vitals shown include unvalidated device data.        Post Anesthesia Care and Evaluation    Patient location during evaluation: bedside  Patient participation: complete - patient participated  Level of consciousness: awake  Pain management: adequate    Airway patency: patent  PONV Status: none  Cardiovascular status: acceptable and stable  Respiratory status: acceptable  Hydration status: acceptable    Comments: An Anesthesiologist personally participated in the most demanding procedures (including induction and emergence if applicable) in the anesthesia plan, monitored the course of anesthesia administration at frequent intervals and remained physically present and available for immediate diagnosis and treatment of emergencies.

## 2023-09-23 LAB
ANION GAP SERPL CALCULATED.3IONS-SCNC: 11.9 MMOL/L (ref 5–15)
BASOPHILS # BLD AUTO: 0.05 10*3/MM3 (ref 0–0.2)
BASOPHILS NFR BLD AUTO: 0.5 % (ref 0–1.5)
BUN SERPL-MCNC: 20 MG/DL (ref 8–23)
BUN/CREAT SERPL: 13.2 (ref 7–25)
CALCIUM SPEC-SCNC: 8.6 MG/DL (ref 8.6–10.5)
CHLORIDE SERPL-SCNC: 101 MMOL/L (ref 98–107)
CO2 SERPL-SCNC: 20.1 MMOL/L (ref 22–29)
CREAT SERPL-MCNC: 1.52 MG/DL (ref 0.76–1.27)
DEPRECATED RDW RBC AUTO: 43.5 FL (ref 37–54)
EGFRCR SERPLBLD CKD-EPI 2021: 48.4 ML/MIN/1.73
EOSINOPHIL # BLD AUTO: 0.08 10*3/MM3 (ref 0–0.4)
EOSINOPHIL NFR BLD AUTO: 0.7 % (ref 0.3–6.2)
ERYTHROCYTE [DISTWIDTH] IN BLOOD BY AUTOMATED COUNT: 12.6 % (ref 12.3–15.4)
GLUCOSE BLDC GLUCOMTR-MCNC: 152 MG/DL (ref 70–99)
GLUCOSE BLDC GLUCOMTR-MCNC: 176 MG/DL (ref 70–99)
GLUCOSE BLDC GLUCOMTR-MCNC: 190 MG/DL (ref 70–99)
GLUCOSE BLDC GLUCOMTR-MCNC: 194 MG/DL (ref 70–99)
GLUCOSE SERPL-MCNC: 128 MG/DL (ref 65–99)
HCT VFR BLD AUTO: 43 % (ref 37.5–51)
HGB BLD-MCNC: 13.2 G/DL (ref 13–17.7)
IMM GRANULOCYTES # BLD AUTO: 0.04 10*3/MM3 (ref 0–0.05)
IMM GRANULOCYTES NFR BLD AUTO: 0.4 % (ref 0–0.5)
LYMPHOCYTES # BLD AUTO: 3.03 10*3/MM3 (ref 0.7–3.1)
LYMPHOCYTES NFR BLD AUTO: 27.5 % (ref 19.6–45.3)
MAGNESIUM SERPL-MCNC: 2 MG/DL (ref 1.6–2.4)
MCH RBC QN AUTO: 28.7 PG (ref 26.6–33)
MCHC RBC AUTO-ENTMCNC: 30.7 G/DL (ref 31.5–35.7)
MCV RBC AUTO: 93.5 FL (ref 79–97)
MONOCYTES # BLD AUTO: 1.09 10*3/MM3 (ref 0.1–0.9)
MONOCYTES NFR BLD AUTO: 9.9 % (ref 5–12)
NEUTROPHILS NFR BLD AUTO: 6.73 10*3/MM3 (ref 1.7–7)
NEUTROPHILS NFR BLD AUTO: 61 % (ref 42.7–76)
NRBC BLD AUTO-RTO: 0 /100 WBC (ref 0–0.2)
PHOSPHATE SERPL-MCNC: 2.8 MG/DL (ref 2.5–4.5)
PLATELET # BLD AUTO: 217 10*3/MM3 (ref 140–450)
PMV BLD AUTO: 9.8 FL (ref 6–12)
POTASSIUM SERPL-SCNC: 4.9 MMOL/L (ref 3.5–5.2)
RBC # BLD AUTO: 4.6 10*6/MM3 (ref 4.14–5.8)
SODIUM SERPL-SCNC: 133 MMOL/L (ref 136–145)
WBC NRBC COR # BLD: 11.02 10*3/MM3 (ref 3.4–10.8)

## 2023-09-23 PROCEDURE — 80048 BASIC METABOLIC PNL TOTAL CA: CPT | Performed by: PHYSICIAN ASSISTANT

## 2023-09-23 PROCEDURE — 82948 REAGENT STRIP/BLOOD GLUCOSE: CPT

## 2023-09-23 PROCEDURE — 85025 COMPLETE CBC W/AUTO DIFF WBC: CPT | Performed by: HOSPITALIST

## 2023-09-23 PROCEDURE — 84100 ASSAY OF PHOSPHORUS: CPT | Performed by: PHYSICIAN ASSISTANT

## 2023-09-23 PROCEDURE — 63710000001 INSULIN LISPRO (HUMAN) PER 5 UNITS: Performed by: PHYSICIAN ASSISTANT

## 2023-09-23 PROCEDURE — 25010000002 CEFTRIAXONE PER 250 MG: Performed by: PHYSICIAN ASSISTANT

## 2023-09-23 PROCEDURE — 83735 ASSAY OF MAGNESIUM: CPT | Performed by: PHYSICIAN ASSISTANT

## 2023-09-23 RX ADMIN — INSULIN LISPRO 2 UNITS: 100 INJECTION, SOLUTION INTRAVENOUS; SUBCUTANEOUS at 17:35

## 2023-09-23 RX ADMIN — INSULIN LISPRO 2 UNITS: 100 INJECTION, SOLUTION INTRAVENOUS; SUBCUTANEOUS at 08:47

## 2023-09-23 RX ADMIN — ASPIRIN 325 MG: 325 TABLET ORAL at 08:48

## 2023-09-23 RX ADMIN — SENNOSIDES AND DOCUSATE SODIUM 2 TABLET: 50; 8.6 TABLET ORAL at 20:52

## 2023-09-23 RX ADMIN — CEFTRIAXONE SODIUM 2000 MG: 2 INJECTION, POWDER, FOR SOLUTION INTRAMUSCULAR; INTRAVENOUS at 08:52

## 2023-09-23 RX ADMIN — INSULIN LISPRO 2 UNITS: 100 INJECTION, SOLUTION INTRAVENOUS; SUBCUTANEOUS at 20:52

## 2023-09-23 RX ADMIN — Medication 10 ML: at 20:53

## 2023-09-23 RX ADMIN — INSULIN LISPRO 2 UNITS: 100 INJECTION, SOLUTION INTRAVENOUS; SUBCUTANEOUS at 11:57

## 2023-09-23 RX ADMIN — PANTOPRAZOLE SODIUM 40 MG: 40 INJECTION, POWDER, FOR SOLUTION INTRAVENOUS at 06:04

## 2023-09-23 RX ADMIN — ACETAMINOPHEN 650 MG: 325 TABLET ORAL at 06:08

## 2023-09-23 RX ADMIN — Medication 10 ML: at 08:50

## 2023-09-23 RX ADMIN — ACETAMINOPHEN 650 MG: 325 TABLET ORAL at 16:24

## 2023-09-23 RX ADMIN — TAMSULOSIN HYDROCHLORIDE 0.4 MG: 0.4 CAPSULE ORAL at 08:48

## 2023-09-23 NOTE — PLAN OF CARE
Goal Outcome Evaluation:     POC reviewed with: Patient  Status: Progressing    Patient pleasant and cooperative with care. Reported mild headache this shift, relieved with ordered Tylenol, no other pain reported. No complaints of nausea and tolerating regular diet well. Ax1 for transfers from bed to motorized scooter. VSS.

## 2023-09-23 NOTE — CONSULTS
[facility]   Consult Note    Patient Name: Tate Kelley  : 1950  MRN: [id number]  Primary Care Physician:  Cassie Osuna PA  Referring Physician: No ref. provider found  Date of admission: 2023    Consults  Subjective   Subjective     Reason for Consult/ Chief Complaint: Acute left pyelonephritis, left ureteral stone    History of Present Illness  Tate Kelley is a 73 y.o. male patient was admitted yesterday with fevers chills urinary tract infection acute left pyelonephritis and what appeared to be a near 10 mm stone obstructing the kidney on the left side with hydroureteronephrosis.  He underwent emergent ureteral stenting on the left side.  He is doing very well today he denies any pain any nausea or vomiting and is very hungry.  He has not been running a temperature.    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, WEAK L ARM    Tachycardia     NO MEDS CURRENTLY ASYMPTOMATIC, DENIES CP, SOAE    Uric acid kidney stone 2019       Past Surgical History:   Procedure Laterality Date    ABDOMINAL SURGERY      UMBILICAL HERNIA REPAIR     AMPUTATION      LBN    COLONOSCOPY      CYSTOSCOPY URETEROSCOPY Left 2021    Procedure: CYSTOSCOPY URETEROSCOPY RETROGRADE PYELOGRAM STENT EXCHANGE LEFT;  Surgeon: Sarah Palacios MD;  Location: [facility] MAIN OR;  Service: Urology;  Laterality: Left;    CYSTOSCOPY W/ URETERAL STENT  PLACEMENT Left 9/22/2023    Procedure: CYSTOSCOPY URETERAL CATHETER/STENT INSERTION;  Surgeon: Brayden Medrano MD;  Location: [facility] MAIN OR;  Service: Urology;  Laterality: Left;    ENDOSCOPY      FOOT SURGERY      HERNIA REPAIR      KIDNEY STONE SURGERY      KNEE SURGERY      MOUTH SURGERY         Family History: family history includes Heart attack in his father; Hypertension in his mother; Leukemia in his mother; Prostate cancer in his father. Otherwise pertinent FHx was reviewed and not pertinent to current issue.    Social History:  reports that he has never smoked. He has never used smokeless tobacco. He reports that he does not drink alcohol and does not use drugs.    Home Medications:   aspirin, glipizide, lisinopril, metFORMIN ER, tamsulosin, and vitamin D    Allergies:  Allergies   Allergen Reactions    Saccharin Nausea And Vomiting    Latex Rash       Objective    Objective     Vitals:  Temp:  [98.2 °F (36.8 °C)-100.5 °F (38.1 °C)] 99 °F (37.2 °C)  Heart Rate:  [] 102  Resp:  [16-24] 18  BP: ()/(40-84) 113/72  Flow (L/min):  [5] 5    Physical Exam    Result Review    Result Review:  I have personally reviewed the results from the time of this admission to 9/23/2023 09:27 EDT and agree with these findings:  []  Laboratory list / accordion  []  Microbiology  []  Radiology  []  EKG/Telemetry   []  Cardiology/Vascular   []  Pathology  []  Old records  []  Other:  Most notable findings include: Left ureteral stone,      Assessment & Plan   Assessment / Plan     Brief Patient Summary:  Tate Kelley is a 73 y.o. male who left ureteral stone,    Active Hospital Problems:  Active Hospital Problems    Diagnosis     **Nephrolithiasis     Ureterolithiasis      Plan: Patient had a obstructing left ureteral stone and had acute left pyelonephritis.  A stent was placed yesterday and patient is doing well.  My recommendation is 1 patient is not running any fevers chills nausea vomiting to  discharging home and have him follow-up with the urology clinic here in town to have the stone removed at a later date.  He is call me for any questions.      Brayden Medrano MD

## 2023-09-23 NOTE — PROGRESS NOTES
[facility]   Hospitalist Progress Note  Date: 2023  Patient Name: Tate Kelley  : 1950  MRN: [id number]  Date of admission: 2023  Room/Bed: [facility]/1      Subjective Acute left pyelonephritis, left ureteral stone   Subjective     Chief Complaint: Acute left pyelonephritis, left ureteral stone     Summary:Tate Kelley is a 73 y.o. male patient was admitted yesterday with fevers chills urinary tract infection acute left pyelonephritis and what appeared to be a near 10 mm stone obstructing the kidney on the left side with hydroureteronephrosis.  He underwent emergent ureteral stenting on the left side.  He is doing very well today he denies any pain any nausea or vomiting and is very hungry.  He has not been running a temperature.     Interval Followup: Patient improved. However, still not feeling great.  He had a cystoscopy and stent placed on 2023    Review of Systems    All systems reviewed and negative except for what is outlined above.      Objective   Objective     Vitals:   Temp:  [98.2 °F (36.8 °C)-100.5 °F (38.1 °C)] 98.3 °F (36.8 °C)  Heart Rate:  [] 94  Resp:  [18-24] 18  BP: ()/(40-84) 112/54  Flow (L/min):  [5] 5    Physical Exam   General: Awake, alert, NAD  HENT: NCAT, MMM  Eyes: pupils equal, no scleral icterus  Cardiovascular: RRR, no murmurs   Pulmonary: CTA bilaterally; no wheezes; no conversational dyspnea  Gastrointestinal: S/ND/NT, +BS  Musculoskeletal: No gross deformities  Skin: No jaundice, no rash on exposed skin appreciated  Neuro: CN II through XII grossly intact; speech clear; no tremor  Psych: Mood and affect appropriate  : No Abebe catheter; no suprapubic tenderness    Result Review    Result Review:  I have personally reviewed these results:  [x]  Laboratory      Lab 23  0444 23  1144   WBC 11.02* 12.04*   HEMOGLOBIN 13.2 13.8   HEMATOCRIT 43.0 43.0   PLATELETS 217 255   NEUTROS ABS 6.73 7.41*   IMMATURE GRANS (ABS) 0.04  0.05   LYMPHS ABS 3.03 3.44*   MONOS ABS 1.09* 1.01*   EOS ABS 0.08 0.08   MCV 93.5 90.5   LACTATE  --  1.4         Lab 09/23/23  0444 09/22/23  2045 09/22/23  1144   SODIUM 133*  --  135*   POTASSIUM 4.9  --  4.4   CHLORIDE 101  --  98   CO2 20.1*  --  26.1   ANION GAP 11.9  --  10.9   BUN 20  --  23   CREATININE 1.52*  --  1.67*   EGFR 48.4*  --  43.2*   GLUCOSE 128*  --  192*   CALCIUM 8.6  --  9.6   MAGNESIUM 2.0 1.9  --    PHOSPHORUS 2.8 2.6  --          Lab 09/22/23  1144   TOTAL PROTEIN 7.0   ALBUMIN 4.0   GLOBULIN 3.0   ALT (SGPT) 17   AST (SGOT) 12   BILIRUBIN 0.6   ALK PHOS 92   LIPASE 40                     Brief Urine Lab Results  (Last result in the past 365 days)        Color   Clarity   Blood   Leuk Est   Nitrite   Protein   CREAT   Urine HCG        09/22/23 1145 Yellow   Turbid   --  Comment: Unable to result due to specimen interference.     --  Comment: Unable to result due to specimen interference.     --  Comment: Unable to result due to specimen interference.     --  Comment: Unable to result due to specimen interference.                   [x]  Microbiology   Microbiology Results (last 10 days)       ** No results found for the last 240 hours. **          [x]  Radiology  CT Abdomen Pelvis Without Contrast    Addendum Date: 9/22/2023    ADDENDUM: The conclusion contains a typo:  The LEFT ureter is dilated to the S2 level.  An irregular 10 mm stone is seen in the LEFT ureter at the S2 level on series 202, image 106.    MELANIE CLAROS MD       Electronically Signed and Approved By: MELANIE CLAROS MD on 9/22/2023 at 16:10             Result Date: 9/22/2023    CT scan of the abdomen and pelvis without contrast demonstrating fatty liver.  5 cm water density lesions in the right kidney are consistent with cysts, but not well characterized.  1.5 cm calcific density in the posterior lower pole collecting system of the left kidney probably represents layering tiny calcifications.  Moderate left  hydroureteronephrosis is slightly worsened in comparison to 6/16/2022.  The right ureter is dilated to the S2 level.  An irregular 10 mm stone is seen on series 202, image 106.  The distal left ureter is not dilated.  Mild thickening of the wall of the urinary bladder is evident.  The prostate gland is enlarged.       MELANIE CLAROS MD       Electronically Signed and Approved By: MELANIE CLAROS MD on 9/22/2023 at 15:14            [x]  EKG/Telemetry   []  Cardiology/Vascular   [x]  Pathology  [x]  Old records  []  Other:    Assessment & Plan   Assessment / Plan   #1  Bilateral hydronephrosis.  Worse on the right with a irregular 10 mm stone.  -Cystoscopy and stent placed.   -await urine culture  -continue rocephin   -antiemetics, pain management, bowel regimen.   -diet advanced  -Patient will follow up with local urology on DC.     #2 UTI  -Rocephin ordered.  F/U UC.      #3 non-insulin-dependent diabetes  -Insulin sliding scale ordered  -Hold oral hypoglycemics     #4 mild NAOMI on CKD-at baseline  -follows with Dr. Rosenberg.        Discussed with RN.    DVT prophylaxis:  Mechanical DVT prophylaxis orders are present.    CODE STATUS:   Level Of Support Discussed With: Patient  Code Status (Patient has no pulse and is not breathing): CPR (Attempt to Resuscitate)  Medical Interventions (Patient has pulse or is breathing): Full Support      Electronically signed by Cassidy Jenkins DO, 09/23/23, 12:45 PM EDT.

## 2023-09-23 NOTE — CASE MANAGEMENT/SOCIAL WORK
Discharge Planning Assessment   Pretty     Patient Name: Tate Kelley  MRN: [id number]  Today's Date: 9/23/2023    Admit Date: 9/22/2023    Plan: Pt lives with wife. Pt plans to return home indpednently. PCP: [facility] Pharm: [facility]. Pt does not want to use any other pharmacy. Pt stated he will wait until Monday to pick his medications up if needed. 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 Pt has amputation of one leg. Pt denies any fin stressors. CPAP supplies through [employer]. SW/CM will continue to follow for needs.   Discharge Needs Assessment       Row Name 09/23/23 1444       Living Environment    People in Home spouse    Current Living Arrangements home    Potentially Unsafe Housing Conditions none    Primary Care Provided by self    Provides Primary Care For no one    Family Caregiver if Needed spouse    Quality of Family Relationships helpful;involved;supportive    Able to Return to Prior Arrangements yes       Resource/Environmental Concerns    Resource/Environmental Concerns none    Transportation Concerns none       Food Insecurity    Within the past 12 months, you worried that your food would run out before you got the money to buy more. Never true    Within the past 12 months, the food you bought just didn't last and you didn't have money to get more. Never true       Transition Planning    Patient/Family Anticipates Transition to home with family    Patient/Family Anticipated Services at Transition none    Transportation Anticipated family or friend will provide       Discharge Needs Assessment    Equipment Currently Used at Home wheelchair, motorized;cpap;commode;shower chair    Concerns to be Addressed discharge planning    Anticipated Changes Related to Illness none    Equipment Needed After Discharge none    Discharge Coordination/Progress Pt lives with wife. Pt plans to return home indpednently. PCP: [facility] Pharm: [facility]. Pt does not want to use any other pharmacy. Pt  stated he will wait until Monday to pick his medications up if needed. 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 Pt has amputation of one leg. Pt denies any fin stressors. CPAP supplies through [employer]. SW/CM will continue to follow for needs.                   Discharge Plan       Row Name 09/23/23 1447       Plan    Plan Pt lives with wife. Pt plans to return home indpednently. PCP: Enrrique, Pharm: [facility]. Pt does not want to use any other pharmacy. Pt stated he will wait until Monday to pick his medications up if needed. 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 Pt has amputation of one leg. Pt denies any fin stressors. CPAP supplies through [employer]. SW/CM will continue to follow for needs.                  Continued Care and Services - Admitted Since 9/22/2023    Coordination has not been started for this encounter.          Demographic Summary       Row Name 09/23/23 1442       General Information    Admission Type observation    Arrived From emergency department    Referral Source admission list    Reason for Consult discharge planning    Preferred Language English       Contact Information    Permission Granted to Share Info With lay caregiver    Contact Information Obtained for lay caregiver       Lay Caregiver Information    Name, Lay Caregiver Shefali Kelley    Phone, Lay Caregiver [phone]                   Functional Status       Row Name 09/23/23 1443       Functional Status    Usual Activity Tolerance good    Current Activity Tolerance good       Physical Activity    On average, how many days per week do you engage in moderate to strenuous exercise (like a brisk walk)? 3 days    On average, how many minutes do you engage in exercise at this level? 60 min    Number of minutes of exercise per week 180       Assessment of Health Literacy    How often do you have someone help you read hospital materials? Never    How often do you have problems learning about your medical condition because  of difficulty understanding written information? Never    How often do you have a problem understanding what is told to you about your medical condition? Never    How confident are you filling out medical forms by yourself? Extremely    Health Literacy Excellent       Functional Status, IADL    Medications independent    Meal Preparation independent    Housekeeping independent    Laundry independent    Shopping independent    IADL Comments Pt is very independent at home. Pt 's wife can help if needed. Pt works on a farm       Mental Status    General Appearance WDL WDL       Mental Status Summary    Recent Changes in Mental Status/Cognitive Functioning no changes       Employment/    Employment Status disabled;, previous service;retired                   Psychosocial    No documentation.                  Abuse/Neglect    No documentation.                  Legal       Row Name 09/23/23 7772       Financial Resource Strain    How hard is it for you to pay for the very basics like food, housing, medical care, and heating? Not hard       Financial/Legal    Source of Income disability;pension/FCI;VA pension    Application for Public Assistance not applied       Legal    Criminal Activity/Legal Involvement none                   Substance Abuse    No documentation.                  Patient Forms    No documentation.                     Shaista Villarreal

## 2023-09-24 ENCOUNTER — READMISSION MANAGEMENT (OUTPATIENT)
Dept: CALL CENTER | Facility: HOSPITAL | Age: 73
End: 2023-09-24
Payer: OTHER GOVERNMENT

## 2023-09-24 VITALS
HEART RATE: 104 BPM | TEMPERATURE: 98.2 F | HEIGHT: 73 IN | RESPIRATION RATE: 18 BRPM | OXYGEN SATURATION: 97 % | BODY MASS INDEX: 38.86 KG/M2 | WEIGHT: 293.21 LBS | SYSTOLIC BLOOD PRESSURE: 125 MMHG | DIASTOLIC BLOOD PRESSURE: 69 MMHG

## 2023-09-24 LAB
ALBUMIN SERPL-MCNC: 3.5 G/DL (ref 3.5–5.2)
ALBUMIN/GLOB SERPL: 1.1 G/DL
ALP SERPL-CCNC: 81 U/L (ref 39–117)
ALT SERPL W P-5'-P-CCNC: 23 U/L (ref 1–41)
ANION GAP SERPL CALCULATED.3IONS-SCNC: 11.3 MMOL/L (ref 5–15)
ANION GAP SERPL CALCULATED.3IONS-SCNC: 12.2 MMOL/L (ref 5–15)
AST SERPL-CCNC: 16 U/L (ref 1–40)
BASOPHILS # BLD AUTO: 0.05 10*3/MM3 (ref 0–0.2)
BASOPHILS NFR BLD AUTO: 0.5 % (ref 0–1.5)
BILIRUB SERPL-MCNC: 0.3 MG/DL (ref 0–1.2)
BUN SERPL-MCNC: 17 MG/DL (ref 8–23)
BUN SERPL-MCNC: 18 MG/DL (ref 8–23)
BUN/CREAT SERPL: 12.6 (ref 7–25)
BUN/CREAT SERPL: 12.7 (ref 7–25)
CALCIUM SPEC-SCNC: 8.9 MG/DL (ref 8.6–10.5)
CALCIUM SPEC-SCNC: 9.3 MG/DL (ref 8.6–10.5)
CHLORIDE SERPL-SCNC: 100 MMOL/L (ref 98–107)
CHLORIDE SERPL-SCNC: 102 MMOL/L (ref 98–107)
CO2 SERPL-SCNC: 20.8 MMOL/L (ref 22–29)
CO2 SERPL-SCNC: 23.7 MMOL/L (ref 22–29)
CREAT SERPL-MCNC: 1.34 MG/DL (ref 0.76–1.27)
CREAT SERPL-MCNC: 1.43 MG/DL (ref 0.76–1.27)
D-LACTATE SERPL-SCNC: 1.5 MMOL/L (ref 0.5–2)
DEPRECATED RDW RBC AUTO: 40.5 FL (ref 37–54)
EGFRCR SERPLBLD CKD-EPI 2021: 52.1 ML/MIN/1.73
EGFRCR SERPLBLD CKD-EPI 2021: 56.3 ML/MIN/1.73
EOSINOPHIL # BLD AUTO: 0.21 10*3/MM3 (ref 0–0.4)
EOSINOPHIL NFR BLD AUTO: 2.1 % (ref 0.3–6.2)
ERYTHROCYTE [DISTWIDTH] IN BLOOD BY AUTOMATED COUNT: 12.3 % (ref 12.3–15.4)
GLOBULIN UR ELPH-MCNC: 3.2 GM/DL
GLUCOSE BLDC GLUCOMTR-MCNC: 189 MG/DL (ref 70–99)
GLUCOSE SERPL-MCNC: 206 MG/DL (ref 65–99)
GLUCOSE SERPL-MCNC: 207 MG/DL (ref 65–99)
HCT VFR BLD AUTO: 38.7 % (ref 37.5–51)
HGB BLD-MCNC: 12.3 G/DL (ref 13–17.7)
IMM GRANULOCYTES # BLD AUTO: 0.04 10*3/MM3 (ref 0–0.05)
IMM GRANULOCYTES NFR BLD AUTO: 0.4 % (ref 0–0.5)
LYMPHOCYTES # BLD AUTO: 3.81 10*3/MM3 (ref 0.7–3.1)
LYMPHOCYTES NFR BLD AUTO: 37.7 % (ref 19.6–45.3)
MAGNESIUM SERPL-MCNC: 2 MG/DL (ref 1.6–2.4)
MCH RBC QN AUTO: 28.5 PG (ref 26.6–33)
MCHC RBC AUTO-ENTMCNC: 31.8 G/DL (ref 31.5–35.7)
MCV RBC AUTO: 89.6 FL (ref 79–97)
MONOCYTES # BLD AUTO: 1.01 10*3/MM3 (ref 0.1–0.9)
MONOCYTES NFR BLD AUTO: 10 % (ref 5–12)
NEUTROPHILS NFR BLD AUTO: 4.99 10*3/MM3 (ref 1.7–7)
NEUTROPHILS NFR BLD AUTO: 49.3 % (ref 42.7–76)
NRBC BLD AUTO-RTO: 0 /100 WBC (ref 0–0.2)
PHOSPHATE SERPL-MCNC: 2.3 MG/DL (ref 2.5–4.5)
PLATELET # BLD AUTO: 229 10*3/MM3 (ref 140–450)
PMV BLD AUTO: 9.7 FL (ref 6–12)
POTASSIUM SERPL-SCNC: 4.3 MMOL/L (ref 3.5–5.2)
POTASSIUM SERPL-SCNC: 4.4 MMOL/L (ref 3.5–5.2)
PROCALCITONIN SERPL-MCNC: 0.19 NG/ML (ref 0–0.25)
PROT SERPL-MCNC: 6.7 G/DL (ref 6–8.5)
RBC # BLD AUTO: 4.32 10*6/MM3 (ref 4.14–5.8)
SODIUM SERPL-SCNC: 135 MMOL/L (ref 136–145)
SODIUM SERPL-SCNC: 135 MMOL/L (ref 136–145)
WBC NRBC COR # BLD: 10.11 10*3/MM3 (ref 3.4–10.8)

## 2023-09-24 PROCEDURE — 83605 ASSAY OF LACTIC ACID: CPT | Performed by: UROLOGY

## 2023-09-24 PROCEDURE — 83735 ASSAY OF MAGNESIUM: CPT | Performed by: PHYSICIAN ASSISTANT

## 2023-09-24 PROCEDURE — 80053 COMPREHEN METABOLIC PANEL: CPT | Performed by: PHYSICIAN ASSISTANT

## 2023-09-24 PROCEDURE — 85025 COMPLETE CBC W/AUTO DIFF WBC: CPT | Performed by: PHYSICIAN ASSISTANT

## 2023-09-24 PROCEDURE — 84145 PROCALCITONIN (PCT): CPT | Performed by: UROLOGY

## 2023-09-24 PROCEDURE — 84100 ASSAY OF PHOSPHORUS: CPT | Performed by: PHYSICIAN ASSISTANT

## 2023-09-24 PROCEDURE — 25010000002 CEFTRIAXONE PER 250 MG: Performed by: PHYSICIAN ASSISTANT

## 2023-09-24 PROCEDURE — 82948 REAGENT STRIP/BLOOD GLUCOSE: CPT

## 2023-09-24 PROCEDURE — 63710000001 INSULIN LISPRO (HUMAN) PER 5 UNITS: Performed by: PHYSICIAN ASSISTANT

## 2023-09-24 RX ORDER — LEVOFLOXACIN 750 MG/1
750 TABLET ORAL DAILY
Qty: 4 TABLET | Refills: 0 | Status: SHIPPED | OUTPATIENT
Start: 2023-09-24

## 2023-09-24 RX ADMIN — TAMSULOSIN HYDROCHLORIDE 0.4 MG: 0.4 CAPSULE ORAL at 09:38

## 2023-09-24 RX ADMIN — PANTOPRAZOLE SODIUM 40 MG: 40 INJECTION, POWDER, FOR SOLUTION INTRAVENOUS at 05:03

## 2023-09-24 RX ADMIN — INSULIN LISPRO 2 UNITS: 100 INJECTION, SOLUTION INTRAVENOUS; SUBCUTANEOUS at 08:08

## 2023-09-24 RX ADMIN — CEFTRIAXONE SODIUM 2000 MG: 2 INJECTION, POWDER, FOR SOLUTION INTRAMUSCULAR; INTRAVENOUS at 09:38

## 2023-09-24 RX ADMIN — ASPIRIN 325 MG: 325 TABLET ORAL at 09:38

## 2023-09-24 RX ADMIN — Medication 10 ML: at 09:38

## 2023-09-24 NOTE — PLAN OF CARE
Goal Outcome Evaluation:  Plan of Care Reviewed With: patient        Progress: improving  Outcome Evaluation: VSS, no c/o pain or discomfort. Patient able to transfer from bed to chair with standby assisst. Patient did not rest well this shift. C/o waking up d/t not breathing. Patient stated wears home CPAP, O2 stat above 94% on 4L. Encouraged to utilize incentive spirometer. Patient demo. correct usage. Patient snoring at this time with door open patient in view and room close to nurse station.Carolyn De La Torre LPN

## 2023-09-24 NOTE — DISCHARGE SUMMARY
[facility]         HOSPITALIST  DISCHARGE SUMMARY    Patient Name: Tate Kelley  : 1950  MRN: [id number]    Date of Admission: 2023  Date of Discharge:  2023  Primary Care Physician: Cassie Osuna PA    Consults       Date and Time Order Name Status Description    2023  3:30 PM Inpatient Hospitalist Consult      2023  3:22 PM Urology (on-call MD unless specified)              Active and Resolved Hospital Problems:  Active Hospital Problems    Diagnosis POA    **Nephrolithiasis [N20.0] Yes    Ureterolithiasis [N20.1] Unknown      Resolved Hospital Problems   No resolved problems to display.       Hospital Course     Hospital Course:  shreyas Kelley is a 73 y.o. male patient was admitted yesterday with fevers chills urinary tract infection acute left pyelonephritis and what appeared to be a near 10 mm stone obstructing the kidney on the left side with hydroureteronephrosis.  He underwent emergent ureteral stenting on the left side.  He is doing very well today he denies any pain any nausea or vomiting and is very hungry.  He has not been running a temperature.      #1  Bilateral hydronephrosis.  Worse on the right with a irregular 10 mm stone.  -Cystoscopy and stent placed on 2023.   Treated with rocephin in the hospital.  Growing gram negative bacilli.  Will d/c on levaquin.    -antiemetics, pain management, bowel regimen.  -Patient will follow up with local urology on DC.      #2 UTI  -Rocephin ordered. Complete 7 days of antibiotics with levaquin.       #3 non-insulin-dependent diabetes  -Insulin sliding scale ordered  -Hold oral hypoglycemics     #4 mild NAOMI on CKD-at baseline  -follows with Dr. Rosenberg.     DISCHARGE Follow Up Recommendations for labs and diagnostics: follow up with Dr. MARTA Beverly.        Day of Discharge     Vital Signs:  Temp:  [98.2 °F (36.8 °C)-99.9 °F (37.7 °C)] 98.2 °F (36.8 °C)  Heart Rate:  [] 104  Resp:   [18-20] 18  BP: ()/(48-69) 125/69  Flow (L/min):  [4-6] 4  Physical Exam:   General: Awake, alert, NAD  HENT: NCAT, MMM  Eyes: pupils equal, no scleral icterus  Cardiovascular: RRR, no murmurs   Pulmonary: CTA bilaterally; no wheezes; no conversational dyspnea  Gastrointestinal: S/ND/NT, +BS  Musculoskeletal: No gross deformities  Skin: No jaundice, no rash on exposed skin appreciated  Neuro: CN II through XII grossly intact; speech clear; no tremor  Psych: Mood and affect appropriate  : No Abebe catheter; no suprapubic tenderness    Discharge Details        Discharge Medications        New Medications        Instructions Start Date   levoFLOXacin 750 MG tablet  Commonly known as: Levaquin   750 mg, Oral, Daily             Continue These Medications        Instructions Start Date   aspirin 325 MG tablet   650 mg, Oral, Every Morning      aspirin 81 MG EC tablet   81 mg, Oral, Every Evening      glipizide 5 MG tablet  Commonly known as: GLUCOTROL   Take 1 tablet by mouth 2 (Two) Times a Day Before Meals.      lisinopril 10 MG tablet  Commonly known as: PRINIVIL,ZESTRIL   5 mg, Oral, Daily      metFORMIN  MG 24 hr tablet  Commonly known as: GLUCOPHAGE-XR   2,000 mg, Oral, Every Evening      tamsulosin 0.4 MG capsule 24 hr capsule  Commonly known as: Flomax   0.4 mg, Oral, Daily      vitamin D 1.25 MG (10927 UT) capsule capsule  Commonly known as: ERGOCALCIFEROL   50,000 Units, Oral, Weekly               Allergies   Allergen Reactions    Saccharin Nausea And Vomiting    Latex Rash       Discharge Disposition:  Home or Self Care    Diet:  Hospital:  Diet Order   Procedures    Diet: Diabetic Diets; Consistent Carbohydrate; Texture: Regular Texture (IDDSI 7); Fluid Consistency: Thin (IDDSI 0)       Discharge Activity:       CODE STATUS:  Code Status and Medical Interventions:   Ordered at: 09/22/23 6229     Level Of Support Discussed With:    Patient     Code Status (Patient has no pulse and is not  breathing):    CPR (Attempt to Resuscitate)     Medical Interventions (Patient has pulse or is breathing):    Full Support         No future appointments.        Pertinent  and/or Most Recent Results     PROCEDURES:   See above    LAB RESULTS:      Lab 09/24/23  0504 09/23/23  0444 09/22/23  1144   WBC 10.11 11.02* 12.04*   HEMOGLOBIN 12.3* 13.2 13.8   HEMATOCRIT 38.7 43.0 43.0   PLATELETS 229 217 255   NEUTROS ABS 4.99 6.73 7.41*   IMMATURE GRANS (ABS) 0.04 0.04 0.05   LYMPHS ABS 3.81* 3.03 3.44*   MONOS ABS 1.01* 1.09* 1.01*   EOS ABS 0.21 0.08 0.08   MCV 89.6 93.5 90.5   LACTATE  --   --  1.4         Lab 09/24/23  0504 09/23/23  0444 09/22/23  2045 09/22/23  1144   SODIUM 135* 133*  --  135*   POTASSIUM 4.3 4.9  --  4.4   CHLORIDE 100 101  --  98   CO2 23.7 20.1*  --  26.1   ANION GAP 11.3 11.9  --  10.9   BUN 18 20  --  23   CREATININE 1.43* 1.52*  --  1.67*   EGFR 52.1* 48.4*  --  43.2*   GLUCOSE 207* 128*  --  192*   CALCIUM 8.9 8.6  --  9.6   MAGNESIUM 2.0 2.0 1.9  --    PHOSPHORUS 2.3* 2.8 2.6  --          Lab 09/22/23  1144   TOTAL PROTEIN 7.0   ALBUMIN 4.0   GLOBULIN 3.0   ALT (SGPT) 17   AST (SGOT) 12   BILIRUBIN 0.6   ALK PHOS 92   LIPASE 40                     Brief Urine Lab Results  (Last result in the past 365 days)        Color   Clarity   Blood   Leuk Est   Nitrite   Protein   CREAT   Urine HCG        09/22/23 1145 Yellow   Turbid   --  Comment: Unable to result due to specimen interference.     --  Comment: Unable to result due to specimen interference.     --  Comment: Unable to result due to specimen interference.     --  Comment: Unable to result due to specimen interference.                   Microbiology Results (last 10 days)       Procedure Component Value - Date/Time    Blood Culture - Blood, Hand, Right [[id number]]  (Normal) Collected: 09/22/23 2045    Lab Status: Preliminary result Specimen: Blood from Hand, Right Updated: 09/23/23 2116     Blood Culture No growth at 24 hours    Blood  Culture - Blood, Hand, Left [[id number]]  (Normal) Collected: 09/22/23 2045    Lab Status: Preliminary result Specimen: Blood from Hand, Left Updated: 09/23/23 2116     Blood Culture No growth at 24 hours    Urine Culture - Urine, Urine, Clean Catch [[id number]]  (Abnormal) Collected: 09/22/23 1145    Lab Status: Preliminary result Specimen: Urine, Clean Catch Updated: 09/23/23 1302     Urine Culture 50,000 CFU/mL Gram Negative Bacilli    Narrative:      Colonization of the urinary tract without infection is common. Treatment is discouraged unless the patient is symptomatic, pregnant, or undergoing an invasive urologic procedure.            CT Abdomen Pelvis Without Contrast    Addendum Date: 9/22/2023    ADDENDUM: The conclusion contains a typo:  The LEFT ureter is dilated to the S2 level.  An irregular 10 mm stone is seen in the LEFT ureter at the S2 level on series 202, image 106.    MELANIE CLAROS MD       Electronically Signed and Approved By: MELANIE CLAROS MD on 9/22/2023 at 16:10             Result Date: 9/22/2023    CT scan of the abdomen and pelvis without contrast demonstrating fatty liver.  5 cm water density lesions in the right kidney are consistent with cysts, but not well characterized.  1.5 cm calcific density in the posterior lower pole collecting system of the left kidney probably represents layering tiny calcifications.  Moderate left hydroureteronephrosis is slightly worsened in comparison to 6/16/2022.  The right ureter is dilated to the S2 level.  An irregular 10 mm stone is seen on series 202, image 106.  The distal left ureter is not dilated.  Mild thickening of the wall of the urinary bladder is evident.  The prostate gland is enlarged.       MELANIE CLAROS MD       Electronically Signed and Approved By: MELANIE CLAROS MD on 9/22/2023 at 15:14                           Labs Pending at Discharge:  Pending Labs       Order Current Status    Blood Culture - Blood, Hand, Left Preliminary result     Blood Culture - Blood, Hand, Right Preliminary result    Urine Culture - Urine, Urine, Clean Catch Preliminary result              Time spent on Discharge including face to face service:  greater than 30 minutes    Electronically signed by Cassidy Jenkins DO, 09/24/23, 9:14 AM EDT.

## 2023-09-24 NOTE — PROGRESS NOTES
[facility]     Progress Note    Patient Name: Tate Kelley  : 1950  MRN: [id number]  Primary Care Physician:  Cassie Osuna PA  Date of admission: 2023    Subjective   Subjective     Chief Complaint: Patient has no complaints today.    Flank Pain    Patient Reports patient has tolerated the diet ambulated, has had a bowel movement, and voids well.    Review of Systems   Genitourinary:  Positive for flank pain.     Objective   Objective     Vitals:   Temp:  [98.2 °F (36.8 °C)-99.9 °F (37.7 °C)] 98.2 °F (36.8 °C)  Heart Rate:  [] 104  Resp:  [18-20] 18  BP: ()/(48-69) 125/69  Flow (L/min):  [4-6] 4    Physical Exam     Result Review    Result Review:  I have personally reviewed the results from the time of this admission to 2023 10:03 EDT and agree with these findings:  []  Laboratory list / accordion  []  Microbiology  []  Radiology  []  EKG/Telemetry   []  Cardiology/Vascular   []  Pathology  []  Old records  []  Other:  Most notable findings include: Left ureteral stone      Assessment & Plan   Assessment / Plan     Brief Patient Summary:  Tate Kelley is a 73 y.o. male who left ureteral stone with acute left pyelonephritis.  Status post left ureteral stent placement.    Active Hospital Problems:  Active Hospital Problems    Diagnosis     **Nephrolithiasis     Ureterolithiasis      Plan:   Plan for the patient is to be discharged home on oral antibiotics and then have him follow-up with urology clinic in 1 week where they will need to schedule him for left ureteroscopy holmium laser lithotripsy and stent placement.  I discussed this with patient and he understands and will follow-up.    DVT prophylaxis:  Mechanical DVT prophylaxis orders are present.    CODE STATUS:    Level Of Support Discussed With: Patient  Code Status (Patient has no pulse and is not breathing): CPR (Attempt to Resuscitate)  Medical Interventions (Patient has pulse or is  breathing): Full Support    Disposition:  I expect patient to be discharged home.    Brayden Medrano MD

## 2023-09-25 LAB — BACTERIA SPEC AEROBE CULT: ABNORMAL

## 2023-09-25 NOTE — OUTREACH NOTE
Prep Survey      Flowsheet Row Responses   [facility] patient discharged from? [facility]   Is LACE score < 7 ? No   Eligibility Readm Mgmt   Discharge diagnosis Nephrolithiasis   Does the patient have one of the following disease processes/diagnoses(primary or secondary)? General Surgery   Does the patient have Home health ordered? No   Is there a DME ordered? No   Prep survey completed? Yes            Stephanie JULIO - Registered Nurse

## 2023-09-27 ENCOUNTER — READMISSION MANAGEMENT (OUTPATIENT)
Dept: CALL CENTER | Facility: HOSPITAL | Age: 73
End: 2023-09-27
Payer: OTHER GOVERNMENT

## 2023-09-27 ENCOUNTER — PREP FOR SURGERY (OUTPATIENT)
Dept: OTHER | Facility: HOSPITAL | Age: 73
End: 2023-09-27
Payer: OTHER GOVERNMENT

## 2023-09-27 DIAGNOSIS — N20.1 URETEROLITHIASIS: Primary | ICD-10-CM

## 2023-09-27 LAB
BACTERIA SPEC AEROBE CULT: NORMAL
BACTERIA SPEC AEROBE CULT: NORMAL

## 2023-09-27 RX ORDER — LEVOFLOXACIN 5 MG/ML
500 INJECTION, SOLUTION INTRAVENOUS ONCE
OUTPATIENT
Start: 2023-09-27 | End: 2023-09-27

## 2023-09-27 NOTE — OUTREACH NOTE
General Surgery Week 1 Survey      Flowsheet Row Responses   [facility] patient discharged from? [facility]   Does the patient have one of the following disease processes/diagnoses(primary or secondary)? General Surgery   Week 1 attempt successful? Yes   Call start time 1108   Call end time 1113   Meds reviewed with patient/caregiver? Yes   Is the patient having any side effects they believe may be caused by any medication additions or changes? No   Does the patient have all medications related to this admission filled (includes all antibiotics, pain medications, etc.) Yes   Is the patient taking all medications as directed (includes completed medication regime)? Yes   Does the patient have a follow up appointment scheduled with their surgeon? Yes   Has the patient kept scheduled appointments due by today? N/A   Has home health visited the patient within 72 hours of discharge? N/A   Psychosocial issues? No   Did the patient receive a copy of their discharge instructions? Yes   Nursing interventions Reviewed instructions with patient   What is the patient's perception of their health status since discharge? Improving   Nursing interventions Nurse provided patient education   Is the patient /caregiver able to teach back basic post-op care? Drive as instructed by MD in discharge instructions, Lifting as instructed by MD in discharge instructions   Is the patient/caregiver able to teach back steps to recovery at home? Set small, achievable goals for return to baseline health, Rest and rebuild strength, gradually increase activity, Practice good oral hygiene, Eat a well-balance diet   Is the patient/caregiver able to teach back the hierarchy of who to call/visit for symptoms/problems? PCP, Specialist, Home health nurse, Urgent Care, ED, 911 Yes   Additional teach back comments Reviewed s/s of UTI.   Week 1 call completed? Yes   Graduated Yes   Is the patient interested in additional calls from an ambulatory case  manager? No   Would this patient benefit from a Referral to [facility] Social Work? No   Wrap up additional comments States is slightly improved. Denies any s/s of UTI. States no problems with urination. Denies any needs/concerns.   Call end time 1113            Jennifer JAMES - Registered Nurse

## 2023-09-29 ENCOUNTER — TELEPHONE (OUTPATIENT)
Dept: UROLOGY | Facility: CLINIC | Age: 73
End: 2023-09-29
Payer: OTHER GOVERNMENT

## 2023-09-29 NOTE — TELEPHONE ENCOUNTER
SPOKE TO PT TO ADVISE OF OUR PLAN. NEED TO DO SURGERY, LABS PRIOR. PT STATES HE IS GOING ON VACATION AND CAN'T DO 10/16 OR 10/30. ADVISED WE WILL PLAN FOR LABS ON 11/3 AND SURGERY ON 11/10. PT IS AGREEABLE. [facility], AND HE WILL BRING US A COPY OF THE RESULTS. ADVISED PT THAT I WILL GET EVERYTHING ARRANGED AND SEND HIM INSTRUCTIONS IN THE MAIL. PT EXPRESSED UNDERSTANDING AND IS AGREEABLE

## 2023-10-03 DIAGNOSIS — N20.0 KIDNEY STONE: Primary | ICD-10-CM

## 2023-10-03 DIAGNOSIS — Z01.818 PRE-OP TESTING: ICD-10-CM

## 2023-11-03 ENCOUNTER — LAB (OUTPATIENT)
Dept: LAB | Facility: HOSPITAL | Age: 73
End: 2023-11-03
Payer: OTHER GOVERNMENT

## 2023-11-03 DIAGNOSIS — N20.0 KIDNEY STONE: ICD-10-CM

## 2023-11-03 DIAGNOSIS — Z01.818 PRE-OP TESTING: ICD-10-CM

## 2023-11-03 PROCEDURE — 87147 CULTURE TYPE IMMUNOLOGIC: CPT

## 2023-11-03 PROCEDURE — 87086 URINE CULTURE/COLONY COUNT: CPT

## 2023-11-04 LAB — BACTERIA SPEC AEROBE CULT: ABNORMAL

## 2023-11-06 ENCOUNTER — TELEPHONE (OUTPATIENT)
Dept: UROLOGY | Facility: CLINIC | Age: 73
End: 2023-11-06
Payer: OTHER GOVERNMENT

## 2023-11-06 RX ORDER — CHLORAL HYDRATE 500 MG
3000 CAPSULE ORAL
COMMUNITY

## 2023-11-06 NOTE — TELEPHONE ENCOUNTER
PATIENT CALLED FOR ARRIVAL TIME.  I CALLED SAME DAY AND WAS GIVEN 11:00 AM ARRIVAL TIME.  PATIENT AWARE.

## 2023-11-06 NOTE — TELEPHONE ENCOUNTER
SPOKE TO PT TO MOVE HIS PROCEDURE FORM 11/10 TO 11/7. PT IS AGREEABLE. ADVISED HIM THAT THE HOSPITAL WILL CALL HIM DIRECTLY WITH AN ARRIVAL TIME. PT EXPRESSED UNDERSTANDING AND IS AGREEABLE.

## 2023-11-07 ENCOUNTER — ANESTHESIA EVENT (OUTPATIENT)
Dept: PERIOP | Facility: HOSPITAL | Age: 73
End: 2023-11-07
Payer: OTHER GOVERNMENT

## 2023-11-07 ENCOUNTER — ANESTHESIA (OUTPATIENT)
Dept: PERIOP | Facility: HOSPITAL | Age: 73
End: 2023-11-07
Payer: OTHER GOVERNMENT

## 2023-11-07 ENCOUNTER — APPOINTMENT (OUTPATIENT)
Dept: GENERAL RADIOLOGY | Facility: HOSPITAL | Age: 73
DRG: 854 | End: 2023-11-07
Payer: OTHER GOVERNMENT

## 2023-11-07 ENCOUNTER — HOSPITAL ENCOUNTER (INPATIENT)
Facility: HOSPITAL | Age: 73
LOS: 6 days | Discharge: HOME | DRG: 854 | End: 2023-11-13
Attending: UROLOGY | Admitting: UROLOGY
Payer: OTHER GOVERNMENT

## 2023-11-07 DIAGNOSIS — N20.1 URETEROLITHIASIS: ICD-10-CM

## 2023-11-07 DIAGNOSIS — R26.2 DIFFICULTY WALKING: Primary | ICD-10-CM

## 2023-11-07 PROBLEM — A41.9 SEPSIS: Status: ACTIVE | Noted: 2023-11-07

## 2023-11-07 LAB
ANION GAP SERPL CALCULATED.3IONS-SCNC: 14.9 MMOL/L (ref 5–15)
ANION GAP SERPL CALCULATED.3IONS-SCNC: 9.4 MMOL/L (ref 5–15)
ARTERIAL PATENCY WRIST A: POSITIVE
BASE EXCESS BLDA CALC-SCNC: -5.4 MMOL/L (ref -2–2)
BASOPHILS # BLD AUTO: 0.01 10*3/MM3 (ref 0–0.2)
BASOPHILS NFR BLD AUTO: 0.3 % (ref 0–1.5)
BDY SITE: ABNORMAL
BUN SERPL-MCNC: 23 MG/DL (ref 8–23)
BUN SERPL-MCNC: 23 MG/DL (ref 8–23)
BUN/CREAT SERPL: 15.6 (ref 7–25)
BUN/CREAT SERPL: 17.4 (ref 7–25)
CA-I BLDA-SCNC: 1.14 MMOL/L (ref 1.13–1.32)
CALCIUM SPEC-SCNC: 9.5 MG/DL (ref 8.6–10.5)
CALCIUM SPEC-SCNC: 9.6 MG/DL (ref 8.6–10.5)
CHLORIDE BLDA-SCNC: 105 MMOL/L (ref 98–106)
CHLORIDE SERPL-SCNC: 100 MMOL/L (ref 98–107)
CHLORIDE SERPL-SCNC: 105 MMOL/L (ref 98–107)
CO2 SERPL-SCNC: 23.1 MMOL/L (ref 22–29)
CO2 SERPL-SCNC: 24.6 MMOL/L (ref 22–29)
COHGB MFR BLD: 0.6 % (ref 0–1.5)
CREAT SERPL-MCNC: 1.32 MG/DL (ref 0.76–1.27)
CREAT SERPL-MCNC: 1.47 MG/DL (ref 0.76–1.27)
DEPRECATED RDW RBC AUTO: 45.1 FL (ref 37–54)
EGFRCR SERPLBLD CKD-EPI 2021: 50.4 ML/MIN/1.73
EGFRCR SERPLBLD CKD-EPI 2021: 57.3 ML/MIN/1.73
EOSINOPHIL # BLD AUTO: 0.06 10*3/MM3 (ref 0–0.4)
EOSINOPHIL NFR BLD AUTO: 1.7 % (ref 0.3–6.2)
ERYTHROCYTE [DISTWIDTH] IN BLOOD BY AUTOMATED COUNT: 13.3 % (ref 12.3–15.4)
FHHB: 7 % (ref 0–5)
GAS FLOW AIRWAY: 2 LPM
GLUCOSE BLDA-MCNC: 226 MG/DL (ref 70–99)
GLUCOSE BLDC GLUCOMTR-MCNC: 207 MG/DL (ref 70–99)
GLUCOSE BLDC GLUCOMTR-MCNC: 228 MG/DL (ref 70–99)
GLUCOSE BLDC GLUCOMTR-MCNC: 272 MG/DL (ref 70–99)
GLUCOSE SERPL-MCNC: 208 MG/DL (ref 65–99)
GLUCOSE SERPL-MCNC: 254 MG/DL (ref 65–99)
HCO3 BLDA-SCNC: 17.8 MMOL/L (ref 22–26)
HCT VFR BLD AUTO: 54.5 % (ref 37.5–51)
HGB BLD-MCNC: 16.9 G/DL (ref 13–17.7)
HGB BLDA-MCNC: 16.3 G/DL (ref 13.8–16.4)
IMM GRANULOCYTES # BLD AUTO: 0.03 10*3/MM3 (ref 0–0.05)
IMM GRANULOCYTES NFR BLD AUTO: 0.8 % (ref 0–0.5)
INHALED O2 CONCENTRATION: 28 %
LACTATE BLDA-SCNC: 3.76 MMOL/L (ref 0.5–2)
LYMPHOCYTES # BLD AUTO: 1.09 10*3/MM3 (ref 0.7–3.1)
LYMPHOCYTES NFR BLD AUTO: 30.1 % (ref 19.6–45.3)
MAGNESIUM SERPL-MCNC: 1.6 MG/DL (ref 1.6–2.4)
MCH RBC QN AUTO: 28.4 PG (ref 26.6–33)
MCHC RBC AUTO-ENTMCNC: 31 G/DL (ref 31.5–35.7)
MCV RBC AUTO: 91.4 FL (ref 79–97)
METHGB BLD QL: 0.2 % (ref 0–1.5)
MODALITY: ABNORMAL
MONOCYTES # BLD AUTO: 0.02 10*3/MM3 (ref 0.1–0.9)
MONOCYTES NFR BLD AUTO: 0.6 % (ref 5–12)
NEUTROPHILS NFR BLD AUTO: 2.41 10*3/MM3 (ref 1.7–7)
NEUTROPHILS NFR BLD AUTO: 66.5 % (ref 42.7–76)
NOTE: ABNORMAL
NRBC BLD AUTO-RTO: 0 /100 WBC (ref 0–0.2)
NT-PROBNP SERPL-MCNC: <36 PG/ML (ref 0–900)
OXYHGB MFR BLDV: 92.2 % (ref 94–99)
PCO2 BLDA: 29.3 MM HG (ref 35–45)
PH BLDA: 7.4 PH UNITS (ref 7.35–7.45)
PLATELET # BLD AUTO: 205 10*3/MM3 (ref 140–450)
PMV BLD AUTO: 9.4 FL (ref 6–12)
PO2 BLD: 233 MM[HG] (ref 0–500)
PO2 BLDA: 65.3 MM HG (ref 80–100)
POTASSIUM BLDA-SCNC: 4.05 MMOL/L (ref 3.5–5)
POTASSIUM SERPL-SCNC: 4.2 MMOL/L (ref 3.5–5.2)
POTASSIUM SERPL-SCNC: 4.6 MMOL/L (ref 3.5–5.2)
QT INTERVAL: 414 MS
QTC INTERVAL: 471 MS
RBC # BLD AUTO: 5.96 10*6/MM3 (ref 4.14–5.8)
RBC MORPH BLD: NORMAL
SAO2 % BLDCOA: 92.9 % (ref 95–99)
SMALL PLATELETS BLD QL SMEAR: ADEQUATE
SODIUM BLDA-SCNC: 138.4 MMOL/L (ref 136–146)
SODIUM SERPL-SCNC: 138 MMOL/L (ref 136–145)
SODIUM SERPL-SCNC: 139 MMOL/L (ref 136–145)
WBC MORPH BLD: NORMAL
WBC NRBC COR # BLD: 3.62 10*3/MM3 (ref 3.4–10.8)

## 2023-11-07 PROCEDURE — 25010000002 PIPERACILLIN SOD-TAZOBACTAM PER 1 G: Performed by: INTERNAL MEDICINE

## 2023-11-07 PROCEDURE — 52356 CYSTO/URETERO W/LITHOTRIPSY: CPT | Performed by: UROLOGY

## 2023-11-07 PROCEDURE — 25010000002 MIDAZOLAM PER 1MG: Performed by: ANESTHESIOLOGY

## 2023-11-07 PROCEDURE — 83735 ASSAY OF MAGNESIUM: CPT | Performed by: INTERNAL MEDICINE

## 2023-11-07 PROCEDURE — C1758 CATHETER, URETERAL: HCPCS | Performed by: UROLOGY

## 2023-11-07 PROCEDURE — C2617 STENT, NON-COR, TEM W/O DEL: HCPCS | Performed by: UROLOGY

## 2023-11-07 PROCEDURE — 71045 X-RAY EXAM CHEST 1 VIEW: CPT

## 2023-11-07 PROCEDURE — 25810000003 LACTATED RINGERS PER 1000 ML: Performed by: ANESTHESIOLOGY

## 2023-11-07 PROCEDURE — 80048 BASIC METABOLIC PNL TOTAL CA: CPT | Performed by: ANESTHESIOLOGY

## 2023-11-07 PROCEDURE — 94799 UNLISTED PULMONARY SVC/PX: CPT

## 2023-11-07 PROCEDURE — 0TP98DZ REMOVAL OF INTRALUMINAL DEVICE FROM URETER, VIA NATURAL OR ARTIFICIAL OPENING ENDOSCOPIC: ICD-10-PCS | Performed by: UROLOGY

## 2023-11-07 PROCEDURE — 25010000002 FENTANYL CITRATE (PF) 50 MCG/ML SOLUTION: Performed by: NURSE ANESTHETIST, CERTIFIED REGISTERED

## 2023-11-07 PROCEDURE — 25010000002 PROPOFOL 10 MG/ML EMULSION: Performed by: NURSE ANESTHETIST, CERTIFIED REGISTERED

## 2023-11-07 PROCEDURE — 93005 ELECTROCARDIOGRAM TRACING: CPT | Performed by: ANESTHESIOLOGY

## 2023-11-07 PROCEDURE — C1894 INTRO/SHEATH, NON-LASER: HCPCS | Performed by: UROLOGY

## 2023-11-07 PROCEDURE — 93005 ELECTROCARDIOGRAM TRACING: CPT | Performed by: UROLOGY

## 2023-11-07 PROCEDURE — 25010000002 LEVOFLOXACIN PER 250 MG: Performed by: UROLOGY

## 2023-11-07 PROCEDURE — 63710000001 INSULIN LISPRO (HUMAN) PER 5 UNITS: Performed by: INTERNAL MEDICINE

## 2023-11-07 PROCEDURE — 25510000001 IOPAMIDOL PER 1 ML: Performed by: UROLOGY

## 2023-11-07 PROCEDURE — 83050 HGB METHEMOGLOBIN QUAN: CPT | Performed by: ANESTHESIOLOGY

## 2023-11-07 PROCEDURE — 85025 COMPLETE CBC W/AUTO DIFF WBC: CPT | Performed by: ANESTHESIOLOGY

## 2023-11-07 PROCEDURE — 85007 BL SMEAR W/DIFF WBC COUNT: CPT | Performed by: ANESTHESIOLOGY

## 2023-11-07 PROCEDURE — 82948 REAGENT STRIP/BLOOD GLUCOSE: CPT

## 2023-11-07 PROCEDURE — 25810000003 SODIUM CHLORIDE 0.9 % SOLUTION: Performed by: INTERNAL MEDICINE

## 2023-11-07 PROCEDURE — 93005 ELECTROCARDIOGRAM TRACING: CPT | Performed by: INTERNAL MEDICINE

## 2023-11-07 PROCEDURE — C1769 GUIDE WIRE: HCPCS | Performed by: UROLOGY

## 2023-11-07 PROCEDURE — 88300 SURGICAL PATH GROSS: CPT | Performed by: UROLOGY

## 2023-11-07 PROCEDURE — 93010 ELECTROCARDIOGRAM REPORT: CPT | Performed by: INTERNAL MEDICINE

## 2023-11-07 PROCEDURE — 82365 CALCULUS SPECTROSCOPY: CPT | Performed by: UROLOGY

## 2023-11-07 PROCEDURE — 25810000003 LACTATED RINGERS SOLUTION: Performed by: INTERNAL MEDICINE

## 2023-11-07 PROCEDURE — 25010000002 VANCOMYCIN 5 G RECONSTITUTED SOLUTION: Performed by: INTERNAL MEDICINE

## 2023-11-07 PROCEDURE — 25010000002 ONDANSETRON PER 1 MG: Performed by: NURSE ANESTHETIST, CERTIFIED REGISTERED

## 2023-11-07 PROCEDURE — 0T778DZ DILATION OF LEFT URETER WITH INTRALUMINAL DEVICE, VIA NATURAL OR ARTIFICIAL OPENING ENDOSCOPIC: ICD-10-PCS | Performed by: UROLOGY

## 2023-11-07 PROCEDURE — BT1F1ZZ FLUOROSCOPY OF LEFT KIDNEY, URETER AND BLADDER USING LOW OSMOLAR CONTRAST: ICD-10-PCS | Performed by: UROLOGY

## 2023-11-07 PROCEDURE — 0TC78ZZ EXTIRPATION OF MATTER FROM LEFT URETER, VIA NATURAL OR ARTIFICIAL OPENING ENDOSCOPIC: ICD-10-PCS | Performed by: UROLOGY

## 2023-11-07 PROCEDURE — 80048 BASIC METABOLIC PNL TOTAL CA: CPT | Performed by: UROLOGY

## 2023-11-07 PROCEDURE — 83880 ASSAY OF NATRIURETIC PEPTIDE: CPT | Performed by: INTERNAL MEDICINE

## 2023-11-07 PROCEDURE — 74420 UROGRAPHY RTRGR +-KUB: CPT | Performed by: UROLOGY

## 2023-11-07 PROCEDURE — 82805 BLOOD GASES W/O2 SATURATION: CPT | Performed by: ANESTHESIOLOGY

## 2023-11-07 PROCEDURE — 82375 ASSAY CARBOXYHB QUANT: CPT | Performed by: ANESTHESIOLOGY

## 2023-11-07 PROCEDURE — 25010000002 MEPERIDINE PER 100 MG: Performed by: NURSE ANESTHETIST, CERTIFIED REGISTERED

## 2023-11-07 PROCEDURE — 76000 FLUOROSCOPY <1 HR PHYS/QHP: CPT

## 2023-11-07 PROCEDURE — 36600 WITHDRAWAL OF ARTERIAL BLOOD: CPT | Performed by: ANESTHESIOLOGY

## 2023-11-07 PROCEDURE — 93010 ELECTROCARDIOGRAM REPORT: CPT | Performed by: SPECIALIST

## 2023-11-07 DEVICE — URETERAL STENT
Type: IMPLANTABLE DEVICE | Site: URETER | Status: FUNCTIONAL
Brand: ASCERTA™

## 2023-11-07 RX ORDER — PROPOFOL 10 MG/ML
VIAL (ML) INTRAVENOUS AS NEEDED
Status: DISCONTINUED | OUTPATIENT
Start: 2023-11-07 | End: 2023-11-07 | Stop reason: SURG

## 2023-11-07 RX ORDER — PROMETHAZINE HYDROCHLORIDE 12.5 MG/1
12.5 TABLET ORAL ONCE AS NEEDED
Status: DISCONTINUED | OUTPATIENT
Start: 2023-11-07 | End: 2023-11-13 | Stop reason: HOSPADM

## 2023-11-07 RX ORDER — PHENYLEPHRINE HCL IN 0.9% NACL 1 MG/10 ML
SYRINGE (ML) INTRAVENOUS AS NEEDED
Status: DISCONTINUED | OUTPATIENT
Start: 2023-11-07 | End: 2023-11-07 | Stop reason: SURG

## 2023-11-07 RX ORDER — ASPIRIN 325 MG
325 TABLET ORAL 2 TIMES DAILY
Start: 2023-11-09

## 2023-11-07 RX ORDER — SUCCINYLCHOLINE/SOD CL,ISO/PF 100 MG/5ML
SYRINGE (ML) INTRAVENOUS AS NEEDED
Status: DISCONTINUED | OUTPATIENT
Start: 2023-11-07 | End: 2023-11-07 | Stop reason: SURG

## 2023-11-07 RX ORDER — MAGNESIUM HYDROXIDE 1200 MG/15ML
LIQUID ORAL AS NEEDED
Status: DISCONTINUED | OUTPATIENT
Start: 2023-11-07 | End: 2023-11-07 | Stop reason: HOSPADM

## 2023-11-07 RX ORDER — ONDANSETRON 2 MG/ML
4 INJECTION INTRAMUSCULAR; INTRAVENOUS ONCE AS NEEDED
Status: DISCONTINUED | OUTPATIENT
Start: 2023-11-07 | End: 2023-11-07

## 2023-11-07 RX ORDER — SODIUM CHLORIDE 0.9 % (FLUSH) 0.9 %
10 SYRINGE (ML) INJECTION EVERY 12 HOURS SCHEDULED
Status: DISCONTINUED | OUTPATIENT
Start: 2023-11-07 | End: 2023-11-13 | Stop reason: HOSPADM

## 2023-11-07 RX ORDER — OXYCODONE HYDROCHLORIDE AND ACETAMINOPHEN 5; 325 MG/1; MG/1
1-2 TABLET ORAL EVERY 6 HOURS PRN
Qty: 14 TABLET | Refills: 0 | Status: SHIPPED | OUTPATIENT
Start: 2023-11-07

## 2023-11-07 RX ORDER — SODIUM CHLORIDE, SODIUM LACTATE, POTASSIUM CHLORIDE, CALCIUM CHLORIDE 600; 310; 30; 20 MG/100ML; MG/100ML; MG/100ML; MG/100ML
9 INJECTION, SOLUTION INTRAVENOUS CONTINUOUS PRN
Status: DISCONTINUED | OUTPATIENT
Start: 2023-11-07 | End: 2023-11-13 | Stop reason: HOSPADM

## 2023-11-07 RX ORDER — ENOXAPARIN SODIUM 100 MG/ML
40 INJECTION SUBCUTANEOUS DAILY
Status: DISCONTINUED | OUTPATIENT
Start: 2023-11-07 | End: 2023-11-07

## 2023-11-07 RX ORDER — SODIUM CHLORIDE 0.9 % (FLUSH) 0.9 %
10 SYRINGE (ML) INJECTION AS NEEDED
Status: DISCONTINUED | OUTPATIENT
Start: 2023-11-07 | End: 2023-11-13 | Stop reason: HOSPADM

## 2023-11-07 RX ORDER — ONDANSETRON 2 MG/ML
4 INJECTION INTRAMUSCULAR; INTRAVENOUS ONCE AS NEEDED
Status: DISCONTINUED | OUTPATIENT
Start: 2023-11-07 | End: 2023-11-08

## 2023-11-07 RX ORDER — MEPERIDINE HYDROCHLORIDE 25 MG/ML
12.5 INJECTION INTRAMUSCULAR; INTRAVENOUS; SUBCUTANEOUS
Status: DISCONTINUED | OUTPATIENT
Start: 2023-11-07 | End: 2023-11-07 | Stop reason: HOSPADM

## 2023-11-07 RX ORDER — OXYCODONE HYDROCHLORIDE 5 MG/1
5 TABLET ORAL
Status: DISCONTINUED | OUTPATIENT
Start: 2023-11-07 | End: 2023-11-13 | Stop reason: HOSPADM

## 2023-11-07 RX ORDER — MIDAZOLAM HYDROCHLORIDE 2 MG/2ML
2 INJECTION, SOLUTION INTRAMUSCULAR; INTRAVENOUS ONCE
Status: COMPLETED | OUTPATIENT
Start: 2023-11-07 | End: 2023-11-07

## 2023-11-07 RX ORDER — INSULIN LISPRO 100 [IU]/ML
2-7 INJECTION, SOLUTION INTRAVENOUS; SUBCUTANEOUS
Status: DISCONTINUED | OUTPATIENT
Start: 2023-11-07 | End: 2023-11-13 | Stop reason: HOSPADM

## 2023-11-07 RX ORDER — IBUPROFEN 600 MG/1
600 TABLET ORAL EVERY 6 HOURS PRN
Status: DISCONTINUED | OUTPATIENT
Start: 2023-11-07 | End: 2023-11-07

## 2023-11-07 RX ORDER — ACETAMINOPHEN 325 MG/1
650 TABLET ORAL ONCE
Status: DISCONTINUED | OUTPATIENT
Start: 2023-11-07 | End: 2023-11-13 | Stop reason: HOSPADM

## 2023-11-07 RX ORDER — LIDOCAINE HYDROCHLORIDE 20 MG/ML
INJECTION, SOLUTION EPIDURAL; INFILTRATION; INTRACAUDAL; PERINEURAL AS NEEDED
Status: DISCONTINUED | OUTPATIENT
Start: 2023-11-07 | End: 2023-11-07 | Stop reason: SURG

## 2023-11-07 RX ORDER — VANCOMYCIN/0.9 % SOD CHLORIDE 1.5G/250ML
1500 PLASTIC BAG, INJECTION (ML) INTRAVENOUS EVERY 24 HOURS
Status: DISCONTINUED | OUTPATIENT
Start: 2023-11-08 | End: 2023-11-08

## 2023-11-07 RX ORDER — ACETAMINOPHEN 325 MG/1
650 TABLET ORAL EVERY 4 HOURS PRN
Status: DISCONTINUED | OUTPATIENT
Start: 2023-11-07 | End: 2023-11-13 | Stop reason: HOSPADM

## 2023-11-07 RX ORDER — LEVOFLOXACIN 5 MG/ML
500 INJECTION, SOLUTION INTRAVENOUS ONCE
Status: COMPLETED | OUTPATIENT
Start: 2023-11-07 | End: 2023-11-07

## 2023-11-07 RX ORDER — PROMETHAZINE HYDROCHLORIDE 25 MG/1
25 TABLET ORAL ONCE AS NEEDED
Status: DISCONTINUED | OUTPATIENT
Start: 2023-11-07 | End: 2023-11-13 | Stop reason: HOSPADM

## 2023-11-07 RX ORDER — ROCURONIUM BROMIDE 10 MG/ML
INJECTION, SOLUTION INTRAVENOUS AS NEEDED
Status: DISCONTINUED | OUTPATIENT
Start: 2023-11-07 | End: 2023-11-07 | Stop reason: SURG

## 2023-11-07 RX ORDER — DEXTROSE MONOHYDRATE 25 G/50ML
25 INJECTION, SOLUTION INTRAVENOUS
Status: DISCONTINUED | OUTPATIENT
Start: 2023-11-07 | End: 2023-11-13 | Stop reason: HOSPADM

## 2023-11-07 RX ORDER — PHENAZOPYRIDINE HYDROCHLORIDE 200 MG/1
200 TABLET, FILM COATED ORAL 3 TIMES DAILY PRN
Qty: 20 TABLET | Refills: 0 | Status: SHIPPED | OUTPATIENT
Start: 2023-11-07

## 2023-11-07 RX ORDER — IBUPROFEN 600 MG/1
1 TABLET ORAL
Status: DISCONTINUED | OUTPATIENT
Start: 2023-11-07 | End: 2023-11-13 | Stop reason: HOSPADM

## 2023-11-07 RX ORDER — ONDANSETRON 2 MG/ML
4 INJECTION INTRAMUSCULAR; INTRAVENOUS EVERY 6 HOURS PRN
Status: DISCONTINUED | OUTPATIENT
Start: 2023-11-07 | End: 2023-11-13 | Stop reason: HOSPADM

## 2023-11-07 RX ORDER — OXYBUTYNIN CHLORIDE 5 MG/1
5 TABLET ORAL 3 TIMES DAILY PRN
Qty: 12 TABLET | Refills: 0 | Status: SHIPPED | OUTPATIENT
Start: 2023-11-07

## 2023-11-07 RX ORDER — NICOTINE POLACRILEX 4 MG
15 LOZENGE BUCCAL
Status: DISCONTINUED | OUTPATIENT
Start: 2023-11-07 | End: 2023-11-13 | Stop reason: HOSPADM

## 2023-11-07 RX ORDER — PROMETHAZINE HYDROCHLORIDE 25 MG/1
25 SUPPOSITORY RECTAL ONCE AS NEEDED
Status: DISCONTINUED | OUTPATIENT
Start: 2023-11-07 | End: 2023-11-13 | Stop reason: HOSPADM

## 2023-11-07 RX ORDER — ONDANSETRON 4 MG/1
4 TABLET, FILM COATED ORAL ONCE AS NEEDED
Status: DISCONTINUED | OUTPATIENT
Start: 2023-11-07 | End: 2023-11-08

## 2023-11-07 RX ORDER — ONDANSETRON 2 MG/ML
INJECTION INTRAMUSCULAR; INTRAVENOUS AS NEEDED
Status: DISCONTINUED | OUTPATIENT
Start: 2023-11-07 | End: 2023-11-07 | Stop reason: SURG

## 2023-11-07 RX ORDER — FENTANYL CITRATE 50 UG/ML
INJECTION, SOLUTION INTRAMUSCULAR; INTRAVENOUS AS NEEDED
Status: DISCONTINUED | OUTPATIENT
Start: 2023-11-07 | End: 2023-11-07 | Stop reason: SURG

## 2023-11-07 RX ORDER — ACETAMINOPHEN 500 MG
1000 TABLET ORAL ONCE
Status: COMPLETED | OUTPATIENT
Start: 2023-11-07 | End: 2023-11-07

## 2023-11-07 RX ORDER — SODIUM CHLORIDE 9 MG/ML
40 INJECTION, SOLUTION INTRAVENOUS AS NEEDED
Status: DISCONTINUED | OUTPATIENT
Start: 2023-11-07 | End: 2023-11-13 | Stop reason: HOSPADM

## 2023-11-07 RX ADMIN — PROPOFOL 50 MG: 10 INJECTION, EMULSION INTRAVENOUS at 13:57

## 2023-11-07 RX ADMIN — Medication 100 MCG: at 14:12

## 2023-11-07 RX ADMIN — PIPERACILLIN AND TAZOBACTAM 4.5 G: 4; .5 INJECTION, POWDER, FOR SOLUTION INTRAVENOUS at 18:33

## 2023-11-07 RX ADMIN — Medication 200 MG: at 13:18

## 2023-11-07 RX ADMIN — ACETAMINOPHEN 650 MG: 325 TABLET ORAL at 20:15

## 2023-11-07 RX ADMIN — SODIUM CHLORIDE, POTASSIUM CHLORIDE, SODIUM LACTATE AND CALCIUM CHLORIDE 1000 ML: 600; 310; 30; 20 INJECTION, SOLUTION INTRAVENOUS at 18:47

## 2023-11-07 RX ADMIN — LIDOCAINE HYDROCHLORIDE 100 MG: 20 INJECTION, SOLUTION EPIDURAL; INFILTRATION; INTRACAUDAL; PERINEURAL at 13:18

## 2023-11-07 RX ADMIN — PROPOFOL 50 MG: 10 INJECTION, EMULSION INTRAVENOUS at 13:48

## 2023-11-07 RX ADMIN — ACETAMINOPHEN 1000 MG: 500 TABLET ORAL at 10:54

## 2023-11-07 RX ADMIN — Medication 100 MCG: at 14:08

## 2023-11-07 RX ADMIN — LEVOFLOXACIN 500 MG: 5 INJECTION, SOLUTION INTRAVENOUS at 13:16

## 2023-11-07 RX ADMIN — MIDAZOLAM HYDROCHLORIDE 2 MG: 1 INJECTION, SOLUTION INTRAMUSCULAR; INTRAVENOUS at 13:07

## 2023-11-07 RX ADMIN — ROCURONIUM BROMIDE 10 MG: 50 INJECTION INTRAVENOUS at 13:18

## 2023-11-07 RX ADMIN — SODIUM CHLORIDE, POTASSIUM CHLORIDE, SODIUM LACTATE AND CALCIUM CHLORIDE 9 ML/HR: 600; 310; 30; 20 INJECTION, SOLUTION INTRAVENOUS at 10:56

## 2023-11-07 RX ADMIN — PROPOFOL 200 MG: 10 INJECTION, EMULSION INTRAVENOUS at 13:18

## 2023-11-07 RX ADMIN — OXYCODONE HYDROCHLORIDE 5 MG: 5 TABLET ORAL at 15:24

## 2023-11-07 RX ADMIN — PROPOFOL 100 MG: 10 INJECTION, EMULSION INTRAVENOUS at 13:19

## 2023-11-07 RX ADMIN — FENTANYL CITRATE 50 MCG: 50 INJECTION, SOLUTION INTRAMUSCULAR; INTRAVENOUS at 13:19

## 2023-11-07 RX ADMIN — VANCOMYCIN HYDROCHLORIDE 3000 MG: 500 INJECTION, POWDER, LYOPHILIZED, FOR SOLUTION INTRAVENOUS at 19:24

## 2023-11-07 RX ADMIN — MEPERIDINE HYDROCHLORIDE 12.5 MG: 25 INJECTION INTRAMUSCULAR; INTRAVENOUS; SUBCUTANEOUS at 15:28

## 2023-11-07 RX ADMIN — ONDANSETRON 4 MG: 2 INJECTION INTRAMUSCULAR; INTRAVENOUS at 13:56

## 2023-11-07 RX ADMIN — FENTANYL CITRATE 50 MCG: 50 INJECTION, SOLUTION INTRAMUSCULAR; INTRAVENOUS at 13:48

## 2023-11-07 RX ADMIN — INSULIN LISPRO 4 UNITS: 100 INJECTION, SOLUTION INTRAVENOUS; SUBCUTANEOUS at 21:07

## 2023-11-07 NOTE — ANESTHESIA PREPROCEDURE EVALUATION
Anesthesia Evaluation     Patient summary reviewed and Nursing notes reviewed   history of anesthetic complications:  prolonged sedation  NPO Solid Status: > 8 hours  NPO Liquid Status: > 2 hours           Airway   Mallampati: III  TM distance: >3 FB  Neck ROM: full  Possible difficult intubation  Dental          Pulmonary - normal exam    breath sounds clear to auscultation  (+) COPD,sleep apnea  Cardiovascular - normal exam    ECG reviewed  Rhythm: regular  Rate: normal    (+) hypertension      Neuro/Psych  (+) CVA residual symptoms  GI/Hepatic/Renal/Endo    (+) GERD well controlled, renal disease- CRI, diabetes mellitus    Musculoskeletal (-) negative ROS    Abdominal    Substance History - negative use     OB/GYN negative ob/gyn ROS         Other - negative ROS       ROS/Med Hx Other: <4METS, HX LT AKA,COPD,CD, MI 2016,DM,RBBB. HERE 9/22/23 [email or web address]                     Anesthesia Plan    ASA 4     general     (Pt was intubated without diff recently)    Anesthetic plan, risks, benefits, and alternatives have been provided, discussed and informed consent has been obtained with: patient and spouse/significant other.        CODE STATUS:

## 2023-11-07 NOTE — H&P
[facility]  Date: [facility]   Patient Name: Tate Kelley  : 1950  MRN: [id number]  Primary Care Physician:  Cassie Osuna PA  Date of admission: 2023    Subjective   Subjective     Chief Complaint: Left ureteral stone    HPI:    Tate Kelley is a 73 y.o. male past medical history of obesity with BMI of 45, nephrolithiasis, type 2 diabetes, BPH, and hypertension who initially came in for cystoscopy with stent exchange and same-day surgery who developed fever and tachycardia so was taken to the PACU    History is obtained from the patient and the urologist.  Patient has been in and out of the OR multiple times for stent removals.  Today he seemed to be relatively well postoperatively.  However he developed fever, tachycardia, and rigors.  At this point he was moved to the PACU for further management and close monitoring.  Urology requested admission from internal medicine due to concern for possible sepsis.    In the PACU the patient's vital signs were as follows: Temperature was 99.5, pulse 150, respiratory rate was in the 20s to 30s, blood pressure 152/69, satting well on facemask.  CBC shows no significant abnormalities.  CMP shows a mild bump in his creatinine from 1.3->1.4.  ABG is 7.4/29/65.3 with a lactate of 3.7.  Initially, I was told his temperature was 105 and I was concern for NMS but upon seeing the patient clinically has temperature was actually 99.  He was not given dantrolene because there was no concern for NMS at that time although it was considered.  I asked the PACU to give the patient 2 L of lactated Ringer's.  We will dose with Zosyn and vancomycin.  Although initially the patient seem to be in sinus tachycardia on the monitor he was appearing to be in SVT which will be addressed once he gets to the ICU.  Patient will be admitted to the ICU due to sepsis from urinary source.    All systems reviewed abnormalities  noted above    Personal History     Past Medical History:  Patient has BMI 45  Type 2 diabetes  Hypertension  BPH  COPD  Coronary artery disease  GERD  Dyslipidemia  Stroke    Past Surgical History:  Abdominal surgery  Cystoscopy and ureteroscopy  Endoscopy  Kidney stone surgery  Knee surgery  Mouth surgery  Amputated left leg    Family History:   Coronary artery disease and hypertension    Social History:   Never smoked.  Never alcohol.    Home Medications:  aspirin, fish oil, glipizide, glucose, lisinopril, metFORMIN ER, oxyCODONE-acetaminophen, oxybutynin, phenazopyridine, tamsulosin, and vitamin D    Allergies:  Allergies   Allergen Reactions    Latex Rash    Saccharin Nausea And Vomiting    Tuna Oil [Fish Oil] Nausea And Vomiting         Objective   Objective     Vitals:   Temp:  [97 °F (36.1 °C)-99.7 °F (37.6 °C)] 99.7 °F (37.6 °C)  Heart Rate:  [] 154  Resp:  [20-30] 30  BP: (137-188)/() 181/73  Flow (L/min):  [2-4] 2    Physical Exam    Constitutional: Mild distress.   Eyes: Pupils equal, sclerae anicteric, no conjunctival injection   HENT: NCAT, mucous membranes moist   Neck: Supple, no thyromegaly, no lymphadenopathy, trachea midline   Respiratory: Clear to auscultation bilaterally, nonlabored respirations    Cardiovascular: RRR, no murmurs, rubs, or gallops, palpable pedal pulses bilaterally   Gastrointestinal: Positive bowel sounds, soft, nontender, nondistended   Musculoskeletal: Leg shows trace to 1+ pitting edema.  Left leg is amputated   Psychiatric: Appropriate affect, cooperative   Neurologic: Oriented x 3, strength symmetric in all extremities, Cranial Nerves grossly intact to confrontation, speech clear   Skin: No rashes     Result Review    Result Review:  I have personally reviewed the results from the time of this admission to 11/7/2023 17:42 EST and agree with these findings:  BNP is less than 36  Urine culture from 11/3 wrist coag negative staph  EKG shows sinus  tachycardia    Chest x-ray shows low lung volumes may be some pulmonary congestion      Assessment & Plan   Assessment / Plan     Assessment/Plan:   Sepsis due to a urinary source  Urinary tract infection with recent manipulation  Recent urine culture positive for coag negative staph on 11/3  CKD stage IIIa with baseline creatinine between 1.4 and 1.6  Sinus tachycardia  Hematuria  Type 2 diabetes  Hypertension  BPH  Coronary artery disease  CVA 2016    Plan:  -- Admit to hospital service  -- Consult critical care  -- Consult urology  -- Patient received 2.5 L of LR with improvement his heart rate from 150 down into the 130s  --Tylenol for fevers  -- Vancomycin and Zosyn due to recent instrumentation tailor based on culture data  --Blood cultures  --Send urinalysis and urine culture  -- EKG shows sinus tachycardia not SVT  -- Abebe catheter in place due to hematuria  --H&H every 6 hours  --We will discuss with urology this evening because the Abebe catheter is getting clots and concern for we will clot so most likely require CBI  -- Sliding scale for type 2 diabetes        DVT prophylaxis:  SCDs due to hematuria    CODE STATUS:     Full code    Admission Status:  I believe this patient meets admission status.    Electronically signed by Mohamud Shipman MD, 11/07/23, 5:42 PM EST.

## 2023-11-07 NOTE — H&P
"  [facility]   Urology Preop H&P Note    Patient Name: Tate Kelley  : 1950  MRN: [id number]  Primary Care Physician:  Cassie Osuna PA  Referring Physician: Cassie Alexandra*  Date of admission: 2023    Subjective   Subjective     Reason for Consult/ Chief Complaint: Ureterolithiasis [N20.1]    HPI:  Tate Kelley is a 73 y.o. male history ofUreterolithiasis [N20.1] who presents for further management OR.  Presents for planned Procedure(s):  CYSTOSCOPY URETEROSCOPY RETROGRADE PYELOGRAM HOLMIUM LASER STENT EXCHANGE  left;  .  Procedure to be preformed on the left.  Risk, benefits, and alternatives discussed with patient prior to today.All questions were addressed after providing time for discussion.  Patient denies significant changes since last visit.  No new complaints today.    Review of Systems   All systems were reviewed and negative except for the above  Personal History     Past Medical History:   Diagnosis Date    Anesthesia     \"TROUBLE COMING OUT OF ANESTHESIA\" PT STATES HE HAS TROUBLE BREATHING    Arthritis     NECK SPINE    COPD (chronic obstructive pulmonary disease)     NO MEDS/INHALERS, SOA E    Coronary artery disease     NO STENTS/BYPASSES DONE    DDD (degenerative disc disease), cervical     DM2 (diabetes mellitus, type 2)     ORAL MEDS/CHECKS BS, AVE     Flesh-eating bacteria     L LEG LED TO AMPUTATION NO CURRENT ISSUES    GERD (gastroesophageal reflux disease)     Heart attack     MI 2016, CLEARED BY CARDIOLOGY, PCP (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, WEAK L ARM    Tachycardia     NO MEDS CURRENTLY ASYMPTOMATIC, DENIES CP, SOAE    Uric acid kidney stone 2019       Past Surgical History:   Procedure Laterality Date    ABDOMINAL SURGERY      UMBILICAL HERNIA REPAIR     AMPUTATION      LBN    COLONOSCOPY      CYSTOSCOPY " URETEROSCOPY Left 06/28/2021    Procedure: CYSTOSCOPY URETEROSCOPY RETROGRADE PYELOGRAM STENT EXCHANGE LEFT;  Surgeon: Sarah Palacios MD;  Location: [facility] MAIN OR;  Service: Urology;  Laterality: Left;    CYSTOSCOPY W/ URETERAL STENT PLACEMENT Left 09/22/2023    Procedure: CYSTOSCOPY URETERAL CATHETER/STENT INSERTION;  Surgeon: Brayden Medrano MD;  Location: [facility] MAIN OR;  Service: Urology;  Laterality: Left;    ENDOSCOPY      FOOT SURGERY Right     CARTLEGE REPAIR    KIDNEY STONE SURGERY      STENT INPLACED    KNEE SURGERY      ARTHROSCOPE    MOUTH SURGERY      TEETH EXTRACTED       Family History: family history includes Heart attack in his father; Hypertension in his mother; Leukemia in his mother; Prostate cancer in his father. Otherwise pertinent FHx was reviewed and not pertinent to current issue.    Social History:  reports that he has never smoked. He has never used smokeless tobacco. He reports that he does not drink alcohol and does not use drugs.    Home Medications:  aspirin, fish oil, glipizide, glucose, lisinopril, metFORMIN ER, tamsulosin, and vitamin D    Allergies:  Allergies   Allergen Reactions    Latex Rash    Saccharin Nausea And Vomiting    Tuna Oil [Fish Oil] Nausea And Vomiting       Objective    Objective     Vitals:   Temp:  [98 °F (36.7 °C)] 98 °F (36.7 °C)  Heart Rate:  [80] 80  Resp:  [20] 20  BP: (139)/(78) 139/78    Physical Exam:   Constitutional: Awake, alert   Respiratory: Clear, nonlabored respirations    Cardiovascular: Regular rate, no chest retractions   gastrointestinal: Appears soft, nontender     Results:    Assessment & Plan   Assessment / Plan     Brief Patient Summary:  Tate Kelley is a 73 y.o. male who     Active Hospital Problems:  Active Hospital Problems    Diagnosis     **Ureterolithiasis        Plan:   Proceed to the OR for planned procedure, Procedure(s):  CYSTOSCOPY URETEROSCOPY RETROGRADE PYELOGRAM HOLMIUM LASER STENT EXCHANGE  left,    Risk,  benefits, and alternatives discussed with patient at length he is agreeable to proceed  Laterality identified, left  All questions addressed      Electronically signed by Sarah Palacios MD, 11/07/23, 12:45 PM EST.

## 2023-11-07 NOTE — DISCHARGE INSTRUCTIONS
DISCHARGE INSTRUCTIONS  Extracorporeal Shock Wave Lithotripsy (ESWL)/ Ureteroscopy Lasertripsy      For your surgery you had:  General anesthesia (you may have a sore throat for the first 24 hours).  You may experience dizziness, drowsiness, or lightheadedness for several hours following surgery.  Do not stay alone today or tonight.  Limit your activity for 24 hours.  You should not drive or operate machinery, drink alcohol, or sign legally binding documents for 24 hours or while you are taking pain medication.  Resume your diet slowly.  Follow any special dietary instructions you may have been given by your doctor.      NOTIFY YOUR DOCTOR IF YOU EXPERIENCE ANY OF THE FOLLOWING:  Temperature greater than 101 degrees Fahrenheit  Shaking Chills  Redness or excessive drainage from incision  Nausea, vomiting and/or pain that is not controlled by prescribed medications  Increase in bleeding or bleeding that is excessive  Unable to urinate in 6 hours after surgery  If unable to reach your doctor, please go to the closest Emergency Room  Strain urine if instructed by physician.  Collect any fragments and take with you on your scheduled appointment. You may pass stone pieces or small blood clots.  Blood in your urine is normal.  It could be light pink to cherry color.  Drink 6-8 glasses of fluid each day to assist with passing of stone fragments.  Back pain is common.  It may feel like a dull ache or back spasm.  Urine will be bloody for several days.  Slight redness or bruising may be noticed on treated side.  If you have difficulty urinating, try sitting in a bathtub of warm water.    If you have a stent, it must be managed by your urologist.  Do NOT forget.      SPECIAL INSTRUCTIONS:  See Dr. Palacios's instructions on After Visit Summary.      Last dose of pain medication was given at:  TYLENOL 1000 MG AT 10:54 AM.

## 2023-11-07 NOTE — OP NOTE
Pre-Operative Diagnosis:      Left ureteral stone            Post-Operative Diagnosis:      Same as pre-op diagnosis            Surgeon/Assistants      Surgeon:  Sarah Palacios            Anesthesia      General            Procedure Performed/Technique      Cystoscopy, left retrograde pyelogram, ureteroscopy, laser lithotripsy and stent exchange        Specimen/Tissue Removed:          Left ureteral stone            Findings:         Enlarged prostate; cystoscopy limited due to body habitus;bilateral orthotopic      ureters; mild left hydronephrosis; ureteral stone treated with laser lithotripsy, fragments retrieved, some sediment and dust remaining;   large amount of      intrarenal sediment and dust and upper, mid, and lower poles; no discrete intrarenal stones appreciated; exchange 6 x 26 stent no string        Complications:      No            Estimated Blood Loss:      None            Procedure:      After informed consent was obtained, the patient was taken back to the      operating suite where general anesthesia was administered. Once the patient      was adequately anesthetized, he was placed in the dorsal lithotomy position.      His genitals were prepped and draped in a normal sterile fashion.  Rigid      cystoscope was inserted gently in the patient's urethra meatus, which passed      atraumatically into the bladder.  Patient noted to have enlarged prostate with     coapting lateral lobes making cystoscope insertion challenging and     visualization limited.  The previously placed left ureteral stent was easily     visible.  It was grasped and retrieved through the urethral meatus.  Sensor wire    was threaded through it into the renal pelvis and the stent reduced.  Dual-lumen    catheter was then placed over the wire and retrograde      pyelogram was performed revealing mild hydronephrosis.        Second wire was then placed after retrograde pyelogram, the dual-lumen reduced.     One of the wires was  secured to the drape as a safety wire for the remainder of     the case.  The      access sheath was passed to the proximal ureter.  The     ureteroscope was then assembled and ureteroscopy      proceeded in a systematic manner, the stone was encountered in the proximal ureter.  It is too large to basket.  Laser lithotripsy was initiated to fragment the stone into pieces.  These were then systematically retrieved.  Only sediment and dust remaining.  There was a large amount of dust and sediment     within the intrarenal collecting system.  Contrast dye was injected through the ureteroscope ensuring all calyces were thoroughly inspected.  There were no discrete intrarenal stones to treat. Once there were no further sizable stone fragments, ureteroscopy proceeded down     the length of the ureter      with retrieval of the access sheath, leaving the safety wire in place. There     were no further ureteral calculi      or fragments appreciated. After withdrawing the      ureteroscope, as well as the access sheath, the wire was back loaded through      the cystoscope and 6 x 26 double J stent was placed over the wire under      direct visualization.  Upon retrieval of the wire, there was good proximal      coil noted on x-ray, as well as distal coil within the bladder. At this      point, the procedure was deemed terminated.  The patient's bladder was then     emptied and the cystoscope removed.      He was then placed back in the supine position and awakened from general      anesthesia and transferred to the PACU in good condition.

## 2023-11-07 NOTE — ANESTHESIA POSTPROCEDURE EVALUATION
Patient: Tate Kelley    Procedure Summary       Date: 11/07/23 Room / Location: [facility] OR 08 / [facility] MAIN OR    Anesthesia Start: 1316 Anesthesia Stop: 1447    Procedure: CYSTOSCOPY URETEROSCOPY RETROGRADE PYELOGRAM HOLMIUM LASER STENT EXCHANGE  left (Left: Ureter) Diagnosis:       Ureterolithiasis      (Ureterolithiasis [N20.1])    Surgeons: Sarah Palacios MD Provider: Rashid Rosenthal MD    Anesthesia Type: general ASA Status: 4            Anesthesia Type: general    Vitals  Vitals Value Taken Time   /88 11/07/23 1523   Temp 36.1 °C (97 °F) 11/07/23 1448   Pulse 104 11/07/23 1528   Resp 20 11/07/23 1448   SpO2 95 % 11/07/23 1528   Vitals shown include unfiled device data.        Post Anesthesia Care and Evaluation    Patient location during evaluation: bedside  Patient participation: complete - patient participated  Level of consciousness: awake  Pain management: adequate    Airway patency: patent  PONV Status: none  Cardiovascular status: acceptable and stable  Respiratory status: acceptable  Hydration status: acceptable    Comments: An Anesthesiologist personally participated in the most demanding procedures (including induction and emergence if applicable) in the anesthesia plan, monitored the course of anesthesia administration at frequent intervals and remained physically present and available for immediate diagnosis and treatment of emergencies.

## 2023-11-07 NOTE — PROGRESS NOTES
"Called to [facility] Phase 2 recovery to eval Mr Kelley.  Pt states \"doesn't feel well\".  Shivering.  Lungs clear, tachycardic.  Transferred back to main PACU.  EKG Sinus Tach, CXR, CBC and BMP reviewed.  ABG 7.4/29/65/   Lacate 3.7    -2L LR bolus  -, esmolol 50mg IV, now 120's  -Add face mask.    -2nd IV started    Pt seen with dr Tierney and Dr Shipman.      Presumed sepsis.  Abx, admit.  D/w family  "

## 2023-11-07 NOTE — PERIOPERATIVE NURSING NOTE
Patient temperature reading 105 via temporal thermometer. Patient returned to PACU and placed on monitor. Oral temp 100.2 in PACU. Dr. Rosenthal and hospitalist at bedside in PACU.

## 2023-11-07 NOTE — PERIOPERATIVE NURSING NOTE
Patient arrived to floor at 1625. At 1635, patient started shivering violently. Heart rate elevated in 120s-150s. Patient states he feels cold. Temporal temp of 99.7. Dr. Duarte at bedside. EKG ordered, stat CBC and BMP obtained and sent to lab. Chest xray done at bedside. Dr. Duarte spoke with Dr. Palacios. Dr. Palacios to come see patient. Awaiting results and further orders.

## 2023-11-08 LAB
ALBUMIN SERPL-MCNC: 3.6 G/DL (ref 3.5–5.2)
ALBUMIN/GLOB SERPL: 1.4 G/DL
ALP SERPL-CCNC: 67 U/L (ref 39–117)
ALT SERPL W P-5'-P-CCNC: 33 U/L (ref 1–41)
ANION GAP SERPL CALCULATED.3IONS-SCNC: 13.5 MMOL/L (ref 5–15)
AST SERPL-CCNC: 29 U/L (ref 1–40)
BACTERIA UR QL AUTO: ABNORMAL /HPF
BILIRUB SERPL-MCNC: 0.9 MG/DL (ref 0–1.2)
BILIRUB UR QL STRIP: ABNORMAL
BUN SERPL-MCNC: 29 MG/DL (ref 8–23)
BUN/CREAT SERPL: 14.1 (ref 7–25)
CALCIUM SPEC-SCNC: 8.7 MG/DL (ref 8.6–10.5)
CHLORIDE SERPL-SCNC: 102 MMOL/L (ref 98–107)
CK SERPL-CCNC: 219 U/L (ref 20–200)
CLARITY UR: ABNORMAL
CO2 SERPL-SCNC: 20.5 MMOL/L (ref 22–29)
COLOR UR: ABNORMAL
CREAT SERPL-MCNC: 2.05 MG/DL (ref 0.76–1.27)
DEPRECATED RDW RBC AUTO: 46.1 FL (ref 37–54)
EGFRCR SERPLBLD CKD-EPI 2021: 33.8 ML/MIN/1.73
ERYTHROCYTE [DISTWIDTH] IN BLOOD BY AUTOMATED COUNT: 14 % (ref 12.3–15.4)
GLOBULIN UR ELPH-MCNC: 2.5 GM/DL
GLUCOSE BLDC GLUCOMTR-MCNC: 136 MG/DL (ref 70–99)
GLUCOSE BLDC GLUCOMTR-MCNC: 160 MG/DL (ref 70–99)
GLUCOSE BLDC GLUCOMTR-MCNC: 279 MG/DL (ref 70–99)
GLUCOSE BLDC GLUCOMTR-MCNC: 312 MG/DL (ref 70–99)
GLUCOSE SERPL-MCNC: 310 MG/DL (ref 65–99)
GLUCOSE UR STRIP-MCNC: ABNORMAL MG/DL
HCT VFR BLD AUTO: 44.8 % (ref 37.5–51)
HCT VFR BLD AUTO: 45.2 % (ref 37.5–51)
HGB BLD-MCNC: 14.3 G/DL (ref 13–17.7)
HGB BLD-MCNC: 14.3 G/DL (ref 13–17.7)
HGB UR QL STRIP.AUTO: ABNORMAL
KETONES UR QL STRIP: ABNORMAL
LEUKOCYTE ESTERASE UR QL STRIP.AUTO: ABNORMAL
LYMPHOCYTES # BLD MANUAL: 0.64 10*3/MM3 (ref 0.7–3.1)
LYMPHOCYTES NFR BLD MANUAL: 1 % (ref 5–12)
MAGNESIUM SERPL-MCNC: 1.2 MG/DL (ref 1.6–2.4)
MCH RBC QN AUTO: 28.5 PG (ref 26.6–33)
MCHC RBC AUTO-ENTMCNC: 31.6 G/DL (ref 31.5–35.7)
MCV RBC AUTO: 90 FL (ref 79–97)
METAMYELOCYTES NFR BLD MANUAL: 2 % (ref 0–0)
MONOCYTES # BLD: 0.21 10*3/MM3 (ref 0.1–0.9)
NEUTROPHILS # BLD AUTO: 20.2 10*3/MM3 (ref 1.7–7)
NEUTROPHILS NFR BLD MANUAL: 57 % (ref 42.7–76)
NEUTS BAND NFR BLD MANUAL: 37 % (ref 0–5)
NITRITE UR QL STRIP: ABNORMAL
PH UR STRIP.AUTO: ABNORMAL [PH]
PLATELET # BLD AUTO: 139 10*3/MM3 (ref 140–450)
PMV BLD AUTO: 9.9 FL (ref 6–12)
POTASSIUM SERPL-SCNC: 4.8 MMOL/L (ref 3.5–5.2)
PROCALCITONIN SERPL-MCNC: 88.42 NG/ML (ref 0–0.25)
PROT SERPL-MCNC: 6.1 G/DL (ref 6–8.5)
PROT UR QL STRIP: ABNORMAL
QT INTERVAL: 289 MS
QTC INTERVAL: 436 MS
RBC # BLD AUTO: 5.02 10*6/MM3 (ref 4.14–5.8)
RBC # UR STRIP: ABNORMAL /HPF
RBC MORPH BLD: NORMAL
REF LAB TEST METHOD: ABNORMAL
SCAN SLIDE: NORMAL
SMALL PLATELETS BLD QL SMEAR: ABNORMAL
SODIUM SERPL-SCNC: 136 MMOL/L (ref 136–145)
SODIUM UR-SCNC: 69 MMOL/L
SP GR UR STRIP: 1.02 (ref 1–1.03)
SQUAMOUS #/AREA URNS HPF: ABNORMAL /HPF
TRANS CELLS #/AREA URNS HPF: ABNORMAL /HPF
UROBILINOGEN UR QL STRIP: ABNORMAL
VARIANT LYMPHS NFR BLD MANUAL: 3 % (ref 19.6–45.3)
WBC # UR STRIP: ABNORMAL /HPF
WBC MORPH BLD: NORMAL
WBC NRBC COR # BLD: 21.49 10*3/MM3 (ref 3.4–10.8)

## 2023-11-08 PROCEDURE — 87077 CULTURE AEROBIC IDENTIFY: CPT | Performed by: INTERNAL MEDICINE

## 2023-11-08 PROCEDURE — 25010000002 PIPERACILLIN SOD-TAZOBACTAM PER 1 G: Performed by: INTERNAL MEDICINE

## 2023-11-08 PROCEDURE — 94799 UNLISTED PULMONARY SVC/PX: CPT

## 2023-11-08 PROCEDURE — 99291 CRITICAL CARE FIRST HOUR: CPT | Performed by: INTERNAL MEDICINE

## 2023-11-08 PROCEDURE — 84300 ASSAY OF URINE SODIUM: CPT | Performed by: INTERNAL MEDICINE

## 2023-11-08 PROCEDURE — 83735 ASSAY OF MAGNESIUM: CPT | Performed by: INTERNAL MEDICINE

## 2023-11-08 PROCEDURE — 81001 URINALYSIS AUTO W/SCOPE: CPT | Performed by: INTERNAL MEDICINE

## 2023-11-08 PROCEDURE — 84145 PROCALCITONIN (PCT): CPT | Performed by: INTERNAL MEDICINE

## 2023-11-08 PROCEDURE — 25810000003 LACTATED RINGERS PER 1000 ML: Performed by: NURSE PRACTITIONER

## 2023-11-08 PROCEDURE — 25810000003 LACTATED RINGERS PER 1000 ML: Performed by: INTERNAL MEDICINE

## 2023-11-08 PROCEDURE — 82550 ASSAY OF CK (CPK): CPT | Performed by: NURSE PRACTITIONER

## 2023-11-08 PROCEDURE — 85025 COMPLETE CBC W/AUTO DIFF WBC: CPT | Performed by: INTERNAL MEDICINE

## 2023-11-08 PROCEDURE — 80053 COMPREHEN METABOLIC PANEL: CPT | Performed by: INTERNAL MEDICINE

## 2023-11-08 PROCEDURE — 85014 HEMATOCRIT: CPT | Performed by: INTERNAL MEDICINE

## 2023-11-08 PROCEDURE — 63710000001 INSULIN LISPRO (HUMAN) PER 5 UNITS: Performed by: INTERNAL MEDICINE

## 2023-11-08 PROCEDURE — 94761 N-INVAS EAR/PLS OXIMETRY MLT: CPT

## 2023-11-08 PROCEDURE — 82948 REAGENT STRIP/BLOOD GLUCOSE: CPT

## 2023-11-08 PROCEDURE — 25010000002 MAGNESIUM SULFATE 2 GM/50ML SOLUTION: Performed by: INTERNAL MEDICINE

## 2023-11-08 PROCEDURE — 87086 URINE CULTURE/COLONY COUNT: CPT | Performed by: INTERNAL MEDICINE

## 2023-11-08 PROCEDURE — 87186 SC STD MICRODIL/AGAR DIL: CPT | Performed by: INTERNAL MEDICINE

## 2023-11-08 PROCEDURE — 87150 DNA/RNA AMPLIFIED PROBE: CPT | Performed by: INTERNAL MEDICINE

## 2023-11-08 PROCEDURE — 85018 HEMOGLOBIN: CPT | Performed by: INTERNAL MEDICINE

## 2023-11-08 PROCEDURE — 87040 BLOOD CULTURE FOR BACTERIA: CPT | Performed by: INTERNAL MEDICINE

## 2023-11-08 PROCEDURE — 99231 SBSQ HOSP IP/OBS SF/LOW 25: CPT | Performed by: UROLOGY

## 2023-11-08 PROCEDURE — 25010000002 GENTAMICIN PER 80 MG: Performed by: INTERNAL MEDICINE

## 2023-11-08 RX ORDER — FAMOTIDINE 20 MG/1
20 TABLET, FILM COATED ORAL NIGHTLY
Status: DISCONTINUED | OUTPATIENT
Start: 2023-11-08 | End: 2023-11-13 | Stop reason: HOSPADM

## 2023-11-08 RX ORDER — FEXOFENADINE HCL 180 MG/1
180 TABLET ORAL DAILY PRN
COMMUNITY

## 2023-11-08 RX ORDER — SODIUM CHLORIDE, SODIUM LACTATE, POTASSIUM CHLORIDE, CALCIUM CHLORIDE 600; 310; 30; 20 MG/100ML; MG/100ML; MG/100ML; MG/100ML
125 INJECTION, SOLUTION INTRAVENOUS CONTINUOUS
Status: DISCONTINUED | OUTPATIENT
Start: 2023-11-08 | End: 2023-11-09

## 2023-11-08 RX ORDER — HYDRALAZINE HYDROCHLORIDE 20 MG/ML
10 INJECTION INTRAMUSCULAR; INTRAVENOUS EVERY 6 HOURS PRN
Status: DISCONTINUED | OUTPATIENT
Start: 2023-11-08 | End: 2023-11-13 | Stop reason: HOSPADM

## 2023-11-08 RX ORDER — MAGNESIUM SULFATE 1 G/100ML
2 INJECTION INTRAVENOUS
Status: DISCONTINUED | OUTPATIENT
Start: 2023-11-08 | End: 2023-11-08

## 2023-11-08 RX ORDER — TAMSULOSIN HYDROCHLORIDE 0.4 MG/1
2 CAPSULE ORAL DAILY
COMMUNITY

## 2023-11-08 RX ORDER — MAGNESIUM SULFATE HEPTAHYDRATE 40 MG/ML
2 INJECTION, SOLUTION INTRAVENOUS
Status: DISCONTINUED | OUTPATIENT
Start: 2023-11-08 | End: 2023-11-08

## 2023-11-08 RX ORDER — BUTALBITAL, ACETAMINOPHEN AND CAFFEINE 300; 40; 50 MG/1; MG/1; MG/1
1 CAPSULE ORAL EVERY 6 HOURS PRN
Status: DISCONTINUED | OUTPATIENT
Start: 2023-11-08 | End: 2023-11-13 | Stop reason: HOSPADM

## 2023-11-08 RX ORDER — PSEUDOEPHEDRINE HCL 30 MG
30 TABLET ORAL EVERY 12 HOURS PRN
Status: ON HOLD | COMMUNITY
End: 2023-11-13

## 2023-11-08 RX ORDER — TAMSULOSIN HYDROCHLORIDE 0.4 MG/1
0.4 CAPSULE ORAL DAILY
Status: DISCONTINUED | OUTPATIENT
Start: 2023-11-08 | End: 2023-11-11

## 2023-11-08 RX ADMIN — FAMOTIDINE 20 MG: 20 TABLET, FILM COATED ORAL at 20:07

## 2023-11-08 RX ADMIN — INSULIN LISPRO 5 UNITS: 100 INJECTION, SOLUTION INTRAVENOUS; SUBCUTANEOUS at 09:35

## 2023-11-08 RX ADMIN — GENTAMICIN SULFATE 780 MG: 40 INJECTION, SOLUTION INTRAMUSCULAR; INTRAVENOUS at 18:22

## 2023-11-08 RX ADMIN — SODIUM CHLORIDE, POTASSIUM CHLORIDE, SODIUM LACTATE AND CALCIUM CHLORIDE 125 ML/HR: 600; 310; 30; 20 INJECTION, SOLUTION INTRAVENOUS at 20:07

## 2023-11-08 RX ADMIN — SODIUM CHLORIDE, POTASSIUM CHLORIDE, SODIUM LACTATE AND CALCIUM CHLORIDE 125 ML/HR: 600; 310; 30; 20 INJECTION, SOLUTION INTRAVENOUS at 09:35

## 2023-11-08 RX ADMIN — MAGNESIUM SULFATE HEPTAHYDRATE 2 G: 40 INJECTION, SOLUTION INTRAVENOUS at 10:00

## 2023-11-08 RX ADMIN — Medication 10 ML: at 09:35

## 2023-11-08 RX ADMIN — INSULIN LISPRO 4 UNITS: 100 INJECTION, SOLUTION INTRAVENOUS; SUBCUTANEOUS at 12:36

## 2023-11-08 RX ADMIN — INSULIN LISPRO 2 UNITS: 100 INJECTION, SOLUTION INTRAVENOUS; SUBCUTANEOUS at 20:07

## 2023-11-08 RX ADMIN — Medication 10 ML: at 20:07

## 2023-11-08 RX ADMIN — PIPERACILLIN AND TAZOBACTAM 4.5 G: 4; .5 INJECTION, POWDER, FOR SOLUTION INTRAVENOUS at 10:33

## 2023-11-08 RX ADMIN — PIPERACILLIN AND TAZOBACTAM 4.5 G: 4; .5 INJECTION, POWDER, FOR SOLUTION INTRAVENOUS at 00:51

## 2023-11-08 RX ADMIN — MAGNESIUM SULFATE HEPTAHYDRATE 2 G: 40 INJECTION, SOLUTION INTRAVENOUS at 10:32

## 2023-11-08 NOTE — PROGRESS NOTES
"[facility] Clinical Pharmacy Services: Vancomycin Pharmacokinetic Initial Consult Note    Tate Kelley is a 73 y.o. male who is on day 1 of pharmacy to dose vancomycin for Sepsis.    Consult Information  Consulting Provider: Umberto  Planned Duration of Therapy: 7 days per consult   Was Patient Receiving Prior to Admission/Consult?: No  Loading Dose Ordered or Given: 3000 mg on  at 1924  PK/PD Target: -600 mg/L.hr   Relevant ID History: n/a  Other Antimicrobials: Piperacillin/Tazobactam    Imaging Reviewed?: Yes    Microbiology Data  MRSA PCR performed: No; Result: Not ordered due to excluded indication or presence of suspected abscess  Culture/Source:   11/3 Urine cx: >100K Staph, coag neg    Vitals/Labs  Ht: 185.4 cm (73\"); Wt: (!) 158 kg (347 lb 14.2 oz)  Temp (24hrs), Av.7 °F (37.1 °C), Min:97 °F (36.1 °C), Max:100.3 °F (37.9 °C)   Estimated Creatinine Clearance: 71.3 mL/min (A) (by C-G formula based on SCr of 1.47 mg/dL (H)).     Results from last 7 days   Lab Units 23  1720 23  0952   CREATININE mg/dL 1.47* 1.32*   WBC 10*3/mm3 3.62  --      Assessment/Plan:    Vancomycin Dose:  1500 mg IV every 24 hours; which provides the following predicted parameters:  AUC24,ss: 553 mg/L.hr  PAUC*: 73 %  Ctrough,ss: 13.8 mg/L  Pconc*: 34 %  Tox.: 9 %  Vanc Random planned for prior to 3rd dose or as clinically appropriate  Patient has order for Complete Metabolic Panel    Pharmacy will follow patient's kidney function and will adjust doses and obtain levels as necessary. Thank you for involving pharmacy in this patient's care. Please contact pharmacy with any questions or concerns.                           Britt Polk [facility]  Clinical Pharmacist    "

## 2023-11-08 NOTE — PLAN OF CARE
"Goal Outcome Evaluation:      Pt refused bariatric bed that was ordered per MD. Stated \"the bed is too fluffy\". Remains in regular bed. No complaints of pain, VSS.                   "

## 2023-11-08 NOTE — PROGRESS NOTES
[facility]   Urology Progress Note    Patient Name: Tate Kelley  : 1950  MRN: [id number]  Primary Care Physician:  Cassie Osuna PA  Date of admission: 2023    Subjective   Subjective     Required admission to ICU for suspicion of sepsis postop; patient with significant hematuria, started on CBI, now clear; patient states he is feeling somewhat better but short of breath      Objective   Objective     Vitals:   Temp:  [97 °F (36.1 °C)-100.9 °F (38.3 °C)] 99.5 °F (37.5 °C)  Heart Rate:  [] 111  Resp:  [16-42] 32  BP: ()/() 102/49  Flow (L/min):  [2-10] 2  Physical Exam     Alert and oriented x3  No acute distress  Unlabored respirations  Nontender/nondistended  Catheter in place, clear urine; scant hematuria       Result Review    Result Review:  I have personally reviewed the results from the time of this admission to 2023 07:32 EST and agree with these findings:  [x]  Laboratory  [x]  Microbiology  []  Radiology  []  EKG/Telemetry   []  Cardiology/Vascular   []  Pathology  []  Old records  []  Other:      Assessment & Plan   Assessment / Plan     Brief Patient Summary:  Tate Kelley is a 73 y.o. male status post left URS, laser lithotripsy, and stent insertion, 2023 with postoperative sepsis of urinary origin    Active Hospital Problems:  Active Hospital Problems    Diagnosis     **Ureterolithiasis     Sepsis      Plan:   Labs reviewed, leukocytosis 21.49; creatinine to 2.05  Okay to stop CBI, plug irrigation port  Patient with known prostatomegaly, recommend maintain Abebe catheter until clinically improved to ensure maximal drainage.  Maintain stent  Appreciate ICU and hospitalist service in the care of this patient  Will follow    Electronically signed by Sarah Palacios MD, 23, 7:32 AM EST.

## 2023-11-08 NOTE — SIGNIFICANT NOTE
11/08/23 1444   Plan   Plan ALETHA, RN met with patient at bedside.  Patient reports lives with spouse that can assist if needed.  Patient provides own ADL's.  [employer] coverage.  Reports no financial needs.  Facesheet verified.  Patient plans to return home with spouse and no needs at this time.  [facility]

## 2023-11-08 NOTE — PLAN OF CARE
Goal Outcome Evaluation:   Mr. Kelley, 72yo male who arrived to ICU on 11/7 post ureter stent placement. Patient arrived to ICU on simple face mask 10L but is now on 2L NC with O2 saturation of 91%. Patient HR has been 110-140 bpm and BP has been 100-160s systolic. Patient has large BM during shift. Currently on CBI due to clots and dark blood in urine. Patient tolerated CBI and has had 1250 ML urine output on shift so far. Patient complains of SOB after numerous turns to get on bedpan but oxygen saturation in staying greater than 90. Home CPAP utilized while patient was sleeping. Patients temperature was 100.9. 650mg tylenol given. Two other temperatures were 98.5 and 99.5. Patient complains on pain in lower abdomen but refuses pain medication

## 2023-11-08 NOTE — CONSULTS
"Pulmonary / Critical Care Consult Note      Patient Name: Tate Kelley  : 1950  MRN: [id number]  Primary Care Physician:  Cassie Osuna PA  Referring Physician: Sarah Palacios MD  Date of admission: 2023    Subjective   Subjective     Reason for Consult/ Chief Complaint:   Sepsis    HPI:  Tate Kelley is a 73 y.o. male patient underwent cystoscopy with pyelogram uroscopy yesterday for left ureteral stone.  Afterwards patient markedly elevated fevers and feeling poorly.  Blood pressure soft with movement to ICU.  Has had worsening renal failure but is responding with IV fluids.  Urine culture a few days ago did have greater than 100,000 coag negative staph.  Creatinine is up over 2.  Did have some hematuria and has been started on CBI per urology.  Hematuria is improving.  Does have multiple electrolyte disturbances.  Complaining of some abdominal pain, fatigue and weakness.  Not on pressors.        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, WEAK L ARM    Tachycardia     NO MEDS CURRENTLY ASYMPTOMATIC, DENIES CP, SOAE    Uric acid kidney stone 2019       Past Surgical History:   Procedure Laterality Date    ABDOMINAL SURGERY      UMBILICAL HERNIA REPAIR     AMPUTATION      " LBN    COLONOSCOPY      CYSTOSCOPY URETEROSCOPY Left 06/28/2021    Procedure: CYSTOSCOPY URETEROSCOPY RETROGRADE PYELOGRAM STENT EXCHANGE LEFT;  Surgeon: Sarah Palacios MD;  Location: [facility] MAIN OR;  Service: Urology;  Laterality: Left;    CYSTOSCOPY W/ URETERAL STENT PLACEMENT Left 09/22/2023    Procedure: CYSTOSCOPY URETERAL CATHETER/STENT INSERTION;  Surgeon: Brayden Medrano MD;  Location: [facility] MAIN OR;  Service: Urology;  Laterality: Left;    ENDOSCOPY      FOOT SURGERY Right     CARTLEGE REPAIR    KIDNEY STONE SURGERY      STENT INPLACED    KNEE SURGERY      ARTHROSCOPE    MOUTH SURGERY      TEETH EXTRACTED       Family History: family history includes Heart attack in his father; Hypertension in his mother; Leukemia in his mother; Prostate cancer in his father. Otherwise pertinent FHx was reviewed and not pertinent to current issue.    Social History:  reports that he has never smoked. He has never used smokeless tobacco. He reports that he does not drink alcohol and does not use drugs.    Home Medications:  aspirin, fish oil, glipizide, glucose, lisinopril, metFORMIN ER, oxyCODONE-acetaminophen, oxybutynin, phenazopyridine, tamsulosin, and vitamin D    Allergies:  Allergies   Allergen Reactions    Latex Rash    Saccharin Nausea And Vomiting    Tuna Oil [Fish Oil] Nausea And Vomiting       Objective    Objective     Vitals:   Temp:  [97 °F (36.1 °C)-100.9 °F (38.3 °C)] 99.5 °F (37.5 °C)  Heart Rate:  [] 111  Resp:  [16-42] 32  BP: ()/() 141/64  Flow (L/min):  [2-10] 2    Physical Exam:  Vital Signs Reviewed   General:  WDWN, Alert, NAD.    HEENT:  PERRL, EOMI.  OP, nares clear  Neck:  Supple, no JVD, no thyromegaly  Chest:  good aeration, clear to auscultation bilaterally, tympanic to percussion bilaterally, no work of breathing noted  CV: RRR, no MGR, pulses 2+, equal.  Abd:  Soft, NT, ND, + BS, no HSM, obese  EXT:  no clubbing, no cyanosis, left AKA, extensive right lower  extremity edema  Neuro:  A&Ox3, CN grossly intact, no focal deficits.  Skin: No rashes or lesions noted      Result Review    Result Review:  I have personally reviewed the results from the time of this admission to 11/8/2023 07:19 EST and agree with these findings:  [x]  Laboratory  [x]  Microbiology  [x]  Radiology  [x]  EKG/Telemetry   []  Cardiology/Vascular   []  Pathology  []  Old records  []  Other:  Most notable findings include:   Culture with heavy growth coag negative staph        Lab 11/08/23  0528 11/08/23  0010 11/07/23  1758 11/07/23  1720 11/07/23  0952   WBC 21.49*  --   --  3.62  --    HEMOGLOBIN 14.3 14.3  --  16.9  --    HEMATOCRIT 45.2 44.8  --  54.5*  --    PLATELETS 139*  --   --  205  --    SODIUM 136  --   --  138 139   SODIUM, ARTERIAL  --   --  138.4  --   --    POTASSIUM 4.8  --   --  4.2 4.6   CHLORIDE 102  --   --  100 105   CO2 20.5*  --   --  23.1 24.6   BUN 29*  --   --  23 23   CREATININE 2.05*  --   --  1.47* 1.32*   GLUCOSE 310*  --   --  208* 254*   GLUCOSE, ARTERIAL  --   --  226*  --   --    CALCIUM 8.7  --   --  9.6 9.5   TOTAL PROTEIN 6.1  --   --   --   --    ALBUMIN 3.6  --   --   --   --    GLOBULIN 2.5  --   --   --   --      Chest x-ray with cardiomegaly and pulmonary vascular congestion/low lung volumes      Assessment & Plan   Assessment / Plan     Active Hospital Problems:  Active Hospital Problems    Diagnosis     **Ureterolithiasis     Sepsis        Impression:  Sepsis, present on admission.  Postoperative  Left ureteral stone status post cystoscopy with pyelogram and uroscopy  Coag negative staph urinary tract infection  Acute on chronic kidney injury secondary to prerenal etiology versus acute tubular necrosis  Hypomagnesemia  Gross hematuria currently on CBI  Type 2 diabetes with hyperglycemia    Plan:  CBI per urology.  Appreciate assistance  We will do 24 hours of LR at 125 mL/h  Trend renal panel and electrolytes.  Replace magnesium IV  Continue  Flomax  De-escalate antibiotics and we will do a one-time dose of gentamicin given sensitivities are antibiogram and urine culture with heavy growth coag negative staph.  Blood cultures pending  Start sliding-scale insulin    DVT prophylaxis:  No DVT prophylaxis order currently exists.     Code Status and Medical Interventions:   Ordered at: 11/07/23 1816     Level Of Support Discussed With:    Patient     Code Status (Patient has no pulse and is not breathing):    CPR (Attempt to Resuscitate)     Medical Interventions (Patient has pulse or is breathing):    Full Support        The patient is critically ill in the ICU with sepsis, coag negative staph UTI, hypomagnesemia, acute on chronic kidney injury.  Multidisciplinary bedside critical care rounds were performed with nursing staff, respiratory therapy, pharmacy, nutritional services, social work. I have personally reviewed the chart, labs and any pertinent imaging available.  I have spent 30 minutes of critical care time, excluding procedures, in the care of this patient.    Electronically signed by Janusz Montelongo MD, 11/08/23, 11:31 AM EST.

## 2023-11-08 NOTE — PROGRESS NOTES
[facility]   Hospitalist Progress Note  Date: 2023  Patient Name: Tate Kelley  : 1950  MRN: [id number]  Date of admission: 2023      Subjective   Subjective     Chief Complaint: Tachycardic and febrile after left ureteral stone lithotripsy and stent exchange on     Summary:   Tate Kelley is a 73 y.o. male past medical history of obesity with BMI of 45, nephrolithiasis, type 2 diabetes, BPH, and hypertension who initially came in for cystoscopy with stent exchange and same-day surgery who developed fever and tachycardia so was taken to the PACU     History is obtained from the patient and the urologist.  Patient has been in and out of the OR multiple times for stent removals.  Today he seemed to be relatively well postoperatively.  However he developed fever, tachycardia, and rigors.  At this point he was moved to the PACU for further management and close monitoring.  Urology requested admission from internal medicine due to concern for possible sepsis.     In the PACU the patient's vital signs were as follows: Temperature was 99.5, pulse 150, respiratory rate was in the 20s to 30s, blood pressure 152/69, satting well on facemask.  CBC shows no significant abnormalities.  CMP shows a mild bump in his creatinine from 1.3->1.4.  ABG is 7.4/29/65.3 with a lactate of 3.7.  Initially, I was told his temperature was 105 and I was concern for NMS but upon seeing the patient clinically has temperature was actually 99.  He was not given dantrolene because there was no concern for NMS at that time although it was considered.  I asked the PACU to give the patient 2 L of lactated Ringer's.  We will dose with Zosyn and vancomycin.  Although initially the patient seem to be in sinus tachycardia on the monitor he was appearing to be in SVT which will be addressed once he gets to the ICU.  Patient will be admitted to the ICU due to sepsis from urinary source.    Interval Followup:    Continues to have fever 101.4.  Remains on 2 L nasal cannula 94% saturation.  Patient does not use oxygen at home.  Remains on home CPAP.  Heart rate better with blood pressure trending up.  Complaining of headache.  Hospital bed is uncomfortable otherwise no other complaints.  CBI stopped as urine is clearing.  Has Abebe catheter..    Review of Systems   All systems were reviewed and negative except for: Summary and interval follow-up    Objective   Objective     Vitals:   Temp:  [98.5 °F (36.9 °C)-101.4 °F (38.6 °C)] 99.8 °F (37.7 °C)  Heart Rate:  [0-160] 114  Resp:  [16-42] 28  BP: ()/(49-96) 127/65  Flow (L/min):  [2-10] 3  Physical Exam        Constitutional: Mild distress.              Eyes: Pupils equal, sclerae anicteric, no conjunctival injection              HENT: NCAT, mucous membranes moist              Neck: Supple, no thyromegaly, no lymphadenopathy, trachea midline              Respiratory: Clear to auscultation bilaterally, nonlabored respirations               Cardiovascular: RRR, no murmurs, rubs, or gallops, palpable pedal pulses bilaterally              Gastrointestinal: Positive bowel sounds, soft, nontender, nondistended              Musculoskeletal: Leg shows trace to 1+ pitting edema with chronic changes.  Left leg is amputated.  Left above-knee amputation              Psychiatric: Appropriate affect, cooperative              Neurologic: Oriented x 3, strength symmetric in all extremities, Cranial Nerves grossly intact to confrontation, speech clear              Skin: No rashes.  Chronic stasis changes right lower extremity    Result Review    Result Review:  I have personally reviewed the results for the past 24 hours and agree with these findings:  [x]  Laboratory  [x]  Microbiology  [x]  Radiology  [x]  EKG/Telemetry sinus tachycardia at 136.  Right bundle block.  QT interval 0.43  []  Cardiology/Vascular   []  Pathology  [x]  Old records  [x]  Other: Medications    Assessment &  Plan   Assessment / Plan     Assessment:  Sepsis present on admission.  Leukocytosis, tachycardia, fever and elevated procalcitonin.  UTI.  Recurrent nephrolithiasis with a left ureteric stone.  S/p lithotripsy and stent exchange on November 7.  Hematuria due to above.  S/p CBI.  Improved.  Morbid obesity BMI 46.8.  Diabetes mellitus with hyperglycemia.  Obstructive sleep apnea on home CPAP.  Acute renal insufficiency on chronic kidney disease stage IIIa with baseline creatinine 1.4-1.6.  BPH.  Hypertension.  Right bundle branch block.  Hypomagnesemia  History of CVA.    Plan:  May need to IV fluid as chest x-ray consistent with vascular congestion.  IV antibiotics.  Await culture data.  Wean off oxygen.  Flomax.  Continue Abebe catheter.  Appreciate intensivist input.  Appreciate urology input.  As needed IV Lopressor and hydralazine.  Sliding-scale insulin.  Continue home CPAP.  Transfer out of the unit  Bariatric bed  Discussed plan with RN and .  Disposition as per urology.    DVT prophylaxis:  Mechanical DVT prophylaxis orders are present.    CODE STATUS:   Level Of Support Discussed With: Patient  Code Status (Patient has no pulse and is not breathing): CPR (Attempt to Resuscitate)  Medical Interventions (Patient has pulse or is breathing): Full Support      Part of this note may be an electronic transcription/translation of spoken language to printed text using the Dragon Dictation System.     Electronically signed by Dg Chowdhury MD, 11/08/23, 5:24 PM EST.

## 2023-11-08 NOTE — NURSING NOTE
Umberto COLBY and Jessica ART at bedside. Patient complained on pain and dark red blood urine in catheter. After manual irrigation of 300, only 20ml returned. RN contacted urology and patient is going to be started on continuous bladder irrigation and reinsert 3 way ponce catheter.

## 2023-11-09 ENCOUNTER — APPOINTMENT (OUTPATIENT)
Dept: GENERAL RADIOLOGY | Facility: HOSPITAL | Age: 73
DRG: 854 | End: 2023-11-09
Payer: OTHER GOVERNMENT

## 2023-11-09 LAB
ALBUMIN SERPL-MCNC: 3.4 G/DL (ref 3.5–5.2)
ALBUMIN/GLOB SERPL: 1.2 G/DL
ALP SERPL-CCNC: 85 U/L (ref 39–117)
ALT SERPL W P-5'-P-CCNC: 40 U/L (ref 1–41)
ANION GAP SERPL CALCULATED.3IONS-SCNC: 10.3 MMOL/L (ref 5–15)
AST SERPL-CCNC: 39 U/L (ref 1–40)
BACTERIA BLD CULT: ABNORMAL
BACTERIA SPEC AEROBE CULT: NO GROWTH
BASOPHILS # BLD AUTO: 0.08 10*3/MM3 (ref 0–0.2)
BASOPHILS NFR BLD AUTO: 0.5 % (ref 0–1.5)
BILIRUB SERPL-MCNC: 0.9 MG/DL (ref 0–1.2)
BOTTLE TYPE: ABNORMAL
BUN SERPL-MCNC: 28 MG/DL (ref 8–23)
BUN/CREAT SERPL: 15.2 (ref 7–25)
CALCIUM SPEC-SCNC: 8.9 MG/DL (ref 8.6–10.5)
CHLORIDE SERPL-SCNC: 102 MMOL/L (ref 98–107)
CO2 SERPL-SCNC: 21.7 MMOL/L (ref 22–29)
CREAT SERPL-MCNC: 1.84 MG/DL (ref 0.76–1.27)
D-LACTATE SERPL-SCNC: 1.8 MMOL/L (ref 0.5–2)
DEPRECATED RDW RBC AUTO: 46.5 FL (ref 37–54)
EGFRCR SERPLBLD CKD-EPI 2021: 38.5 ML/MIN/1.73
EOSINOPHIL # BLD AUTO: 0.01 10*3/MM3 (ref 0–0.4)
EOSINOPHIL NFR BLD AUTO: 0.1 % (ref 0.3–6.2)
ERYTHROCYTE [DISTWIDTH] IN BLOOD BY AUTOMATED COUNT: 14.1 % (ref 12.3–15.4)
GLOBULIN UR ELPH-MCNC: 2.8 GM/DL
GLUCOSE BLDC GLUCOMTR-MCNC: 151 MG/DL (ref 70–99)
GLUCOSE BLDC GLUCOMTR-MCNC: 169 MG/DL (ref 70–99)
GLUCOSE BLDC GLUCOMTR-MCNC: 174 MG/DL (ref 70–99)
GLUCOSE BLDC GLUCOMTR-MCNC: 179 MG/DL (ref 70–99)
GLUCOSE SERPL-MCNC: 151 MG/DL (ref 65–99)
HCT VFR BLD AUTO: 41.9 % (ref 37.5–51)
HGB BLD-MCNC: 13.3 G/DL (ref 13–17.7)
IMM GRANULOCYTES # BLD AUTO: 0.74 10*3/MM3 (ref 0–0.05)
IMM GRANULOCYTES NFR BLD AUTO: 4.4 % (ref 0–0.5)
LAB AP CASE REPORT: NORMAL
LAB AP CLINICAL INFORMATION: NORMAL
LYMPHOCYTES # BLD AUTO: 2.02 10*3/MM3 (ref 0.7–3.1)
LYMPHOCYTES NFR BLD AUTO: 12 % (ref 19.6–45.3)
MAGNESIUM SERPL-MCNC: 1.7 MG/DL (ref 1.6–2.4)
MCH RBC QN AUTO: 28.6 PG (ref 26.6–33)
MCHC RBC AUTO-ENTMCNC: 31.7 G/DL (ref 31.5–35.7)
MCV RBC AUTO: 90.1 FL (ref 79–97)
MONOCYTES # BLD AUTO: 0.61 10*3/MM3 (ref 0.1–0.9)
MONOCYTES NFR BLD AUTO: 3.6 % (ref 5–12)
NEUTROPHILS NFR BLD AUTO: 13.44 10*3/MM3 (ref 1.7–7)
NEUTROPHILS NFR BLD AUTO: 79.4 % (ref 42.7–76)
NRBC BLD AUTO-RTO: 0 /100 WBC (ref 0–0.2)
PATH REPORT.FINAL DX SPEC: NORMAL
PATH REPORT.GROSS SPEC: NORMAL
PHOSPHATE SERPL-MCNC: 1.9 MG/DL (ref 2.5–4.5)
PLATELET # BLD AUTO: 122 10*3/MM3 (ref 140–450)
PMV BLD AUTO: 10.4 FL (ref 6–12)
POTASSIUM SERPL-SCNC: 4.1 MMOL/L (ref 3.5–5.2)
PROT SERPL-MCNC: 6.2 G/DL (ref 6–8.5)
RBC # BLD AUTO: 4.65 10*6/MM3 (ref 4.14–5.8)
SODIUM SERPL-SCNC: 134 MMOL/L (ref 136–145)
WBC NRBC COR # BLD: 16.9 10*3/MM3 (ref 3.4–10.8)

## 2023-11-09 PROCEDURE — 87040 BLOOD CULTURE FOR BACTERIA: CPT | Performed by: INTERNAL MEDICINE

## 2023-11-09 PROCEDURE — 0 DEXTROSE 5 % SOLUTION: Performed by: INTERNAL MEDICINE

## 2023-11-09 PROCEDURE — 82948 REAGENT STRIP/BLOOD GLUCOSE: CPT

## 2023-11-09 PROCEDURE — 25810000003 SODIUM CHLORIDE 0.9 % SOLUTION

## 2023-11-09 PROCEDURE — 87150 DNA/RNA AMPLIFIED PROBE: CPT | Performed by: INTERNAL MEDICINE

## 2023-11-09 PROCEDURE — 94799 UNLISTED PULMONARY SVC/PX: CPT

## 2023-11-09 PROCEDURE — 94640 AIRWAY INHALATION TREATMENT: CPT

## 2023-11-09 PROCEDURE — 87076 CULTURE ANAEROBE IDENT EACH: CPT | Performed by: INTERNAL MEDICINE

## 2023-11-09 PROCEDURE — 25010000002 VANCOMYCIN 5 G RECONSTITUTED SOLUTION

## 2023-11-09 PROCEDURE — 83605 ASSAY OF LACTIC ACID: CPT | Performed by: INTERNAL MEDICINE

## 2023-11-09 PROCEDURE — 71045 X-RAY EXAM CHEST 1 VIEW: CPT

## 2023-11-09 PROCEDURE — 99231 SBSQ HOSP IP/OBS SF/LOW 25: CPT | Performed by: UROLOGY

## 2023-11-09 PROCEDURE — 84100 ASSAY OF PHOSPHORUS: CPT | Performed by: INTERNAL MEDICINE

## 2023-11-09 PROCEDURE — 97161 PT EVAL LOW COMPLEX 20 MIN: CPT

## 2023-11-09 PROCEDURE — 83735 ASSAY OF MAGNESIUM: CPT | Performed by: INTERNAL MEDICINE

## 2023-11-09 PROCEDURE — 25810000003 LACTATED RINGERS PER 1000 ML: Performed by: INTERNAL MEDICINE

## 2023-11-09 PROCEDURE — 63710000001 INSULIN LISPRO (HUMAN) PER 5 UNITS: Performed by: INTERNAL MEDICINE

## 2023-11-09 PROCEDURE — 87185 SC STD ENZYME DETCJ PER NZM: CPT | Performed by: INTERNAL MEDICINE

## 2023-11-09 PROCEDURE — 80053 COMPREHEN METABOLIC PANEL: CPT | Performed by: INTERNAL MEDICINE

## 2023-11-09 PROCEDURE — 85025 COMPLETE CBC W/AUTO DIFF WBC: CPT | Performed by: INTERNAL MEDICINE

## 2023-11-09 RX ORDER — SODIUM PHOSPHATE IN D5W 15MMOL/250
15 PLASTIC BAG, INJECTION (ML) INTRAVENOUS ONCE
Status: COMPLETED | OUTPATIENT
Start: 2023-11-09 | End: 2023-11-09

## 2023-11-09 RX ORDER — AMOXICILLIN 250 MG
2 CAPSULE ORAL 2 TIMES DAILY
Status: DISCONTINUED | OUTPATIENT
Start: 2023-11-09 | End: 2023-11-13 | Stop reason: HOSPADM

## 2023-11-09 RX ORDER — ARFORMOTEROL TARTRATE 15 UG/2ML
15 SOLUTION RESPIRATORY (INHALATION)
Status: DISCONTINUED | OUTPATIENT
Start: 2023-11-09 | End: 2023-11-13 | Stop reason: HOSPADM

## 2023-11-09 RX ORDER — POLYETHYLENE GLYCOL 3350 17 G/17G
17 POWDER, FOR SOLUTION ORAL DAILY PRN
Status: DISCONTINUED | OUTPATIENT
Start: 2023-11-09 | End: 2023-11-13 | Stop reason: HOSPADM

## 2023-11-09 RX ORDER — BISACODYL 5 MG/1
5 TABLET, DELAYED RELEASE ORAL DAILY PRN
Status: DISCONTINUED | OUTPATIENT
Start: 2023-11-09 | End: 2023-11-13 | Stop reason: HOSPADM

## 2023-11-09 RX ORDER — BISACODYL 10 MG
10 SUPPOSITORY, RECTAL RECTAL DAILY PRN
Status: DISCONTINUED | OUTPATIENT
Start: 2023-11-09 | End: 2023-11-13 | Stop reason: HOSPADM

## 2023-11-09 RX ORDER — BUDESONIDE 0.5 MG/2ML
0.5 INHALANT ORAL
Status: DISCONTINUED | OUTPATIENT
Start: 2023-11-09 | End: 2023-11-13 | Stop reason: HOSPADM

## 2023-11-09 RX ORDER — IPRATROPIUM BROMIDE AND ALBUTEROL SULFATE 2.5; .5 MG/3ML; MG/3ML
3 SOLUTION RESPIRATORY (INHALATION) EVERY 4 HOURS PRN
Status: DISCONTINUED | OUTPATIENT
Start: 2023-11-09 | End: 2023-11-13 | Stop reason: HOSPADM

## 2023-11-09 RX ADMIN — SODIUM PHOSPHATE, MONOBASIC, MONOHYDRATE AND SODIUM PHOSPHATE, DIBASIC, ANHYDROUS 15 MMOL: 276; 142 INJECTION, SOLUTION INTRAVENOUS at 11:47

## 2023-11-09 RX ADMIN — INSULIN LISPRO 2 UNITS: 100 INJECTION, SOLUTION INTRAVENOUS; SUBCUTANEOUS at 12:26

## 2023-11-09 RX ADMIN — TAMSULOSIN HYDROCHLORIDE 0.4 MG: 0.4 CAPSULE ORAL at 08:12

## 2023-11-09 RX ADMIN — Medication 10 ML: at 08:12

## 2023-11-09 RX ADMIN — INSULIN LISPRO 2 UNITS: 100 INJECTION, SOLUTION INTRAVENOUS; SUBCUTANEOUS at 22:00

## 2023-11-09 RX ADMIN — VANCOMYCIN HYDROCHLORIDE 1000 MG: 500 INJECTION, POWDER, LYOPHILIZED, FOR SOLUTION INTRAVENOUS at 05:01

## 2023-11-09 RX ADMIN — INSULIN LISPRO 2 UNITS: 100 INJECTION, SOLUTION INTRAVENOUS; SUBCUTANEOUS at 08:12

## 2023-11-09 RX ADMIN — BUTALBITA,ACETAMINOPHEN AND CAFFEINE 1 CAPSULE: 50; 300; 40 CAPSULE ORAL at 06:11

## 2023-11-09 RX ADMIN — SODIUM CHLORIDE, POTASSIUM CHLORIDE, SODIUM LACTATE AND CALCIUM CHLORIDE 125 ML/HR: 600; 310; 30; 20 INJECTION, SOLUTION INTRAVENOUS at 03:26

## 2023-11-09 RX ADMIN — INSULIN LISPRO 2 UNITS: 100 INJECTION, SOLUTION INTRAVENOUS; SUBCUTANEOUS at 18:46

## 2023-11-09 RX ADMIN — BUDESONIDE 0.5 MG: 0.5 INHALANT RESPIRATORY (INHALATION) at 18:26

## 2023-11-09 RX ADMIN — Medication 10 ML: at 21:58

## 2023-11-09 RX ADMIN — FAMOTIDINE 20 MG: 20 TABLET, FILM COATED ORAL at 21:57

## 2023-11-09 RX ADMIN — ARFORMOTEROL TARTRATE 15 MCG: 15 SOLUTION RESPIRATORY (INHALATION) at 18:25

## 2023-11-09 NOTE — PLAN OF CARE
Problem: Adult Inpatient Plan of Care  Goal: Plan of Care Review  Outcome: Ongoing, Progressing  Goal: Patient-Specific Goal (Individualized)  Outcome: Ongoing, Progressing  Goal: Absence of Hospital-Acquired Illness or Injury  Outcome: Ongoing, Progressing  Intervention: Identify and Manage Fall Risk  Recent Flowsheet Documentation  Taken 11/9/2023 0815 by Randi De Oliveira RN  Safety Promotion/Fall Prevention: safety round/check completed  Intervention: Prevent Skin Injury  Recent Flowsheet Documentation  Taken 11/9/2023 0815 by Randi De Oliveira RN  Body Position: dangle, side of bed  Intervention: Prevent and Manage VTE (Venous Thromboembolism) Risk  Recent Flowsheet Documentation  Taken 11/9/2023 0815 by Randi De Oliveira RN  Activity Management: sitting, edge of bed  Goal: Optimal Comfort and Wellbeing  Outcome: Ongoing, Progressing  Intervention: Provide Person-Centered Care  Recent Flowsheet Documentation  Taken 11/9/2023 0815 by Randi De Oliveira RN  Trust Relationship/Rapport:   care explained   choices provided  Goal: Readiness for Transition of Care  Outcome: Ongoing, Progressing     Problem: Fall Injury Risk  Goal: Absence of Fall and Fall-Related Injury  Outcome: Ongoing, Progressing  Intervention: Promote Injury-Free Environment  Recent Flowsheet Documentation  Taken 11/9/2023 0815 by Randi De Oliveira RN  Safety Promotion/Fall Prevention: safety round/check completed     Problem: Skin Injury Risk Increased  Goal: Skin Health and Integrity  Outcome: Ongoing, Progressing   Goal Outcome Evaluation:        Patient declines pain medicine for intermittent headache. Wife dropped off wheelchair today. Surgeon likely to pull ponce out tomorrow.

## 2023-11-09 NOTE — PROGRESS NOTES
"[facility] Clinical Pharmacy Services: Vancomycin Pharmacokinetic Initial Consult Note    Tate Kelley is a 73 y.o. male who is on day 1 of pharmacy to dose vancomycin for Sepsis.    Consult Information  Consulting Provider: Nicolas  Planned Duration of Therapy: 7 days  Was Patient Receiving Prior to Admission/Consult?: No (received a 3gm dose on , one time dose)  Loading Dose Ordered or Given: 3000 mg on  at 1924  PK/PD Target: -600 mg/L.hr   Relevant ID History: Blood and urine cultures ordered  Other Antimicrobials:Zosyn and gentamicin both stopped after 24 hours    Imaging Reviewed?: Yes    Microbiology Data  MRSA PCR performed: No; Result: Not ordered due to excluded indication or presence of suspected abscess  Culture/Source:   Blood and Urine cultures in process    Vitals/Labs  Ht: 185.4 cm (73\"); Wt: (!) 161 kg (354 lb 11.5 oz)  Temp (24hrs), Av °F (37.2 °C), Min:97.9 °F (36.6 °C), Max:100.3 °F (37.9 °C)   Estimated Creatinine Clearance: 57.5 mL/min (A) (by C-G formula based on SCr of 1.84 mg/dL (H)).       Results from last 7 days   Lab Units 23  0351 23  0528 23  1720   CREATININE mg/dL 1.84* 2.05* 1.47*   WBC 10*3/mm3 16.90* 21.49* 3.62     Assessment/Plan:    Vancomycin Dose:  1000 mg IV every 24 hours; which provides the following predicted parameters:  AUC24,ss: 497 mg/L.hr  PAUC*: 67 %  Ctrough,ss: 14.2 mg/L  Pconc*: 32 %  Tox.: 9 %  Vanc Random ordered for 11/10 at 0400  Patient has order for Renal Function Panel    Pharmacy will follow patient's kidney function and will adjust doses and obtain levels as necessary. Thank you for involving pharmacy in this patient's care. Please contact pharmacy with any questions or concerns.                           Carol Isaac  Clinical Pharmacist   "

## 2023-11-09 NOTE — PROGRESS NOTES
[facility]   Hospitalist Progress Note  Date: 2023  Patient Name: Tate Kelley  : 1950  MRN: [id number]  Date of admission: 2023      Subjective   Subjective     Chief Complaint: Tachycardic and febrile after left ureteral stone lithotripsy and stent exchange on     Summary:   Tate Kelley is a 73 y.o. male past medical history of obesity with BMI of 45, nephrolithiasis, type 2 diabetes, BPH, and hypertension who initially came in for cystoscopy with stent exchange and same-day surgery who developed fever and tachycardia so was taken to the PACU     History is obtained from the patient and the urologist.  Patient has been in and out of the OR multiple times for stent removals.  Today he seemed to be relatively well postoperatively.  However he developed fever, tachycardia, and rigors.  At this point he was moved to the PACU for further management and close monitoring.  Urology requested admission from internal medicine due to concern for possible sepsis.     In the PACU the patient's vital signs were as follows: Temperature was 99.5, pulse 150, respiratory rate was in the 20s to 30s, blood pressure 152/69, satting well on facemask.  CBC shows no significant abnormalities.  CMP shows a mild bump in his creatinine from 1.3->1.4.  ABG is 7.4/29/65.3 with a lactate of 3.7.  Initially, I was told his temperature was 105 and I was concern for NMS but upon seeing the patient clinically has temperature was actually 99.  He was not given dantrolene because there was no concern for NMS at that time although it was considered.  I asked the PACU to give the patient 2 L of lactated Ringer's.  We will dose with Zosyn and vancomycin.  Although initially the patient seem to be in sinus tachycardia on the monitor he was appearing to be in SVT which will be addressed once he gets to the ICU.  Patient will be admitted to the ICU due to sepsis from urinary source.    Interval Followup:     no fever today.  Tmax 101.4.  Remains on 2 L nasal cannula 94% saturation.  Patient does not use oxygen at home.  Remains on home CPAP.  Complaining of shortness of air.  No chest pain.  Wants nebulizer treatment  Heart rate better with blood pressure trending up.  Complaining of headache.   good urine output.  Clear urine has Abebe catheter..    Review of Systems   All systems were reviewed and negative except for: Summary and interval follow-up    Objective   Objective     Vitals:   Temp:  [97.3 °F (36.3 °C)-99.5 °F (37.5 °C)] 97.3 °F (36.3 °C)  Heart Rate:  [103-123] 110  Resp:  [18-22] 18  BP: (105-129)/(51-63) 112/61  Flow (L/min):  [2-2.5] 2.5  Physical Exam        Constitutional: Mild distress.              Eyes: Pupils equal, sclerae anicteric, no conjunctival injection              HENT: NCAT, mucous membranes moist              Neck: Supple, no thyromegaly, no lymphadenopathy, trachea midline              Respiratory: Clear to auscultation bilaterally, nonlabored respirations               Cardiovascular: RRR, no murmurs, rubs, or gallops, palpable pedal pulses bilaterally              Gastrointestinal: Positive bowel sounds, soft, nontender, nondistended              Musculoskeletal: Leg shows trace to 1+ pitting edema with chronic changes.  Left leg is amputated.  Left above-knee amputation              Psychiatric: Appropriate affect, cooperative              Neurologic: Oriented x 3, strength symmetric in all extremities, Cranial Nerves grossly intact to confrontation, speech clear              Skin: No rashes.  Chronic stasis changes right lower extremity    Result Review    Result Review:  I have personally reviewed the results for the past 24 hours and agree with these findings:  [x]  Laboratory  [x]  Microbiology  [x]  Radiology  [x]  EKG/Telemetry sinus tachycardia at 136.  Right bundle block.  QT interval 0.43  []  Cardiology/Vascular   []  Pathology  [x]  Old records  [x]  Other:  Medications    Assessment & Plan   Assessment / Plan     Assessment:  Sepsis present on admission.  Leukocytosis, tachycardia, fever and elevated procalcitonin.  UTI.  November 8 th blood culture positive for staph .  Question contamination  Recurrent nephrolithiasis with a left ureteric stone.  S/p lithotripsy and stent exchange on November 7.  Hematuria due to above.  S/p CBI.  Improved.  Morbid obesity BMI 46.8.  Diabetes mellitus with hyperglycemia.  Obstructive sleep apnea on home CPAP.  Acute renal insufficiency on chronic kidney disease stage IIIa with baseline creatinine 1.4-1.6.  Improving  BPH.  Hypertension.  Right bundle branch block.  Hypomagnesemia.  Supplemented  History of CVA.    Plan:  May need to IV Lasix as chest x-ray consistent with vascular congestion.  Off IV fluid  Bronchodilator and bronchopulmonary hygiene protocol.  DuoNeb, Pulmicort brovana neb.  Repeat chest x-ray November 9 left lower lobe basal opacity.  BNP - November 7  Trend procalcitonin.  IV  Vanco.  Await culture data.  Wean off oxygen.  Flomax.  Continue Abebe catheter.  Trial of voiding in a.m.  Appreciate intensivist input.  Appreciate urology input.  As needed IV Lopressor and hydralazine.  Sliding-scale insulin.  Continue home CPAP.  Monitor and replace electrolytes  Patient does not want bariatric bed  PT OT  Discussed plan with RN and .  Disposition as per urology.    DVT prophylaxis:  Mechanical DVT prophylaxis orders are present.    CODE STATUS:   Level Of Support Discussed With: Patient  Code Status (Patient has no pulse and is not breathing): CPR (Attempt to Resuscitate)  Medical Interventions (Patient has pulse or is breathing): Full Support      Part of this note may be an electronic transcription/translation of spoken language to printed text using the Dragon Dictation System.       Electronically signed by Dg Chowdhury MD, 11/09/23, 3:50 PM EST.

## 2023-11-09 NOTE — PROGRESS NOTES
[facility]   Urology Progress Note    Patient Name: Tate Kelley  : 1950  MRN: [id number]  Primary Care Physician:  Cassie Osuna PA  Date of admission: 2023    Subjective   Subjective     C/o persistent shortness of air; sitting on side of bed      Objective   Objective     Vitals:   Temp:  [98.1 °F (36.7 °C)-100.3 °F (37.9 °C)] 98.1 °F (36.7 °C)  Heart Rate:  [0-123] 107  Resp:  [18-28] 20  BP: (105-181)/(51-96) 105/63  Flow (L/min):  [2.5] 2.5  Physical Exam     Alert and oriented x3  No acute distress  Unlabored respirations  Nontender/nondistended  Catheter in place, clear urine       Result Review    Result Review:  I have personally reviewed the results from the time of this admission to 2023 08:05 EST and agree with these findings:  [x]  Laboratory  [x]  Microbiology  []  Radiology  []  EKG/Telemetry   []  Cardiology/Vascular   []  Pathology  []  Old records  []  Other:      Assessment & Plan   Assessment / Plan     Brief Patient Summary:  Tate Kelley is a 73 y.o. male status post left URS, laser lithotripsy, and stent insertion, 2023 with postoperative sepsis of urinary origin    Active Hospital Problems:  Active Hospital Problems    Diagnosis     **Ureterolithiasis     Sepsis      Plan:   Fever yesterday    Labs reviewed, leukocytosis to 16.9 from 21.49; creatinine to 1.84 from 2.05    Void trial tomorrow    Maintain stent  Appreciate hospitalist service in the care of this patient  Will follow    Electronically signed by Sarah Palacios MD, 23, 8:07 AM EST.

## 2023-11-09 NOTE — PLAN OF CARE
Goal Outcome Evaluation:  Plan of Care Reviewed With: patient        Progress: no change  Outcome Evaluation: Patient presents with deficits in balance, endurance, and transfers. Patient will benefit from skilled PT services to address these mobility deficits and decrease risk of falls.      Anticipated Discharge Disposition (PT): sub acute care setting, home with home health

## 2023-11-09 NOTE — THERAPY EVALUATION
"Acute Care - Physical Therapy Initial Evaluation   [place]     Patient Name: Tate Kelley  : 1950  MRN: [id number]  Today's Date: 2023      Visit Dx:     ICD-10-CM ICD-9-CM   1. Difficulty walking  R26.2 719.7   2. Ureterolithiasis  N20.1 592.1     Patient Active Problem List   Diagnosis    DM2 (diabetes mellitus, type 2)    Flesh-eating bacteria    Heart attack    GI bleed    Stroke    Uric acid kidney stone    Nephrolithiasis    Hypertension    Localized edema    Persistent proteinuria    Stage 3b chronic kidney disease    Ureterolithiasis    Sepsis     Past Medical History:   Diagnosis Date    Anesthesia     \"TROUBLE COMING OUT OF ANESTHESIA\" PT STATES HE HAS TROUBLE BREATHING    Arthritis     NECK SPINE    COPD (chronic obstructive pulmonary disease)     NO MEDS/INHALERS, SOA E    Coronary artery disease     NO STENTS/BYPASSES DONE    DDD (degenerative disc disease), cervical     DM2 (diabetes mellitus, type 2)     ORAL MEDS/CHECKS BS, AVE     Flesh-eating bacteria     L LEG LED TO AMPUTATION NO CURRENT ISSUES    GERD (gastroesophageal reflux disease)     Heart attack     MI 2016, CLEARED BY CARDIOLOGY, PCP ([facility]    Hyperlipidemia     Rosacea     Seasonal allergies     Sleep apnea     USES CPAP    Stroke (cerebrum)      AFTER SEPSIS, WEAK L ARM    Tachycardia     NO MEDS CURRENTLY ASYMPTOMATIC, DENIES CP, SOAE    Uric acid kidney stone 2019     Past Surgical History:   Procedure Laterality Date    ABDOMINAL SURGERY      UMBILICAL HERNIA REPAIR     AMPUTATION      LBN    COLONOSCOPY      CYSTOSCOPY URETEROSCOPY Left 2021    Procedure: CYSTOSCOPY URETEROSCOPY RETROGRADE PYELOGRAM STENT EXCHANGE LEFT;  Surgeon: Sarah Palacios MD;  Location: [facility];  Service: Urology;  Laterality: Left;    CYSTOSCOPY W/ URETERAL STENT PLACEMENT Left 2023    Procedure: CYSTOSCOPY URETERAL CATHETER/STENT INSERTION;  Surgeon: Brayden Medrano MD;  " Location: [facility] MAIN OR;  Service: Urology;  Laterality: Left;    ENDOSCOPY      FOOT SURGERY Right     CARTLEGE REPAIR    KIDNEY STONE SURGERY      STENT INPLACED    KNEE SURGERY      ARTHROSCOPE    MOUTH SURGERY      TEETH EXTRACTED    URETEROSCOPY LASER LITHOTRIPSY WITH STENT INSERTION Left 11/7/2023    Procedure: CYSTOSCOPY URETEROSCOPY RETROGRADE PYELOGRAM HOLMIUM LASER STENT EXCHANGE  left;  Surgeon: Sarah Palacios MD;  Location: [facility] MAIN OR;  Service: Urology;  Laterality: Left;     PT Assessment (last 12 hours)       PT Evaluation and Treatment       Row Name 11/09/23 1400          Physical Therapy Time and Intention    Document Type evaluation  -AV     Mode of Treatment individual therapy;physical therapy  -AV       Row Name 11/09/23 1400          General Information    Patient Profile Reviewed yes  -AV     Prior Level of Function --  (I) with ADLs and squat pivot transfers to motorized w/c. Reports spouse can assist if needed. No home O2. Hx left AKA  -AV     Equipment Currently Used at Home wheelchair, motorized;ramp  -AV     Existing Precautions/Restrictions fall  -AV       Row Name 11/09/23 1400          Living Environment    Current Living Arrangements home  -AV     Home Accessibility stairs to enter home  -AV     People in Home spouse  -AV       Row Name 11/09/23 1400          Home Main Entrance    Number of Stairs, Main Entrance other (see comments)  Ramp  -AV       Row Name 11/09/23 1400          Range of Motion (ROM)    Range of Motion bilateral lower extremities;ROM is WFL  -AV       Row Name 11/09/23 1400          Strength (Manual Muscle Testing)    Strength (Manual Muscle Testing) right lower extremity strength  -AV     Right Lower Extremity Strength hip;knee;ankle  -AV     Hip, Right (Strength) 2+/5  -AV     Knee, Right (Strength) 4/5  -AV     Ankle, Right (Strength) 3+/5  -AV       Row Name 11/09/23 1400          Bed Mobility    Comment, (Bed Mobility) Patient seated edge of bed upon  entry. Reports transferring to EOB (I).  -AV       Row Name 11/09/23 1400          Safety Issues, Functional Mobility    Impairments Affecting Function (Mobility) balance;endurance/activity tolerance;shortness of breath;pain  -AV       Row Name 11/09/23 1400          Balance    Balance Assessment sitting dynamic balance  -AV     Dynamic Sitting Balance standby assist  -AV     Position, Sitting Balance unsupported;sitting edge of bed  -AV       Row Name             Wound 11/07/23 1356 urethral meatus Other (comment)    Wound - Properties Group Placement Date: 11/07/23  -LB Placement Time: 1356  -LB Present on Original Admission: N  -LB Location: urethral meatus  -LB Primary Wound Type: Other  -LB, point of insertion (ureteroscopy)     Retired Wound - Properties Group Placement Date: 11/07/23  -LB Placement Time: 1356  -LB Present on Original Admission: N  -LB Location: urethral meatus  -LB Primary Wound Type: Other  -LB, point of insertion (ureteroscopy)     Retired Wound - Properties Group Date first assessed: 11/07/23  -LB Time first assessed: 1356  -LB Present on Original Admission: N  -LB Location: urethral meatus  -LB Primary Wound Type: Other  -LB, point of insertion (ureteroscopy)       Row Name             Wound    Wound - Properties Group Location: --  -EG    Retired Wound - Properties Group Location: --  -EG    Retired Wound - Properties Group Location: --  -EG      Row Name 11/09/23 1400          Plan of Care Review    Plan of Care Reviewed With patient  -AV     Progress no change  -AV     Outcome Evaluation Patient presents with deficits in balance, endurance, and transfers. Patient will benefit from skilled PT services to address these mobility deficits and decrease risk of falls.  -AV       Row Name 11/09/23 1400          Therapy Assessment/Plan (PT)    Rehab Potential (PT) good, to achieve stated therapy goals  -AV     Criteria for Skilled Interventions Met (PT) yes;meets criteria  -AV     Therapy  Frequency (PT) daily  -AV     Predicted Duration of Therapy Intervention (PT) 10 days  -AV     Problem List (PT) problems related to;balance;mobility;strength  -AV     Activity Limitations Related to Problem List (PT) unable to transfer safely;unable to ambulate safely  -AV       Row Name 11/09/23 1400          PT Evaluation Complexity    History, PT Evaluation Complexity 1-2 personal factors and/or comorbidities  -AV     Examination of Body Systems (PT Eval Complexity) total of 4 or more elements  -AV     Clinical Presentation (PT Evaluation Complexity) stable  -AV     Clinical Decision Making (PT Evaluation Complexity) low complexity  -AV     Overall Complexity (PT Evaluation Complexity) low complexity  -AV       Row Name 11/09/23 1400          Therapy Plan Review/Discharge Plan (PT)    Therapy Plan Review (PT) evaluation/treatment results reviewed;patient  -AV       Row Name 11/09/23 1400          Physical Therapy Goals    Bed Mobility Goal Selection (PT) bed mobility, PT goal 1  -AV     Transfer Goal Selection (PT) transfer, PT goal 1  -AV       Row Name 11/09/23 1400          Bed Mobility Goal 1 (PT)    Activity/Assistive Device (Bed Mobility Goal 1, PT) sit to supine/supine to sit  -AV     [place] Level/Cues Needed (Bed Mobility Goal 1, PT) independent  -AV     Time Frame (Bed Mobility Goal 1, PT) 10 days  -AV       Row Name 11/09/23 1400          Transfer Goal 1 (PT)    Activity/Assistive Device (Transfer Goal 1, PT) sit-to-stand/stand-to-sit;bed-to-chair/chair-to-bed  -AV     [place] Level/Cues Needed (Transfer Goal 1, PT) standby assist  -AV     Time Frame (Transfer Goal 1, PT) 10 days  -AV               User Key  (r) = Recorded By, (t) = Taken By, (c) = Cosigned By      Initials Name Provider Type    EG Gina Bahena, RN Registered Nurse    LB Ness Lemus RN Registered Nurse    AV Jethro Otoole, PT Physical Therapist                    Physical Therapy Education       Title: PT OT SLP  Therapies (In Progress)       Topic: Physical Therapy (In Progress)       Point: Mobility training (Done)       Learning Progress Summary             Patient Acceptance, E,TB, VU by AV at 11/9/2023 1432                         Point: Home exercise program (Not Started)       Learner Progress:  Not documented in this visit.              Point: Body mechanics (Done)       Learning Progress Summary             Patient Acceptance, E,TB, VU by AV at 11/9/2023 1432                         Point: Precautions (Done)       Learning Progress Summary             Patient Acceptance, E,TB, VU by AV at 11/9/2023 1432                                         User Key       Initials Effective Dates Name Provider Type Discipline    AV 06/11/21 -  Jethro Otoole, PT Physical Therapist PT                  PT Recommendation and Plan  Anticipated Discharge Disposition (PT): sub acute care setting, home with home health  Planned Therapy Interventions (PT): balance training, bed mobility training, home exercise program, neuromuscular re-education, strengthening, transfer training  Therapy Frequency (PT): daily  Plan of Care Reviewed With: patient  Progress: no change  Outcome Evaluation: Patient presents with deficits in balance, endurance, and transfers. Patient will benefit from skilled PT services to address these mobility deficits and decrease risk of falls.   Outcome Measures       Row Name 11/09/23 1400             How much help from another person do you currently need...    Turning from your back to your side while in flat bed without using bedrails? 4  -AV      Moving from lying on back to sitting on the side of a flat bed without bedrails? 3  -AV      Moving to and from a bed to a chair (including a wheelchair)? 2  -AV      Standing up from a chair using your arms (e.g., wheelchair, bedside chair)? 2  -AV      Climbing 3-5 steps with a railing? 1  -AV      To walk in hospital room? 1  -AV      AM-PAC 6 Clicks Score (PT) 13   -AV      Highest level of mobility 4 --> Transferred to chair/commode  -AV         Functional Assessment    Outcome Measure Options AM-PAC 6 Clicks Basic Mobility (PT)  -AV                User Key  (r) = Recorded By, (t) = Taken By, (c) = Cosigned By      Initials Name Provider Type    Jethro Medrano, PT Physical Therapist                     Time Calculation:    PT Charges       Row Name 11/09/23 1430             Time Calculation    PT Received On 11/09/23  -AV      PT Goal Re-Cert Due Date 11/18/23  -AV         Untimed Charges    PT Eval/Re-eval Minutes 32  -AV         Total Minutes    Untimed Charges Total Minutes 32  -AV       Total Minutes 32  -AV                User Key  (r) = Recorded By, (t) = Taken By, (c) = Cosigned By      Initials Name Provider Type    Jethro Medrano, BLACK Physical Therapist                  Therapy Charges for Today       Code Description Service Date Service Provider Modifiers Qty    [id number] HC PT EVAL LOW COMPLEXITY 3 11/9/2023 Jethro Otoole, PT GP 1            PT G-Codes  Outcome Measure Options: AM-PAC 6 Clicks Basic Mobility (PT)  AM-PAC 6 Clicks Score (PT): 13    Jethro Otoole, PT  11/9/2023

## 2023-11-10 LAB
ALBUMIN SERPL-MCNC: 3.5 G/DL (ref 3.5–5.2)
ANION GAP SERPL CALCULATED.3IONS-SCNC: 12.8 MMOL/L (ref 5–15)
BASOPHILS # BLD AUTO: 0.08 10*3/MM3 (ref 0–0.2)
BASOPHILS NFR BLD AUTO: 0.6 % (ref 0–1.5)
BUN SERPL-MCNC: 24 MG/DL (ref 8–23)
BUN/CREAT SERPL: 17 (ref 7–25)
CALCIUM SPEC-SCNC: 9.4 MG/DL (ref 8.6–10.5)
CHLORIDE SERPL-SCNC: 100 MMOL/L (ref 98–107)
CO2 SERPL-SCNC: 22.2 MMOL/L (ref 22–29)
CREAT SERPL-MCNC: 1.41 MG/DL (ref 0.76–1.27)
DEPRECATED RDW RBC AUTO: 44.5 FL (ref 37–54)
EGFRCR SERPLBLD CKD-EPI 2021: 52.9 ML/MIN/1.73
EOSINOPHIL # BLD AUTO: 0.27 10*3/MM3 (ref 0–0.4)
EOSINOPHIL NFR BLD AUTO: 2.1 % (ref 0.3–6.2)
ERYTHROCYTE [DISTWIDTH] IN BLOOD BY AUTOMATED COUNT: 13.6 % (ref 12.3–15.4)
GLUCOSE BLDC GLUCOMTR-MCNC: 166 MG/DL (ref 70–99)
GLUCOSE BLDC GLUCOMTR-MCNC: 207 MG/DL (ref 70–99)
GLUCOSE BLDC GLUCOMTR-MCNC: 216 MG/DL (ref 70–99)
GLUCOSE BLDC GLUCOMTR-MCNC: 227 MG/DL (ref 70–99)
GLUCOSE BLDC GLUCOMTR-MCNC: 258 MG/DL (ref 70–99)
GLUCOSE SERPL-MCNC: 205 MG/DL (ref 65–99)
HCT VFR BLD AUTO: 45.8 % (ref 37.5–51)
HGB BLD-MCNC: 14.8 G/DL (ref 13–17.7)
IMM GRANULOCYTES # BLD AUTO: 0.17 10*3/MM3 (ref 0–0.05)
IMM GRANULOCYTES NFR BLD AUTO: 1.3 % (ref 0–0.5)
LYMPHOCYTES # BLD AUTO: 2.79 10*3/MM3 (ref 0.7–3.1)
LYMPHOCYTES NFR BLD AUTO: 21.9 % (ref 19.6–45.3)
MCH RBC QN AUTO: 28.7 PG (ref 26.6–33)
MCHC RBC AUTO-ENTMCNC: 32.3 G/DL (ref 31.5–35.7)
MCV RBC AUTO: 88.8 FL (ref 79–97)
MONOCYTES # BLD AUTO: 0.47 10*3/MM3 (ref 0.1–0.9)
MONOCYTES NFR BLD AUTO: 3.7 % (ref 5–12)
NEUTROPHILS NFR BLD AUTO: 70.4 % (ref 42.7–76)
NEUTROPHILS NFR BLD AUTO: 8.95 10*3/MM3 (ref 1.7–7)
NRBC BLD AUTO-RTO: 0 /100 WBC (ref 0–0.2)
PHOSPHATE SERPL-MCNC: 2.1 MG/DL (ref 2.5–4.5)
PHOSPHATE SERPL-MCNC: 2.5 MG/DL (ref 2.5–4.5)
PLATELET # BLD AUTO: 112 10*3/MM3 (ref 140–450)
PMV BLD AUTO: 10.4 FL (ref 6–12)
POTASSIUM SERPL-SCNC: 4.2 MMOL/L (ref 3.5–5.2)
PROCALCITONIN SERPL-MCNC: 33.88 NG/ML (ref 0–0.25)
QT INTERVAL: 334 MS
QTC INTERVAL: 511 MS
RBC # BLD AUTO: 5.16 10*6/MM3 (ref 4.14–5.8)
SODIUM SERPL-SCNC: 135 MMOL/L (ref 136–145)
VANCOMYCIN SERPL-MCNC: 7.03 MCG/ML (ref 5–40)
WBC NRBC COR # BLD: 12.73 10*3/MM3 (ref 3.4–10.8)

## 2023-11-10 PROCEDURE — 94760 N-INVAS EAR/PLS OXIMETRY 1: CPT

## 2023-11-10 PROCEDURE — 94799 UNLISTED PULMONARY SVC/PX: CPT

## 2023-11-10 PROCEDURE — 85025 COMPLETE CBC W/AUTO DIFF WBC: CPT | Performed by: INTERNAL MEDICINE

## 2023-11-10 PROCEDURE — 82948 REAGENT STRIP/BLOOD GLUCOSE: CPT

## 2023-11-10 PROCEDURE — 25010000002 VANCOMYCIN 5 G RECONSTITUTED SOLUTION

## 2023-11-10 PROCEDURE — 0 DEXTROSE 5 % SOLUTION: Performed by: INTERNAL MEDICINE

## 2023-11-10 PROCEDURE — 99231 SBSQ HOSP IP/OBS SF/LOW 25: CPT | Performed by: UROLOGY

## 2023-11-10 PROCEDURE — 94664 DEMO&/EVAL PT USE INHALER: CPT

## 2023-11-10 PROCEDURE — 84100 ASSAY OF PHOSPHORUS: CPT | Performed by: INTERNAL MEDICINE

## 2023-11-10 PROCEDURE — 84145 PROCALCITONIN (PCT): CPT | Performed by: INTERNAL MEDICINE

## 2023-11-10 PROCEDURE — 80202 ASSAY OF VANCOMYCIN: CPT

## 2023-11-10 PROCEDURE — 25810000003 SODIUM CHLORIDE 0.9 % SOLUTION

## 2023-11-10 PROCEDURE — 63710000001 INSULIN LISPRO (HUMAN) PER 5 UNITS: Performed by: INTERNAL MEDICINE

## 2023-11-10 PROCEDURE — 80069 RENAL FUNCTION PANEL: CPT | Performed by: INTERNAL MEDICINE

## 2023-11-10 RX ORDER — SODIUM PHOSPHATE IN D5W 15MMOL/250
15 PLASTIC BAG, INJECTION (ML) INTRAVENOUS ONCE
Status: COMPLETED | OUTPATIENT
Start: 2023-11-10 | End: 2023-11-10

## 2023-11-10 RX ADMIN — INSULIN LISPRO 4 UNITS: 100 INJECTION, SOLUTION INTRAVENOUS; SUBCUTANEOUS at 20:46

## 2023-11-10 RX ADMIN — VANCOMYCIN HYDROCHLORIDE 1000 MG: 500 INJECTION, POWDER, LYOPHILIZED, FOR SOLUTION INTRAVENOUS at 04:56

## 2023-11-10 RX ADMIN — Medication 10 ML: at 08:41

## 2023-11-10 RX ADMIN — INSULIN LISPRO 2 UNITS: 100 INJECTION, SOLUTION INTRAVENOUS; SUBCUTANEOUS at 08:40

## 2023-11-10 RX ADMIN — VANCOMYCIN HYDROCHLORIDE 1000 MG: 500 INJECTION, POWDER, LYOPHILIZED, FOR SOLUTION INTRAVENOUS at 16:38

## 2023-11-10 RX ADMIN — INSULIN LISPRO 3 UNITS: 100 INJECTION, SOLUTION INTRAVENOUS; SUBCUTANEOUS at 17:17

## 2023-11-10 RX ADMIN — Medication 10 ML: at 20:46

## 2023-11-10 RX ADMIN — TAMSULOSIN HYDROCHLORIDE 0.4 MG: 0.4 CAPSULE ORAL at 08:40

## 2023-11-10 RX ADMIN — SODIUM PHOSPHATE, MONOBASIC, MONOHYDRATE AND SODIUM PHOSPHATE, DIBASIC, ANHYDROUS 15 MMOL: 276; 142 INJECTION, SOLUTION INTRAVENOUS at 08:41

## 2023-11-10 RX ADMIN — ARFORMOTEROL TARTRATE 15 MCG: 15 SOLUTION RESPIRATORY (INHALATION) at 09:07

## 2023-11-10 RX ADMIN — FAMOTIDINE 20 MG: 20 TABLET, FILM COATED ORAL at 20:46

## 2023-11-10 RX ADMIN — ARFORMOTEROL TARTRATE 15 MCG: 15 SOLUTION RESPIRATORY (INHALATION) at 18:46

## 2023-11-10 RX ADMIN — ACETAMINOPHEN 650 MG: 325 TABLET ORAL at 04:22

## 2023-11-10 RX ADMIN — BUDESONIDE 0.5 MG: 0.5 INHALANT RESPIRATORY (INHALATION) at 09:07

## 2023-11-10 RX ADMIN — BUDESONIDE 0.5 MG: 0.5 INHALANT RESPIRATORY (INHALATION) at 18:46

## 2023-11-10 RX ADMIN — INSULIN LISPRO 3 UNITS: 100 INJECTION, SOLUTION INTRAVENOUS; SUBCUTANEOUS at 12:23

## 2023-11-10 NOTE — PLAN OF CARE
Problem: Adult Inpatient Plan of Care  Goal: Plan of Care Review  Outcome: Ongoing, Progressing  Flowsheets (Taken 11/10/2023 1513)  Outcome Evaluation: Resting in bed. Able to use urinal independently. Passing gas. PVR low.  Goal: Patient-Specific Goal (Individualized)  Outcome: Ongoing, Progressing  Goal: Absence of Hospital-Acquired Illness or Injury  Outcome: Ongoing, Progressing  Intervention: Identify and Manage Fall Risk  Recent Flowsheet Documentation  Taken 11/10/2023 0921 by Alba Mena RN  Safety Promotion/Fall Prevention: safety round/check completed  Goal: Optimal Comfort and Wellbeing  Outcome: Ongoing, Progressing  Intervention: Provide Person-Centered Care  Recent Flowsheet Documentation  Taken 11/10/2023 0921 by Alba Mena RN  Trust Relationship/Rapport:   care explained   choices provided  Goal: Readiness for Transition of Care  Outcome: Ongoing, Progressing     Problem: Fall Injury Risk  Goal: Absence of Fall and Fall-Related Injury  Outcome: Ongoing, Progressing  Intervention: Promote Injury-Free Environment  Recent Flowsheet Documentation  Taken 11/10/2023 0921 by Alba Mena RN  Safety Promotion/Fall Prevention: safety round/check completed     Problem: Skin Injury Risk Increased  Goal: Skin Health and Integrity  Outcome: Ongoing, Progressing     Problem: Adjustment to Illness (Sepsis/Septic Shock)  Goal: Optimal Coping  Outcome: Ongoing, Progressing     Problem: Bleeding (Sepsis/Septic Shock)  Goal: Absence of Bleeding  Outcome: Ongoing, Progressing     Problem: Glycemic Control Impaired (Sepsis/Septic Shock)  Goal: Blood Glucose Level Within Desired Range  Outcome: Ongoing, Progressing     Problem: Infection Progression (Sepsis/Septic Shock)  Goal: Absence of Infection Signs and Symptoms  Outcome: Ongoing, Progressing     Problem: Nutrition Impaired (Sepsis/Septic Shock)  Goal: Optimal Nutrition Intake  Outcome: Ongoing, Progressing   Goal Outcome Evaluation:               Outcome Evaluation: Resting in bed. Able to use urinal independently. Passing gas. PVR low.

## 2023-11-10 NOTE — PROGRESS NOTES
[facility]   Urology Progress Note    Patient Name: Tate Kelley  : 1950  MRN: [id number]  Primary Care Physician:  Cassie Osuna PA  Date of admission: 2023    Subjective   Subjective     Feels better, improved shortness of air      Objective   Objective     Vitals:   Temp:  [97.3 °F (36.3 °C)-98.5 °F (36.9 °C)] 97.4 °F (36.3 °C)  Heart Rate:  [] 92  Resp:  [18-22] 18  BP: (112-137)/(61-81) 125/68  Flow (L/min):  [2.5] 2.5  Physical Exam     Alert and oriented x3  No acute distress  Unlabored respirations  Nontender/nondistended  Catheter in place, clear urine       Result Review    Result Review:  I have personally reviewed the results from the time of this admission to 11/10/2023 09:37 EST and agree with these findings:  [x]  Laboratory  [x]  Microbiology  []  Radiology  []  EKG/Telemetry   []  Cardiology/Vascular   []  Pathology  []  Old records  []  Other:      Assessment & Plan   Assessment / Plan     Brief Patient Summary:  Tate Kelley is a 73 y.o. male status post left URS, laser lithotripsy, and stent insertion, 2023 with postoperative sepsis of urinary origin    Active Hospital Problems:  Active Hospital Problems    Diagnosis     **Ureterolithiasis     Sepsis      Plan:   Labs reviewed, improving,repeat blood ctx pending  Continue antibiotic    Void trial    Maintain stent  Appreciate hospitalist service in the care of this patient    Office to schedule patient for OR for stent removal in a couple weeks    I will be back on service Monday should patient remain in house, plan to see then unless needed earlier;  Patient aware    Questions addressed

## 2023-11-10 NOTE — PROGRESS NOTES
"[facility] Clinical Pharmacy Services: Vancomycin Monitoring Note    Tate Kelley is a 73 y.o. male who is on day  of pharmacy to dose vancomycin for Sepsis.    Previous Vancomycin Dose: 1000 mg IV every 24 hours  Imaging Reviewed?: Yes  Updated Cultures and Sensitivities:   /: Blood - Staph species, not aureus or lugdunensis  /: Blood - Staph species, not aureus or lugdunensis  11/3: Urine - coag negative staph    Vitals/Labs  Ht: 185.4 cm (73\"); Wt: (!) 161 kg (354 lb 11.5 oz)   Temp (24hrs), Av.8 °F (36.6 °C), Min:97.3 °F (36.3 °C), Max:98.5 °F (36.9 °C)   Estimated Creatinine Clearance: 75 mL/min (A) (by C-G formula based on SCr of 1.41 mg/dL (H)).     Results from last 7 days   Lab Units 11/10/23  0444 23  0351 23  0528   VANCOMYCIN RM mcg/mL 7.03  --   --    CREATININE mg/dL 1.41* 1.84* 2.05*   WBC 10*3/mm3 12.73* 16.90* 21.49*     Assessment/Plan    Current Vancomycin Dose:  1000 mg IV every 12 hours; which provides the following predicted parameters:  AUC24,ss: 577 mg/L.hr  PAUC*: 93 %  Ctrough,ss: 15.5 mg/L  Pconc*: 4 %  Tox.: 11 %  Next Vanc Random ordered for  at 1300  We will continue to monitor patient changes and renal function     Thank you for involving pharmacy in this patient's care. Please contact pharmacy with any questions or concerns.    Daja Oswald  Clinical Pharmacist    "

## 2023-11-10 NOTE — PROGRESS NOTES
[facility]   Hospitalist Progress Note  Date: 11/10/2023  Patient Name: Tate Kelley  : 1950  MRN: [id number]  Date of admission: 2023      Subjective   Subjective     Chief Complaint: Tachycardic and febrile after left ureteral stone lithotripsy and stent exchange on     Summary:   Tate Kelley is a 73 y.o. male past medical history of obesity with BMI of 45, nephrolithiasis, type 2 diabetes, BPH, and hypertension who initially came in for cystoscopy with stent exchange and same-day surgery who developed fever and tachycardia so was taken to the PACU     History is obtained from the patient and the urologist.  Patient has been in and out of the OR multiple times for stent removals.  Today he seemed to be relatively well postoperatively.  However he developed fever, tachycardia, and rigors.  At this point he was moved to the PACU for further management and close monitoring.  Urology requested admission from internal medicine due to concern for possible sepsis.     In the PACU the patient's vital signs were as follows: Temperature was 99.5, pulse 150, respiratory rate was in the 20s to 30s, blood pressure 152/69, satting well on facemask.  CBC shows no significant abnormalities.  CMP shows a mild bump in his creatinine from 1.3->1.4.  ABG is 7.4/29/65.3 with a lactate of 3.7.  Initially, I was told his temperature was 105 and I was concern for NMS but upon seeing the patient clinically has temperature was actually 99.  He was not given dantrolene because there was no concern for NMS at that time although it was considered.  I asked the PACU to give the patient 2 L of lactated Ringer's.  We will dose with Zosyn and vancomycin.  Although initially the patient seem to be in sinus tachycardia on the monitor he was appearing to be in SVT which will be addressed once he gets to the ICU.  Patient will be admitted to the ICU due to sepsis from urinary source.  Patient stabilized  and transfer out of the unit.  Abebe catheter DC'd with good voiding.    Interval Followup:   Afebrile.  Now on room air.  Patient does not use oxygen at home.  Remains on home CPAP.  Complaining of tremors.  Getting nebulizer treatment  No more shortness of air.  No chest pain.    Heart rate better   Complaining of occasional headache.  Abebe catheter DC'd.  Has voided x3..    Review of Systems   All systems were reviewed and negative except for: Summary and interval follow-up    Objective   Objective     Vitals:   Temp:  [97.4 °F (36.3 °C)-98.5 °F (36.9 °C)] 98.1 °F (36.7 °C)  Heart Rate:  [] 95  Resp:  [18-22] 18  BP: (118-137)/(64-81) 136/74  Flow (L/min):  [2.5] 2.5  Physical Exam        Constitutional: Mild distress.              Eyes: Pupils equal, sclerae anicteric, no conjunctival injection              HENT: NCAT, mucous membranes moist              Neck: Supple, no thyromegaly, no lymphadenopathy, trachea midline              Respiratory: Clear to auscultation bilaterally, nonlabored respirations               Cardiovascular: RRR, no murmurs, rubs, or gallops, palpable pedal pulses bilaterally              Gastrointestinal: Positive bowel sounds, soft, nontender, nondistended              Musculoskeletal: Leg shows trace to 1+ pitting edema with chronic changes.  Left leg is amputated.  Left above-knee amputation              Psychiatric: Appropriate affect, cooperative              Neurologic: Oriented x 3, strength symmetric in all extremities, Cranial Nerves grossly intact to confrontation, speech clear              Skin: No rashes.  Chronic stasis changes right lower extremity    Result Review    Result Review:  I have personally reviewed the results for the past 24 hours and agree with these findings:  [x]  Laboratory  [x]  Microbiology  [x]  Radiology  [x]  EKG/Telemetry sinus tachycardia at 136.  Right bundle block.  QT interval 0.43  []  Cardiology/Vascular   []  Pathology  [x]  Old  records  [x]  Other: Medications    Assessment & Plan   Assessment / Plan     Assessment:  Sepsis present on admission.  Leukocytosis, tachycardia, fever and elevated procalcitonin.  UTI.  November 8 th blood culture positive for staph .  Question contamination  Recurrent nephrolithiasis with a left ureteric stone.  S/p lithotripsy and stent exchange on November 7.  Hematuria due to above.  S/p CBI.  Improved.  Morbid obesity BMI 46.8.  Diabetes mellitus with hyperglycemia.  Obstructive sleep apnea on home CPAP.  Acute renal insufficiency on chronic kidney disease stage IIIa with baseline creatinine 1.4-1.6.  Improving  BPH.  Hypertension.  Right bundle branch block.  Hypomagnesemia.  Supplemented  History of CVA.    Plan:    Bronchodilator and bronchopulmonary hygiene protocol.  DuoNeb, Pulmicort brovana neb.  Repeat chest x-ray November 9 left lower lobe basal opacity.  BNP -on November 7  Trend procalcitonin.  Improving  IV  Vanco.  Await culture data.  Wean off oxygen.  Flomax.  Off Abebe catheter as per urology.  Trial of voiding .  Appreciate intensivist input.  Appreciate urology input.  Cleared by urology  As needed IV Lopressor and hydralazine.  Sliding-scale insulin.  Continue home CPAP.  Monitor and replace electrolytes    PT OT.  Patient does not want inpatient rehab  Discussed plan with RN and .  Disposition as per urology.  Likely discharge home November 12.    DVT prophylaxis:  Mechanical DVT prophylaxis orders are present.    CODE STATUS:   Level Of Support Discussed With: Patient  Code Status (Patient has no pulse and is not breathing): CPR (Attempt to Resuscitate)  Medical Interventions (Patient has pulse or is breathing): Full Support      Part of this note may be an electronic transcription/translation of spoken language to printed text using the Dragon Dictation System.         Electronically signed by Dg Chowdhury MD, 11/10/23, 4:43 PM EST.

## 2023-11-11 LAB
ALBUMIN SERPL-MCNC: 3.3 G/DL (ref 3.5–5.2)
ANION GAP SERPL CALCULATED.3IONS-SCNC: 9.9 MMOL/L (ref 5–15)
BUN SERPL-MCNC: 24 MG/DL (ref 8–23)
BUN/CREAT SERPL: 17.9 (ref 7–25)
CALCIUM SPEC-SCNC: 9.1 MG/DL (ref 8.6–10.5)
CHLORIDE SERPL-SCNC: 101 MMOL/L (ref 98–107)
CO2 SERPL-SCNC: 24.1 MMOL/L (ref 22–29)
CREAT SERPL-MCNC: 1.34 MG/DL (ref 0.76–1.27)
DEPRECATED RDW RBC AUTO: 44.4 FL (ref 37–54)
EGFRCR SERPLBLD CKD-EPI 2021: 56.3 ML/MIN/1.73
EOSINOPHIL # BLD MANUAL: 0.62 10*3/MM3 (ref 0–0.4)
EOSINOPHIL NFR BLD MANUAL: 6 % (ref 0.3–6.2)
ERYTHROCYTE [DISTWIDTH] IN BLOOD BY AUTOMATED COUNT: 13.6 % (ref 12.3–15.4)
GLUCOSE BLDC GLUCOMTR-MCNC: 224 MG/DL (ref 70–99)
GLUCOSE BLDC GLUCOMTR-MCNC: 253 MG/DL (ref 70–99)
GLUCOSE BLDC GLUCOMTR-MCNC: 271 MG/DL (ref 70–99)
GLUCOSE BLDC GLUCOMTR-MCNC: 291 MG/DL (ref 70–99)
GLUCOSE SERPL-MCNC: 258 MG/DL (ref 65–99)
HCT VFR BLD AUTO: 41.7 % (ref 37.5–51)
HGB BLD-MCNC: 13.5 G/DL (ref 13–17.7)
LYMPHOCYTES # BLD MANUAL: 2.06 10*3/MM3 (ref 0.7–3.1)
LYMPHOCYTES NFR BLD MANUAL: 3 % (ref 5–12)
MACROCYTES BLD QL SMEAR: ABNORMAL
MCH RBC QN AUTO: 28.6 PG (ref 26.6–33)
MCHC RBC AUTO-ENTMCNC: 32.4 G/DL (ref 31.5–35.7)
MCV RBC AUTO: 88.3 FL (ref 79–97)
MICROCYTES BLD QL: ABNORMAL
MONOCYTES # BLD: 0.31 10*3/MM3 (ref 0.1–0.9)
NEUTROPHILS # BLD AUTO: 6.89 10*3/MM3 (ref 1.7–7)
NEUTROPHILS NFR BLD MANUAL: 65 % (ref 42.7–76)
NEUTS BAND NFR BLD MANUAL: 2 % (ref 0–5)
OVALOCYTES BLD QL SMEAR: ABNORMAL
PHOSPHATE SERPL-MCNC: 2.4 MG/DL (ref 2.5–4.5)
PLATELET # BLD AUTO: 122 10*3/MM3 (ref 140–450)
PMV BLD AUTO: 10.4 FL (ref 6–12)
POLYCHROMASIA BLD QL SMEAR: ABNORMAL
POTASSIUM SERPL-SCNC: 3.8 MMOL/L (ref 3.5–5.2)
PROMYELOCYTES NFR BLD MANUAL: 4 % (ref 0–0)
RBC # BLD AUTO: 4.72 10*6/MM3 (ref 4.14–5.8)
SMALL PLATELETS BLD QL SMEAR: ABNORMAL
SODIUM SERPL-SCNC: 135 MMOL/L (ref 136–145)
VANCOMYCIN SERPL-MCNC: 12.4 MCG/ML (ref 5–40)
VARIANT LYMPHS NFR BLD MANUAL: 13 % (ref 19.6–45.3)
VARIANT LYMPHS NFR BLD MANUAL: 7 % (ref 0–5)
WBC MORPH BLD: NORMAL
WBC NRBC COR # BLD: 10.29 10*3/MM3 (ref 3.4–10.8)

## 2023-11-11 PROCEDURE — 63710000001 INSULIN LISPRO (HUMAN) PER 5 UNITS: Performed by: INTERNAL MEDICINE

## 2023-11-11 PROCEDURE — 94760 N-INVAS EAR/PLS OXIMETRY 1: CPT

## 2023-11-11 PROCEDURE — 94799 UNLISTED PULMONARY SVC/PX: CPT

## 2023-11-11 PROCEDURE — 80202 ASSAY OF VANCOMYCIN: CPT

## 2023-11-11 PROCEDURE — 0 DEXTROSE 5 % SOLUTION: Performed by: INTERNAL MEDICINE

## 2023-11-11 PROCEDURE — 85007 BL SMEAR W/DIFF WBC COUNT: CPT | Performed by: INTERNAL MEDICINE

## 2023-11-11 PROCEDURE — 80069 RENAL FUNCTION PANEL: CPT | Performed by: INTERNAL MEDICINE

## 2023-11-11 PROCEDURE — 82948 REAGENT STRIP/BLOOD GLUCOSE: CPT

## 2023-11-11 PROCEDURE — 25810000003 SODIUM CHLORIDE 0.9 % SOLUTION

## 2023-11-11 PROCEDURE — 25010000002 VANCOMYCIN 5 G RECONSTITUTED SOLUTION

## 2023-11-11 PROCEDURE — 85025 COMPLETE CBC W/AUTO DIFF WBC: CPT | Performed by: INTERNAL MEDICINE

## 2023-11-11 RX ORDER — SODIUM PHOSPHATE IN D5W 15MMOL/250
15 PLASTIC BAG, INJECTION (ML) INTRAVENOUS ONCE
Status: COMPLETED | OUTPATIENT
Start: 2023-11-11 | End: 2023-11-11

## 2023-11-11 RX ADMIN — SODIUM PHOSPHATE, MONOBASIC, MONOHYDRATE AND SODIUM PHOSPHATE, DIBASIC, ANHYDROUS 15 MMOL: 276; 142 INJECTION, SOLUTION INTRAVENOUS at 11:08

## 2023-11-11 RX ADMIN — INSULIN LISPRO 4 UNITS: 100 INJECTION, SOLUTION INTRAVENOUS; SUBCUTANEOUS at 12:23

## 2023-11-11 RX ADMIN — Medication 10 ML: at 20:43

## 2023-11-11 RX ADMIN — INSULIN LISPRO 4 UNITS: 100 INJECTION, SOLUTION INTRAVENOUS; SUBCUTANEOUS at 17:28

## 2023-11-11 RX ADMIN — FAMOTIDINE 20 MG: 20 TABLET, FILM COATED ORAL at 20:43

## 2023-11-11 RX ADMIN — VANCOMYCIN HYDROCHLORIDE 1250 MG: 500 INJECTION, POWDER, LYOPHILIZED, FOR SOLUTION INTRAVENOUS at 16:49

## 2023-11-11 RX ADMIN — BUDESONIDE 0.5 MG: 0.5 INHALANT RESPIRATORY (INHALATION) at 18:42

## 2023-11-11 RX ADMIN — BUDESONIDE 0.5 MG: 0.5 INHALANT RESPIRATORY (INHALATION) at 07:43

## 2023-11-11 RX ADMIN — ARFORMOTEROL TARTRATE 15 MCG: 15 SOLUTION RESPIRATORY (INHALATION) at 07:43

## 2023-11-11 RX ADMIN — ARFORMOTEROL TARTRATE 15 MCG: 15 SOLUTION RESPIRATORY (INHALATION) at 18:42

## 2023-11-11 RX ADMIN — VANCOMYCIN HYDROCHLORIDE 1000 MG: 500 INJECTION, POWDER, LYOPHILIZED, FOR SOLUTION INTRAVENOUS at 05:15

## 2023-11-11 RX ADMIN — INSULIN LISPRO 4 UNITS: 100 INJECTION, SOLUTION INTRAVENOUS; SUBCUTANEOUS at 20:49

## 2023-11-11 RX ADMIN — TAMSULOSIN HYDROCHLORIDE 0.4 MG: 0.4 CAPSULE ORAL at 08:16

## 2023-11-11 RX ADMIN — INSULIN LISPRO 3 UNITS: 100 INJECTION, SOLUTION INTRAVENOUS; SUBCUTANEOUS at 08:16

## 2023-11-11 RX ADMIN — ACETAMINOPHEN 650 MG: 325 TABLET ORAL at 23:22

## 2023-11-11 NOTE — PROGRESS NOTES
"[facility] Clinical Pharmacy Services: Vancomycin Monitoring Note    Tate Kelley is a 73 y.o. male who is on day 3/7 of pharmacy to dose vancomycin for Sepsis.    Previous Vancomycin Dose: 1000 mg IV every 24 hours  Imaging Reviewed?: Yes  Updated Cultures and Sensitivities:   /: Blood - Staph species, not aureus or lugdunensis  /: Blood - Staph species, not aureus or lugdunensis  11/3: Urine - coag negative staph    Vitals/Labs  Ht: 185.4 cm (73\"); Wt: (!) 161 kg (354 lb 11.5 oz)   Temp (24hrs), Av.1 °F (36.7 °C), Min:97.4 °F (36.3 °C), Max:98.7 °F (37.1 °C)   Estimated Creatinine Clearance: 78.9 mL/min (A) (by C-G formula based on SCr of 1.34 mg/dL (H)).     Results from last 7 days   Lab Units 23  1335 23  0448 11/10/23  0444 23  0351   VANCOMYCIN RM mcg/mL 12.40  --  7.03  --    CREATININE mg/dL  --  1.34* 1.41* 1.84*   WBC 10*3/mm3  --  10.29 12.73* 16.90*     Assessment/Plan    Current Vancomycin Dose:  1250 mg IV every 12 hours; which provides the following predicted parameters:  AUC24,ss: 472 mg/L.hr  PAUC*: 99 %  Ctrough,ss: 14.7 mg/L  Pconc*: 0 %  Tox.: 10 %  Next vanc random planned for  at 0600  We will continue to monitor patient changes and renal function     Thank you for involving pharmacy in this patient's care. Please contact pharmacy with any questions or concerns.    Daja Oswald  Clinical Pharmacist      "

## 2023-11-11 NOTE — PLAN OF CARE
Problem: Adult Inpatient Plan of Care  Goal: Plan of Care Review  Outcome: Ongoing, Progressing  Flowsheets (Taken 11/11/2023 1503)  Outcome Evaluation: Up to wheelchair for a couple hours. Blood sugar in the 200's. Family came to visit. MD stated possible discharge tomorrow.  Goal: Patient-Specific Goal (Individualized)  Outcome: Ongoing, Progressing  Goal: Absence of Hospital-Acquired Illness or Injury  Outcome: Ongoing, Progressing  Intervention: Identify and Manage Fall Risk  Recent Flowsheet Documentation  Taken 11/11/2023 0926 by Alba Mena RN  Safety Promotion/Fall Prevention: safety round/check completed  Intervention: Prevent Skin Injury  Recent Flowsheet Documentation  Taken 11/11/2023 0926 by Alba Mena RN  Body Position: position changed independently  Intervention: Prevent and Manage VTE (Venous Thromboembolism) Risk  Recent Flowsheet Documentation  Taken 11/11/2023 0926 by Alba Mena RN  Activity Management: activity encouraged  Goal: Optimal Comfort and Wellbeing  Outcome: Ongoing, Progressing  Intervention: Provide Person-Centered Care  Recent Flowsheet Documentation  Taken 11/11/2023 0926 by Alba Mena RN  Trust Relationship/Rapport:   care explained   choices provided  Goal: Readiness for Transition of Care  Outcome: Ongoing, Progressing     Problem: Fall Injury Risk  Goal: Absence of Fall and Fall-Related Injury  Outcome: Ongoing, Progressing  Intervention: Promote Injury-Free Environment  Recent Flowsheet Documentation  Taken 11/11/2023 0926 by Alba Mena RN  Safety Promotion/Fall Prevention: safety round/check completed     Problem: Skin Injury Risk Increased  Goal: Skin Health and Integrity  Outcome: Ongoing, Progressing     Problem: Adjustment to Illness (Sepsis/Septic Shock)  Goal: Optimal Coping  Outcome: Ongoing, Progressing     Problem: Bleeding (Sepsis/Septic Shock)  Goal: Absence of Bleeding  Outcome: Ongoing, Progressing     Problem: Glycemic Control  Impaired (Sepsis/Septic Shock)  Goal: Blood Glucose Level Within Desired Range  Outcome: Ongoing, Progressing     Problem: Infection Progression (Sepsis/Septic Shock)  Goal: Absence of Infection Signs and Symptoms  Outcome: Ongoing, Progressing  Intervention: Promote Recovery  Recent Flowsheet Documentation  Taken 11/11/2023 0926 by Alba Mena, RN  Activity Management: activity encouraged  Intervention: Promote Stabilization  Recent Flowsheet Documentation  Taken 11/11/2023 0926 by Alba Mena, RN  Fluid/Electrolyte Management: fluids provided     Problem: Nutrition Impaired (Sepsis/Septic Shock)  Goal: Optimal Nutrition Intake  Outcome: Ongoing, Progressing   Goal Outcome Evaluation:              Outcome Evaluation: Up to wheelchair for a couple hours. Blood sugar in the 200's. Family came to visit. MD stated possible discharge tomorrow.

## 2023-11-11 NOTE — PLAN OF CARE
Goal Outcome Evaluation:  Plan of Care Reviewed With: patient        Progress: improving  Outcome Evaluation: VSS. No complaint of pain. No complaint of SOA. On home cpap when sleeping. Voiding in urinal without difficulty. No bowel movement. BGL monitored. Insulin given per sliding scale. Compression stocking applied to RLE per patient request to assist in decreasing edema.

## 2023-11-11 NOTE — PROGRESS NOTES
[facility]   Hospitalist Progress Note  Date: 2023  Patient Name: Tate Kelley  : 1950  MRN: [id number]  Date of admission: 2023      Subjective   Subjective     Chief Complaint: Tachycardic and febrile after left ureteral stone lithotripsy and stent exchange on     Summary:   Tate Kelley is a 73 y.o. male past medical history of obesity with BMI of 45, nephrolithiasis, type 2 diabetes, BPH, and hypertension who initially came in for cystoscopy with stent exchange and same-day surgery who developed fever and tachycardia so was taken to the PACU     History is obtained from the patient and the urologist.  Patient has been in and out of the OR multiple times for stent removals.  Today he seemed to be relatively well postoperatively.  However he developed fever, tachycardia, and rigors.  At this point he was moved to the PACU for further management and close monitoring.  Urology requested admission from internal medicine due to concern for possible sepsis.     In the PACU the patient's vital signs were as follows: Temperature was 99.5, pulse 150, respiratory rate was in the 20s to 30s, blood pressure 152/69, satting well on facemask.  CBC shows no significant abnormalities.  CMP shows a mild bump in his creatinine from 1.3->1.4.  ABG is 7.4/29/65.3 with a lactate of 3.7.  Initially, I was told his temperature was 105 and I was concern for NMS but upon seeing the patient clinically has temperature was actually 99.  He was not given dantrolene because there was no concern for NMS at that time although it was considered.  I asked the PACU to give the patient 2 L of lactated Ringer's.  We will dose with Zosyn and vancomycin.  Although initially the patient seem to be in sinus tachycardia on the monitor he was appearing to be in SVT which will be addressed once he gets to the ICU.  Patient will be admitted to the ICU due to sepsis from urinary source.  Patient stabilized  and transfer out of the unit.  Abebe catheter DC'd with good voiding.    Interval Followup:   Remains on room air.  Patient does not use oxygen at home.  Remains on home CPAP most of the time.  Episode of dizziness last night after 20 minutes of taking Flomax.  tremors better.  Getting nebulizer treatment  No more shortness of air.  No chest pain.    Heart rate better   Complaining of occasional headache.  Voiding well.  Good urine output.  Off Abebe catheter  Regular BM.    Review of Systems   All systems were reviewed and negative except for: Summary and interval follow-up    Objective   Objective     Vitals:   Temp:  [97.4 °F (36.3 °C)-98.7 °F (37.1 °C)] 98.4 °F (36.9 °C)  Heart Rate:  [90-95] 90  Resp:  [16-20] 20  BP: (124-140)/(57-79) 140/77  Flow (L/min):  [2] 2  Physical Exam        Constitutional: Mild distress.              Eyes: Pupils equal, sclerae anicteric, no conjunctival injection              HENT: NCAT, mucous membranes moist              Neck: Supple, no thyromegaly, no lymphadenopathy, trachea midline              Respiratory: Clear to auscultation bilaterally, nonlabored respirations               Cardiovascular: RRR, no murmurs, rubs, or gallops, palpable pedal pulses bilaterally              Gastrointestinal: Positive bowel sounds, soft, nontender, nondistended              Musculoskeletal: Leg shows trace to 1+ pitting edema with chronic changes.  Left leg is amputated.  Left above-knee amputation              Psychiatric: Appropriate affect, cooperative              Neurologic: Oriented x 3, strength symmetric in all extremities, Cranial Nerves grossly intact to confrontation, speech clear              Skin: No rashes.  Chronic stasis changes right lower extremity    Result Review    Result Review:  I have personally reviewed the results for the past 24 hours and agree with these findings:  [x]  Laboratory  [x]  Microbiology  [x]  Radiology  [x]  EKG/Telemetry sinus tachycardia at 136.   Right bundle block.  QT interval 0.43  []  Cardiology/Vascular   []  Pathology  [x]  Old records  [x]  Other: Medications    Assessment & Plan   Assessment / Plan     Assessment:  Sepsis present on admission.  Leukocytosis, tachycardia, fever and elevated procalcitonin.  UTI.  November 8 th blood culture positive for staph .  Question contamination  Recurrent nephrolithiasis with a left ureteric stone.  S/p lithotripsy and stent exchange on November 7.  Hematuria due to above.  S/p CBI.  Improved.  Morbid obesity BMI 46.8.  Diabetes mellitus with hyperglycemia.  Obstructive sleep apnea on home CPAP.  Acute renal insufficiency on chronic kidney disease stage IIIa with baseline creatinine 1.4-1.6.  Improving  BPH.  Hypertension.  Right bundle branch block.  Hypomagnesemia.  Supplemented  History of CVA.  Thrombocytopenia.  Improving.    Plan:    Bronchodilator and bronchopulmonary hygiene protocol.  DuoNeb, Pulmicort brovana neb.  Repeat chest x-ray November 9 left lower lobe basal opacity.  BNP -on November 7  Trend procalcitonin.  Improving  IV  Vanco.  Await subsequent culture data.  off oxygen.  DC Flomax as got dizzy after taking it.  Off Abebe catheter as per urology.    Appreciate intensivist input.  Appreciate urology input.  Cleared by urology  As needed IV Lopressor and hydralazine.  Sliding-scale insulin.  Continue home CPAP.  Monitor and replace electrolytes  DC telemetry.  PT OT.  Patient does not want inpatient rehab  Discussed plan with RN and .  Disposition as per urology.  Likely discharge home November 12.    DVT prophylaxis:  Mechanical DVT prophylaxis orders are present.    CODE STATUS:   Level Of Support Discussed With: Patient  Code Status (Patient has no pulse and is not breathing): CPR (Attempt to Resuscitate)  Medical Interventions (Patient has pulse or is breathing): Full Support      Part of this note may be an electronic transcription/translation of spoken language to printed  text using the Dragon Dictation System.       Electronically signed by Dg Chowdhury MD, 11/11/23, 2:55 PM EST.

## 2023-11-12 LAB
ALBUMIN SERPL-MCNC: 3.4 G/DL (ref 3.5–5.2)
ANION GAP SERPL CALCULATED.3IONS-SCNC: 11.5 MMOL/L (ref 5–15)
BACTERIA BLD CULT: NORMAL
BACTERIA SPEC AEROBE CULT: ABNORMAL
BACTERIA SPEC AEROBE CULT: ABNORMAL
BASOPHILS # BLD MANUAL: 0.11 10*3/MM3 (ref 0–0.2)
BASOPHILS NFR BLD MANUAL: 1 % (ref 0–1.5)
BOTTLE TYPE: NORMAL
BUN SERPL-MCNC: 23 MG/DL (ref 8–23)
BUN/CREAT SERPL: 17 (ref 7–25)
CALCIUM SPEC-SCNC: 9.2 MG/DL (ref 8.6–10.5)
CHLORIDE SERPL-SCNC: 102 MMOL/L (ref 98–107)
CO2 SERPL-SCNC: 24.5 MMOL/L (ref 22–29)
CREAT SERPL-MCNC: 1.35 MG/DL (ref 0.76–1.27)
DEPRECATED RDW RBC AUTO: 44.9 FL (ref 37–54)
EGFRCR SERPLBLD CKD-EPI 2021: 55.8 ML/MIN/1.73
EOSINOPHIL # BLD MANUAL: 0.32 10*3/MM3 (ref 0–0.4)
EOSINOPHIL NFR BLD MANUAL: 3 % (ref 0.3–6.2)
ERYTHROCYTE [DISTWIDTH] IN BLOOD BY AUTOMATED COUNT: 13.8 % (ref 12.3–15.4)
GLUCOSE BLDC GLUCOMTR-MCNC: 223 MG/DL (ref 70–99)
GLUCOSE BLDC GLUCOMTR-MCNC: 236 MG/DL (ref 70–99)
GLUCOSE BLDC GLUCOMTR-MCNC: 276 MG/DL (ref 70–99)
GLUCOSE BLDC GLUCOMTR-MCNC: 294 MG/DL (ref 70–99)
GLUCOSE SERPL-MCNC: 256 MG/DL (ref 65–99)
GRAM STN SPEC: ABNORMAL
HCT VFR BLD AUTO: 42.3 % (ref 37.5–51)
HGB BLD-MCNC: 13.8 G/DL (ref 13–17.7)
ISOLATED FROM: ABNORMAL
ISOLATED FROM: ABNORMAL
LYMPHOCYTES # BLD MANUAL: 5.43 10*3/MM3 (ref 0.7–3.1)
LYMPHOCYTES NFR BLD MANUAL: 3 % (ref 5–12)
MCH RBC QN AUTO: 28.9 PG (ref 26.6–33)
MCHC RBC AUTO-ENTMCNC: 32.6 G/DL (ref 31.5–35.7)
MCV RBC AUTO: 88.5 FL (ref 79–97)
MONOCYTES # BLD: 0.32 10*3/MM3 (ref 0.1–0.9)
NEUTROPHILS # BLD AUTO: 4.47 10*3/MM3 (ref 1.7–7)
NEUTROPHILS NFR BLD MANUAL: 42 % (ref 42.7–76)
PHOSPHATE SERPL-MCNC: 3.1 MG/DL (ref 2.5–4.5)
PLATELET # BLD AUTO: 147 10*3/MM3 (ref 140–450)
PMV BLD AUTO: 10.5 FL (ref 6–12)
POTASSIUM SERPL-SCNC: 3.8 MMOL/L (ref 3.5–5.2)
PROCALCITONIN SERPL-MCNC: 9.98 NG/ML (ref 0–0.25)
RBC # BLD AUTO: 4.78 10*6/MM3 (ref 4.14–5.8)
RBC MORPH BLD: NORMAL
SCAN SLIDE: NORMAL
SMALL PLATELETS BLD QL SMEAR: ADEQUATE
SODIUM SERPL-SCNC: 138 MMOL/L (ref 136–145)
VARIANT LYMPHS NFR BLD MANUAL: 3 % (ref 0–5)
VARIANT LYMPHS NFR BLD MANUAL: 48 % (ref 19.6–45.3)
WBC MORPH BLD: NORMAL
WBC NRBC COR # BLD: 10.65 10*3/MM3 (ref 3.4–10.8)

## 2023-11-12 PROCEDURE — 85025 COMPLETE CBC W/AUTO DIFF WBC: CPT | Performed by: INTERNAL MEDICINE

## 2023-11-12 PROCEDURE — 63710000001 INSULIN LISPRO (HUMAN) PER 5 UNITS: Performed by: INTERNAL MEDICINE

## 2023-11-12 PROCEDURE — 25010000002 VANCOMYCIN 5 G RECONSTITUTED SOLUTION

## 2023-11-12 PROCEDURE — 25810000003 SODIUM CHLORIDE 0.9 % SOLUTION

## 2023-11-12 PROCEDURE — 94799 UNLISTED PULMONARY SVC/PX: CPT

## 2023-11-12 PROCEDURE — 80069 RENAL FUNCTION PANEL: CPT | Performed by: INTERNAL MEDICINE

## 2023-11-12 PROCEDURE — 94664 DEMO&/EVAL PT USE INHALER: CPT

## 2023-11-12 PROCEDURE — 85007 BL SMEAR W/DIFF WBC COUNT: CPT | Performed by: INTERNAL MEDICINE

## 2023-11-12 PROCEDURE — 87040 BLOOD CULTURE FOR BACTERIA: CPT | Performed by: INTERNAL MEDICINE

## 2023-11-12 PROCEDURE — 82948 REAGENT STRIP/BLOOD GLUCOSE: CPT

## 2023-11-12 PROCEDURE — 84145 PROCALCITONIN (PCT): CPT | Performed by: INTERNAL MEDICINE

## 2023-11-12 RX ORDER — LINEZOLID 600 MG/1
600 TABLET, FILM COATED ORAL EVERY 12 HOURS SCHEDULED
Status: DISCONTINUED | OUTPATIENT
Start: 2023-11-12 | End: 2023-11-13 | Stop reason: HOSPADM

## 2023-11-12 RX ADMIN — BUDESONIDE 0.5 MG: 0.5 INHALANT RESPIRATORY (INHALATION) at 19:01

## 2023-11-12 RX ADMIN — LINEZOLID 600 MG: 600 TABLET, FILM COATED ORAL at 21:39

## 2023-11-12 RX ADMIN — INSULIN LISPRO 4 UNITS: 100 INJECTION, SOLUTION INTRAVENOUS; SUBCUTANEOUS at 21:45

## 2023-11-12 RX ADMIN — BUDESONIDE 0.5 MG: 0.5 INHALANT RESPIRATORY (INHALATION) at 07:32

## 2023-11-12 RX ADMIN — VANCOMYCIN HYDROCHLORIDE 1250 MG: 500 INJECTION, POWDER, LYOPHILIZED, FOR SOLUTION INTRAVENOUS at 04:45

## 2023-11-12 RX ADMIN — INSULIN LISPRO 4 UNITS: 100 INJECTION, SOLUTION INTRAVENOUS; SUBCUTANEOUS at 08:18

## 2023-11-12 RX ADMIN — INSULIN LISPRO 3 UNITS: 100 INJECTION, SOLUTION INTRAVENOUS; SUBCUTANEOUS at 17:14

## 2023-11-12 RX ADMIN — ARFORMOTEROL TARTRATE 15 MCG: 15 SOLUTION RESPIRATORY (INHALATION) at 07:32

## 2023-11-12 RX ADMIN — FAMOTIDINE 20 MG: 20 TABLET, FILM COATED ORAL at 21:31

## 2023-11-12 RX ADMIN — LINEZOLID 600 MG: 600 TABLET, FILM COATED ORAL at 12:19

## 2023-11-12 RX ADMIN — ARFORMOTEROL TARTRATE 15 MCG: 15 SOLUTION RESPIRATORY (INHALATION) at 19:01

## 2023-11-12 RX ADMIN — INSULIN LISPRO 3 UNITS: 100 INJECTION, SOLUTION INTRAVENOUS; SUBCUTANEOUS at 12:18

## 2023-11-12 RX ADMIN — Medication 10 ML: at 21:31

## 2023-11-12 NOTE — PROGRESS NOTES
"[facility]: Vancomycin Monitoring Note    Tate Kelley is a 73 y.o. male who is on day  of pharmacy to dose vancomycin for Sepsis.    Previous Vancomycin Dose: 1250 mg IV every 12 hours  Imaging Reviewed?: Yes  Updated Cultures and Sensitivities:    blood ngd2   urine no growth  /: Blood - Staph species, not aureus or lugdunensis  /: Blood - Staph species, not aureus or lugdunensis  11/3: Urine - coag negative staph    Vitals/Labs  Ht: 185.4 cm (73\"); Wt: (!) 161 kg (354 lb 4.5 oz)   Temp (24hrs), Av.2 °F (36.8 °C), Min:97.6 °F (36.4 °C), Max:98.5 °F (36.9 °C)   Estimated Creatinine Clearance: 78.4 mL/min (A) (by C-G formula based on SCr of 1.35 mg/dL (H)).     Results from last 7 days   Lab Units 23  0446 23  1335 23  0448 11/10/23  0444   VANCOMYCIN RM mcg/mL  --  12.40  --  7.03   CREATININE mg/dL 1.35*  --  1.34* 1.41*   WBC 10*3/mm3 10.65  --  10.29 12.73*     Assessment/Plan    Current Vancomycin Dose:  1250 mg IV every 12 hours; which provides the following predicted parameters:  AUC24,ss: 476 mg/L.hr  PAUC*: 99 %  Ctrough,ss: 14.9 mg/L  Pconc*: 0 %  Tox.: 10 %  Next vanc random ordered for  at 0600  We will continue to monitor patient changes and renal function     Thank you for involving pharmacy in this patient's care. Please contact pharmacy with any questions or concerns.    Kadeem Madera [facility]  Clinical Pharmacist        "

## 2023-11-12 NOTE — PROGRESS NOTES
[facility]   Hospitalist Progress Note  Date: 2023  Patient Name: Tate Kelley  : 1950  MRN: [id number]  Date of admission: 2023      Subjective   Subjective     Chief Complaint: Tachycardic and febrile after left ureteral stone lithotripsy and stent exchange on     Summary:   Tate Kelley is a 73 y.o. male past medical history of obesity with BMI of 45, nephrolithiasis, type 2 diabetes, BPH, and hypertension who initially came in for cystoscopy with stent exchange and same-day surgery who developed fever and tachycardia so was taken to the PACU     History is obtained from the patient and the urologist.  Patient has been in and out of the OR multiple times for stent removals.  Today he seemed to be relatively well postoperatively.  However he developed fever, tachycardia, and rigors.  At this point he was moved to the PACU for further management and close monitoring.  Urology requested admission from internal medicine due to concern for possible sepsis.     In the PACU the patient's vital signs were as follows: Temperature was 99.5, pulse 150, respiratory rate was in the 20s to 30s, blood pressure 152/69, satting well on facemask.  CBC shows no significant abnormalities.  CMP shows a mild bump in his creatinine from 1.3->1.4.  ABG is 7.4/29/65.3 with a lactate of 3.7.  Initially, I was told his temperature was 105 and I was concern for NMS but upon seeing the patient clinically has temperature was actually 99.  He was not given dantrolene because there was no concern for NMS at that time although it was considered.  I asked the PACU to give the patient 2 L of lactated Ringer's.  We will dose with Zosyn and vancomycin.  Although initially the patient seem to be in sinus tachycardia on the monitor he was appearing to be in SVT which will be addressed once he gets to the ICU.  Patient will be admitted to the ICU due to sepsis from urinary source.  Patient stabilized  and transfer out of the unit.  Abebe catheter DC'd with good voiding.  Blood cultures positive with staph epi resistant to oxacillin.  Subsequent blood culture negative.    Interval Followup:   Remains on room air.  Patient does not use oxygen at home.  Remains on home CPAP most of the time.  Overall feels much better.  Going around on the floor with his motorized scooter  Dizziness better since stopping Flomax  tremors better.  Getting nebulizer treatment  No more shortness of air.  No chest pain.    Heart rate better   Complaining of occasional headache.  Voiding well.  Good urine output.  Off Abebe catheter  Regular BM.    Review of Systems   All systems were reviewed and negative except for: Summary and interval follow-up    Objective   Objective     Vitals:   Temp:  [97.6 °F (36.4 °C)-98.5 °F (36.9 °C)] 97.7 °F (36.5 °C)  Heart Rate:  [85-97] 90  Resp:  [16-20] 18  BP: (114-157)/() 157/89  Flow (L/min):  [2] 2  Physical Exam        Constitutional: Mild distress.              Eyes: Pupils equal, sclerae anicteric, no conjunctival injection              HENT: NCAT, mucous membranes moist              Neck: Supple, no thyromegaly, no lymphadenopathy, trachea midline              Respiratory: Clear to auscultation bilaterally, nonlabored respirations               Cardiovascular: RRR, no murmurs, rubs, or gallops, palpable pedal pulses bilaterally              Gastrointestinal: Positive bowel sounds, soft, nontender, nondistended              Musculoskeletal: Leg shows trace to 1+ pitting edema with chronic changes.  Left leg is amputated.  Left above-knee amputation              Psychiatric: Appropriate affect, cooperative              Neurologic: Oriented x 3, strength symmetric in all extremities, Cranial Nerves grossly intact to confrontation, speech clear              Skin: No rashes.  Chronic stasis changes right lower extremity    Result Review    Result Review:  I have personally reviewed the results  for the past 24 hours and agree with these findings:  [x]  Laboratory  [x]  Microbiology  [x]  Radiology  [x]  EKG/Telemetry sinus tachycardia at 136.  Right bundle block.  QT interval 0.43  []  Cardiology/Vascular   []  Pathology  [x]  Old records  [x]  Other: Medications    Assessment & Plan   Assessment / Plan     Assessment:  Sepsis present on admission.  Leukocytosis, tachycardia, fever and elevated procalcitonin.  UTI.  November 8 th blood culture positive for staph epidermidis resistant to oxacillin  Recurrent nephrolithiasis with a left ureteric stone.  S/p lithotripsy and stent exchange on November 7.  Hematuria due to above.  S/p CBI.  Improved.  Morbid obesity BMI 46.8.  Diabetes mellitus with hyperglycemia.  Obstructive sleep apnea on home CPAP.  Acute kidney injury on chronic kidney disease stage IIIa with baseline creatinine 1.4-1.6.  Resolved.  BPH.  Hypertension.  Right bundle branch block.  Hypomagnesemia.  Supplemented  History of CVA.  Thrombocytopenia.  Resolved    Plan:  P.o. Zyvox for 7 days.  Discussed with ID.  Bronchodilator and bronchopulmonary hygiene protocol.  DuoNeb, Pulmicort brovana neb.  Repeat chest x-ray November 9 left lower lobe basal opacity.  BNP -on November 7  Trend procalcitonin.  Improving  DC IV  Vanco.  Await subsequent culture data.  Negative to date  Off Abebe catheter as per urology.    Appreciate intensivist input.  Appreciate urology input.  Cleared by urology  As needed IV Lopressor and hydralazine.  Sliding-scale insulin.  Continue home CPAP.  Monitor and replace electrolytes  DC telemetry.  PT OT.  Patient does not want inpatient rehab  Discussed plan with RN and .  Disposition as per urology.  Likely discharge home November 13.  [facility] [place] [facility] and needs p.o. Zyvox for 5 to 6 days.    DVT prophylaxis:  Mechanical DVT prophylaxis orders are present.    CODE STATUS:   Level Of Support Discussed With: Patient  Code  Status (Patient has no pulse and is not breathing): CPR (Attempt to Resuscitate)  Medical Interventions (Patient has pulse or is breathing): Full Support      Part of this note may be an electronic transcription/translation of spoken language to printed text using the Dragon Dictation System.       Electronically signed by Dg Chowdhury MD, 11/12/23, 2:43 PM EST.

## 2023-11-12 NOTE — PLAN OF CARE
Problem: Adult Inpatient Plan of Care  Goal: Plan of Care Review  Outcome: Ongoing, Progressing  Flowsheets (Taken 11/12/2023 1533)  Outcome Evaluation: Up in wheelchair. Patient to stay one more day due to pharmacy being closed and no access to antibiotics to discharge with. Blood sugar checked and insulin given appropriately. Resting between care.  Goal: Patient-Specific Goal (Individualized)  Outcome: Ongoing, Progressing  Goal: Absence of Hospital-Acquired Illness or Injury  Outcome: Ongoing, Progressing  Intervention: Identify and Manage Fall Risk  Recent Flowsheet Documentation  Taken 11/12/2023 1414 by Alba Mena RN  Safety Promotion/Fall Prevention: safety round/check completed  Taken 11/12/2023 0820 by Alba Mena RN  Safety Promotion/Fall Prevention: safety round/check completed  Intervention: Prevent Skin Injury  Recent Flowsheet Documentation  Taken 11/12/2023 0820 by Alba Mena RN  Body Position: dangle, side of bed  Intervention: Prevent and Manage VTE (Venous Thromboembolism) Risk  Recent Flowsheet Documentation  Taken 11/12/2023 0820 by Alba Mena RN  Activity Management: up in chair  Intervention: Prevent Infection  Recent Flowsheet Documentation  Taken 11/12/2023 1414 by Alba Mena RN  Infection Prevention: hand hygiene promoted  Goal: Optimal Comfort and Wellbeing  Outcome: Ongoing, Progressing  Intervention: Provide Person-Centered Care  Recent Flowsheet Documentation  Taken 11/12/2023 0820 by Alba Mena RN  Trust Relationship/Rapport:   care explained   choices provided  Goal: Readiness for Transition of Care  Outcome: Ongoing, Progressing     Problem: Fall Injury Risk  Goal: Absence of Fall and Fall-Related Injury  Outcome: Ongoing, Progressing  Intervention: Promote Injury-Free Environment  Recent Flowsheet Documentation  Taken 11/12/2023 1414 by Alba Mena RN  Safety Promotion/Fall Prevention: safety round/check completed  Taken 11/12/2023  0820 by Alba Mena RN  Safety Promotion/Fall Prevention: safety round/check completed     Problem: Skin Injury Risk Increased  Goal: Skin Health and Integrity  Outcome: Ongoing, Progressing     Problem: Adjustment to Illness (Sepsis/Septic Shock)  Goal: Optimal Coping  Outcome: Ongoing, Progressing     Problem: Bleeding (Sepsis/Septic Shock)  Goal: Absence of Bleeding  Outcome: Ongoing, Progressing     Problem: Glycemic Control Impaired (Sepsis/Septic Shock)  Goal: Blood Glucose Level Within Desired Range  Outcome: Ongoing, Progressing     Problem: Infection Progression (Sepsis/Septic Shock)  Goal: Absence of Infection Signs and Symptoms  Outcome: Ongoing, Progressing  Intervention: Initiate Sepsis Management  Recent Flowsheet Documentation  Taken 11/12/2023 1414 by Alba Mena RN  Infection Prevention: hand hygiene promoted  Intervention: Promote Recovery  Recent Flowsheet Documentation  Taken 11/12/2023 0820 by Alba Mena RN  Activity Management: up in chair  Intervention: Promote Stabilization  Recent Flowsheet Documentation  Taken 11/12/2023 0820 by Alba Mena RN  Fluid/Electrolyte Management: fluids provided     Problem: Nutrition Impaired (Sepsis/Septic Shock)  Goal: Optimal Nutrition Intake  Outcome: Ongoing, Progressing   Goal Outcome Evaluation:              Outcome Evaluation: Up in wheelchair. Patient to stay one more day due to pharmacy being closed and no access to antibiotics to discharge with. Blood sugar checked and insulin given appropriately. Resting between care.

## 2023-11-13 ENCOUNTER — READMISSION MANAGEMENT (OUTPATIENT)
Dept: CALL CENTER | Facility: HOSPITAL | Age: 73
End: 2023-11-13
Payer: OTHER GOVERNMENT

## 2023-11-13 VITALS
OXYGEN SATURATION: 95 % | TEMPERATURE: 97.2 F | WEIGHT: 315 LBS | HEART RATE: 85 BPM | SYSTOLIC BLOOD PRESSURE: 141 MMHG | BODY MASS INDEX: 41.75 KG/M2 | HEIGHT: 73 IN | DIASTOLIC BLOOD PRESSURE: 89 MMHG | RESPIRATION RATE: 18 BRPM

## 2023-11-13 PROBLEM — A41.9 SEPSIS: Status: RESOLVED | Noted: 2023-11-07 | Resolved: 2023-11-13

## 2023-11-13 LAB
ALBUMIN SERPL-MCNC: 3.5 G/DL (ref 3.5–5.2)
ANION GAP SERPL CALCULATED.3IONS-SCNC: 9.4 MMOL/L (ref 5–15)
BUN SERPL-MCNC: 25 MG/DL (ref 8–23)
BUN/CREAT SERPL: 17.2 (ref 7–25)
CALCIUM SPEC-SCNC: 9.4 MG/DL (ref 8.6–10.5)
CHLORIDE SERPL-SCNC: 100 MMOL/L (ref 98–107)
CO2 SERPL-SCNC: 23.6 MMOL/L (ref 22–29)
CREAT SERPL-MCNC: 1.45 MG/DL (ref 0.76–1.27)
EGFRCR SERPLBLD CKD-EPI 2021: 51.2 ML/MIN/1.73
GLUCOSE BLDC GLUCOMTR-MCNC: 282 MG/DL (ref 70–99)
GLUCOSE BLDC GLUCOMTR-MCNC: 302 MG/DL (ref 70–99)
GLUCOSE SERPL-MCNC: 310 MG/DL (ref 65–99)
PHOSPHATE SERPL-MCNC: 3.4 MG/DL (ref 2.5–4.5)
POTASSIUM SERPL-SCNC: 4.1 MMOL/L (ref 3.5–5.2)
SODIUM SERPL-SCNC: 133 MMOL/L (ref 136–145)
VANCOMYCIN SERPL-MCNC: 10.01 MCG/ML (ref 5–40)

## 2023-11-13 PROCEDURE — 94799 UNLISTED PULMONARY SVC/PX: CPT

## 2023-11-13 PROCEDURE — 82948 REAGENT STRIP/BLOOD GLUCOSE: CPT

## 2023-11-13 PROCEDURE — 99231 SBSQ HOSP IP/OBS SF/LOW 25: CPT | Performed by: UROLOGY

## 2023-11-13 PROCEDURE — 63710000001 INSULIN LISPRO (HUMAN) PER 5 UNITS: Performed by: INTERNAL MEDICINE

## 2023-11-13 PROCEDURE — 80069 RENAL FUNCTION PANEL: CPT | Performed by: INTERNAL MEDICINE

## 2023-11-13 PROCEDURE — 80202 ASSAY OF VANCOMYCIN: CPT

## 2023-11-13 PROCEDURE — 94664 DEMO&/EVAL PT USE INHALER: CPT

## 2023-11-13 RX ORDER — SACCHAROMYCES BOULARDII 250 MG
250 CAPSULE ORAL 2 TIMES DAILY
Qty: 20 CAPSULE | Refills: 0 | Status: SHIPPED | OUTPATIENT
Start: 2023-11-13 | End: 2023-11-23

## 2023-11-13 RX ORDER — LINEZOLID 600 MG/1
600 TABLET, FILM COATED ORAL 2 TIMES DAILY
Qty: 14 TABLET | Refills: 0 | Status: SHIPPED | OUTPATIENT
Start: 2023-11-13 | End: 2023-11-13 | Stop reason: SDUPTHER

## 2023-11-13 RX ORDER — LINEZOLID 600 MG/1
600 TABLET, FILM COATED ORAL 2 TIMES DAILY
Qty: 14 TABLET | Refills: 0 | Status: SHIPPED | OUTPATIENT
Start: 2023-11-13 | End: 2023-11-20

## 2023-11-13 RX ADMIN — INSULIN LISPRO 5 UNITS: 100 INJECTION, SOLUTION INTRAVENOUS; SUBCUTANEOUS at 08:04

## 2023-11-13 RX ADMIN — LINEZOLID 600 MG: 600 TABLET, FILM COATED ORAL at 08:05

## 2023-11-13 RX ADMIN — INSULIN LISPRO 4 UNITS: 100 INJECTION, SOLUTION INTRAVENOUS; SUBCUTANEOUS at 12:14

## 2023-11-13 RX ADMIN — BUDESONIDE 0.5 MG: 0.5 INHALANT RESPIRATORY (INHALATION) at 07:21

## 2023-11-13 RX ADMIN — ARFORMOTEROL TARTRATE 15 MCG: 15 SOLUTION RESPIRATORY (INHALATION) at 07:20

## 2023-11-13 RX ADMIN — Medication 10 ML: at 08:05

## 2023-11-13 NOTE — OUTREACH NOTE
Prep Survey      Flowsheet Row Responses   [facility] facility patient discharged from? [facility]   Is LACE score < 7 ? No   Eligibility Readm Mgmt   Discharge diagnosis Ureterolithiasis/Sepsis   Does the patient have one of the following disease processes/diagnoses(primary or secondary)? Sepsis   Does the patient have Home health ordered? No   Is there a DME ordered? No   Prep survey completed? Yes            NEVIN SAMUELS - Registered Nurse

## 2023-11-13 NOTE — DISCHARGE SUMMARY
[facility]        HOSPITALIST  DISCHARGE SUMMARY    Patient Name: Tate Kelley  : 1950  MRN: [id number]    Date of Admission: 2023  Date of Discharge:  2023  Primary Care Physician: Cassie Osuna PA    Consults       Date and Time Order Name Status Description    2023  6:16 PM Inpatient Urology Consult      2023  6:16 PM Inpatient Pulmonology Consult Completed             Active and Resolved Hospital Problems:  Active Hospital Problems    Diagnosis POA    **Ureterolithiasis [N20.1] Yes      Resolved Hospital Problems    Diagnosis POA    Sepsis [A41.9] Yes     Sepsis present on admission.  Leukocytosis, tachycardia, fever and elevated procalcitonin.  UTI.   blood culture positive for staph epidermidis resistant to oxacillin  Recurrent nephrolithiasis with a left ureteric stone.  S/p lithotripsy and stent exchange on .  Hematuria due to above.  S/p CBI.  Improved.  Morbid obesity BMI 46.8.  Diabetes mellitus with hyperglycemia.  Obstructive sleep apnea on home CPAP.  Acute kidney injury on chronic kidney disease stage IIIa with baseline creatinine 1.4-1.6.  Resolved.  BPH.  Hypertension.  Right bundle branch block.  Hypomagnesemia.  Supplemented  History of CVA.  Thrombocytopenia.  Resolved  S/p left above-knee amputation  Hospital Course     Hospital Course:  Tate Kelley is a 73 y.o. male past medical history of obesity with BMI of 45, nephrolithiasis, type 2 diabetes, BPH, and hypertension who initially came in for cystoscopy with stent exchange and same-day surgery who developed fever and tachycardia so was taken to the PACU     History is obtained from the patient and the urologist.  Patient has been in and out of the OR multiple times for stent removals.  Today he seemed to be relatively well postoperatively.  However he developed fever, tachycardia, and rigors.  At this point he was moved to the PACU for  further management and close monitoring.  Urology requested admission from internal medicine due to concern for possible sepsis.     In the PACU the patient's vital signs were as follows: Temperature was 99.5, pulse 150, respiratory rate was in the 20s to 30s, blood pressure 152/69, satting well on facemask.  CBC shows no significant abnormalities.  CMP shows a mild bump in his creatinine from 1.3->1.4.  ABG is 7.4/29/65.3 with a lactate of 3.7.  Initially, I was told his temperature was 105 and I was concern for NMS but upon seeing the patient clinically has temperature was actually 99.  He was not given dantrolene because there was no concern for NMS at that time although it was considered.  I asked the PACU to give the patient 2 L of lactated Ringer's.  We will dose with Zosyn and vancomycin.  Although initially the patient seem to be in sinus tachycardia on the monitor he was appearing to be in SVT which will be addressed once he gets to the ICU.  Patient will be admitted to the ICU due to sepsis from urinary source.  Patient stabilized and transfer out of the unit.  Abebe catheter DC'd with good voiding.  Blood cultures positive with staph epi resistant to oxacillin.  Subsequent blood culture from November 9 is growing gram-positive rods after 2 days.  Most recent blood culture from November 12 is pending     Interval Followup:   Remains on room air.  Patient does not use oxygen at home.  Remains on home CPAP most of the time.  Overall feels much better.  Going around on the floor with his motorized scooter  Dizziness better since stopping Flomax  tremors better.  Getting nebulizer treatment  No more shortness of air.  No chest pain.    Heart rate better   Complaining of occasional headache.  Voiding well.  Good urine output.  Off Abebe catheter  Regular BM.  Patient wanting to go home.  Cleared by urology  Antibiotics discussed with ID    DISCHARGE Follow Up Recommendations for labs and diagnostics: PCP to  follow-up on the results of blood culture pending at the time of discharge.  Follow-up with urology for stent removal      Day of Discharge     Vital Signs:  Temp:  [97.2 °F (36.2 °C)-98.5 °F (36.9 °C)] 97.2 °F (36.2 °C)  Heart Rate:  [81-88] 85  Resp:  [18-20] 18  BP: (120-159)/(63-89) 141/89    Physical Exam:    Constitutional: Mild distress.              Eyes: Pupils equal, sclerae anicteric, no conjunctival injection              HENT: NCAT, mucous membranes moist              Neck: Supple, no thyromegaly, no lymphadenopathy, trachea midline              Respiratory: Clear to auscultation bilaterally, nonlabored respirations               Cardiovascular: RRR, no murmurs, rubs, or gallops, palpable pedal pulses bilaterally              Gastrointestinal: Positive bowel sounds, soft, nontender, nondistended              Musculoskeletal: Leg shows trace to 1+ pitting edema with chronic changes.  Left leg is amputated.  Left above-knee amputation              Psychiatric: Appropriate affect, cooperative              Neurologic: Oriented x 3, strength symmetric in all extremities, Cranial Nerves grossly intact to confrontation, speech clear              Skin: No rashes.  Chronic stasis changes right lower extremity    Discharge Details        Discharge Medications        New Medications        Instructions Start Date   linezolid 600 MG tablet  Commonly known as: Zyvox   600 mg, Oral, 2 Times Daily      oxybutynin 5 MG tablet  Commonly known as: DITROPAN   5 mg, Oral, 3 Times Daily PRN, May cause constipation      oxyCODONE-acetaminophen 5-325 MG per tablet  Commonly known as: Percocet   1-2 tablets, Oral, Every 6 Hours PRN      phenazopyridine 200 MG tablet  Commonly known as: Pyridium   200 mg, Oral, 3 Times Daily PRN      saccharomyces boulardii 250 MG capsule  Commonly known as: Florastor   250 mg, Oral, 2 Times Daily             Continue These Medications        Instructions Start Date   aspirin 325 MG tablet    325 mg, Oral, 2 Times Daily, LAST DOSE 11/6/23 AM      fexofenadine 180 MG tablet  Commonly known as: ALLEGRA   180 mg, Oral, Daily PRN      fish oil 1000 MG capsule capsule   3,000 mg, Oral, Daily With Breakfast      glipizide 5 MG tablet  Commonly known as: GLUCOTROL   Take 1 tablet by mouth 2 (Two) Times a Day Before Meals. LAST DOSE 11/6/23 PM      glucose 4 GM chewable tablet  Commonly known as: DEX4   16 g, Oral, As Needed      lisinopril 10 MG tablet  Commonly known as: PRINIVIL,ZESTRIL   5 mg, Oral, Daily      metFORMIN  MG 24 hr tablet  Commonly known as: GLUCOPHAGE-XR   1,000 mg, Oral, 2 Times Daily, LAST DOSE 11/6/23 BEFORE      tamsulosin 0.4 MG capsule 24 hr capsule  Commonly known as: FLOMAX   2 capsules, Oral, Daily      vitamin D 1.25 MG (51007 UT) capsule capsule  Commonly known as: ERGOCALCIFEROL   50,000 Units, Oral, Every 7 Days, Fridays               Allergies   Allergen Reactions    Latex Rash    Saccharin Nausea And Vomiting    Tuna Oil [Fish Oil] Nausea And Vomiting       Discharge Disposition:  Home or Self Care.  In private vehicle with his wife    Diet:  Diet Instructions       Diet: Diabetic Diets; Consistent Carbohydrate; Thin (IDDSI 0)      Discharge Diet: Diabetic Diets    Diabetic Diet: Consistent Carbohydrate    Fluid Consistency: Thin (IDDSI 0)            Discharge Activity:   Activity Instructions       Activity as Tolerated      Discharge Activity      Activity may be performed as tolerated.    Do not return to work or drive while taking narcotic pain medication.     Blood in the urine as well as back and crampy bladder pain is expected after this procedure and sometimes until stent is removed, take medications as needed and prescribed, and be sure to drink plenty of fluids. Wean narcotic pain medication as tolerated and discomfort improves.     Do not take additional Tylenol while taking prescription pain medication.  May take ibuprofen as needed for additional discomfort.   Once no longer taking narcotic pain medication, may take Tylenol and ibuprofen.     It is important to decrease the amount of pain medication as you are discomfort improves as this is not a medication that can be called in over the phone.     Call urology office with any questions or concerns.            CODE STATUS:  Code Status and Medical Interventions:   Ordered at: 11/07/23 1816     Level Of Support Discussed With:    Patient     Code Status (Patient has no pulse and is not breathing):    CPR (Attempt to Resuscitate)     Medical Interventions (Patient has pulse or is breathing):    Full Support         No future appointments.    Additional Instructions for the Follow-ups that You Need to Schedule       Discharge Follow-up with PCP   As directed       Currently Documented PCP:    Cassie Osuna PA    PCP Phone Number:    [phone]     Follow Up Details: 3 days        Discharge Follow-up with Specified Provider: Dr. Sarah Colon; 2 Weeks   As directed      To: Dr. Sarah Colon   Follow Up: 2 Weeks   Follow Up Details: Stent removal        Discharge Follow-up with Specified Provider: Dr. Palacios; OR 2 weeks, cystoscopy and stent removal   As directed      To: Dr. Palacios; OR 2 weeks, cystoscopy and stent removal   Follow Up Details: [phone]; office should call with details                Pertinent  and/or Most Recent Results     PROCEDURES:   Procedure:    CYSTOSCOPY URETEROSCOPY RETROGRADE PYELOGRAM HOLMIUM LASER STENT EXCHANGE  left  CPT(R) Code:  58800 - TN CYSTO/URETERO W/LITHOTRIPSY &INDWELL STENT INSRT       LAB RESULTS:      Lab 11/12/23  0446 11/11/23  0448 11/10/23  0444 11/09/23  0351 11/08/23  0528 11/08/23  0010 11/07/23  1758 11/07/23  1720   WBC 10.65 10.29 12.73* 16.90* 21.49*  --   --  3.62   HEMOGLOBIN 13.8 13.5 14.8 13.3 14.3   < >  --  16.9   HEMATOCRIT 42.3 41.7 45.8 41.9 45.2   < >  --  54.5*   PLATELETS 147 122* 112* 122* 139*  --   --  205   NEUTROS ABS 4.47  6.89 8.95* 13.44* 20.20*  --   --  2.41   IMMATURE GRANS (ABS)  --   --  0.17* 0.74*  --   --   --  0.03   LYMPHS ABS  --   --  2.79 2.02  --   --   --  1.09   MONOS ABS  --   --  0.47 0.61  --   --   --  0.02*   EOS ABS 0.32 0.62* 0.27 0.01  --   --   --  0.06   MCV 88.5 88.3 88.8 90.1 90.0  --   --  91.4   PROCALCITONIN 9.98*  --  33.88*  --  88.42*  --   --   --    LACTATE  --   --   --  1.8  --   --   --   --    LACTATE, ARTERIAL  --   --   --   --   --   --  3.76*  --     < > = values in this interval not displayed.         Lab 11/13/23  0526 11/12/23  0446 11/11/23  0448 11/10/23  1717 11/10/23  0444 11/09/23  0351 11/08/23  0528 11/08/23  0528 11/07/23  1758 11/07/23  1720   SODIUM 133* 138 135*  --  135* 134*  --  136  --  138   SODIUM, ARTERIAL  --   --   --   --   --   --   --   --  138.4  --    POTASSIUM 4.1 3.8 3.8  --  4.2 4.1  --  4.8  --  4.2   CHLORIDE 100 102 101  --  100 102  --  102  --  100   CO2 23.6 24.5 24.1  --  22.2 21.7*  --  20.5*  --  23.1   ANION GAP 9.4 11.5 9.9  --  12.8 10.3  --  13.5  --  14.9   BUN 25* 23 24*  --  24* 28*  --  29*  --  23   CREATININE 1.45* 1.35* 1.34*  --  1.41* 1.84*  --  2.05*  --  1.47*   EGFR 51.2* 55.8* 56.3*  --  52.9* 38.5*  --  33.8*  --  50.4*   GLUCOSE 310* 256* 258*  --  205* 151*  --  310*  --  208*   GLUCOSE, ARTERIAL  --   --   --   --   --   --   --   --  226*  --    CALCIUM 9.4 9.2 9.1  --  9.4 8.9  --  8.7  --  9.6   IONIZED CALCIUM  --   --   --   --   --   --   --   --  1.14  --    MAGNESIUM  --   --   --   --   --  1.7  --  1.2*  --  1.6   PHOSPHORUS 3.4 3.1 2.4* 2.5 2.1* 1.9*   < >  --   --   --     < > = values in this interval not displayed.         Lab 11/13/23  0526 11/12/23  0446 11/11/23  0448 11/10/23  0444 11/09/23  0351 11/08/23  0528   TOTAL PROTEIN  --   --   --   --  6.2 6.1   ALBUMIN 3.5 3.4* 3.3* 3.5 3.4* 3.6   GLOBULIN  --   --   --   --  2.8 2.5   ALT (SGPT)  --   --   --   --  40 33   AST (SGOT)  --   --   --   --  39 29    BILIRUBIN  --   --   --   --  0.9 0.9   ALK PHOS  --   --   --   --  85 67         Lab 11/07/23  1720   PROBNP <36.0                 Lab 11/07/23  1758   PH, ARTERIAL 7.401   PCO2, ARTERIAL 29.3*   PO2 ART 65.3*   O2 SATURATION ART 92.9*   FIO2 28   HCO3 ART 17.8*   BASE EXCESS ART -5.4*   CARBOXYHEMOGLOBIN 0.6     Brief Urine Lab Results  (Last result in the past 365 days)        Color   Clarity   Blood   Leuk Est   Nitrite   Protein   CREAT   Urine HCG        11/08/23 0934 Red   Cloudy   --  Comment: Result not available due to interfering substances.   --  Comment: Result not available due to interfering substances.   --  Comment: Result not available due to interfering substances.   --  Comment: Result not available due to interfering substances.                 Microbiology Results (last 10 days)       Procedure Component Value - Date/Time    Blood Culture - Blood, Hand, Right [[id number]]  (Abnormal) Collected: 11/09/23 1135    Lab Status: Preliminary result Specimen: Blood from Hand, Right Updated: 11/12/23 1810     Blood Culture Abnormal Stain     Gram Stain Anaerobic Bottle Gram positive bacilli    Blood Culture - Blood, Hand, Left [[id number]]  (Normal) Collected: 11/09/23 1135    Lab Status: Preliminary result Specimen: Blood from Hand, Left Updated: 11/13/23 1200     Blood Culture No growth at 4 days    Blood Culture ID, PCR - Blood, Hand, Right [[id number]] Collected: 11/09/23 1135    Lab Status: Final result Specimen: Blood from Hand, Right Updated: 11/12/23 1815     BCID, PCR Negative by BCID PCR. Culture to Follow.     BOTTLE TYPE Anaerobic Bottle    Urine Culture - Urine, Urine, Clean Catch [[id number]]  (Normal) Collected: 11/08/23 0934    Lab Status: Final result Specimen: Urine, Clean Catch Updated: 11/09/23 1142     Urine Culture No growth    Blood Culture - Blood, Arm, Right [[id number]]  (Abnormal)  (Susceptibility) Collected: 11/08/23 0010    Lab Status: Final result Specimen: Blood from Arm,  Right Updated: 11/12/23 0838     Blood Culture Staphylococcus epidermidis     Isolated from Aerobic and Anaerobic Bottles     Gram Stain Aerobic Bottle Gram positive cocci in clusters      Anaerobic Bottle Gram positive cocci in clusters    Susceptibility        Staphylococcus epidermidis      RIGOBERTO      Oxacillin Resistant      Vancomycin Susceptible                       Susceptibility Comments       Staphylococcus epidermidis    This isolate does not demonstrate inducible clindamycin resistance in vitro.                 Blood Culture - Blood, Arm, Right [[id number]]  (Abnormal) Collected: 11/08/23 0010    Lab Status: Final result Specimen: Blood from Arm, Right Updated: 11/12/23 0838     Blood Culture Staphylococcus epidermidis     Isolated from Aerobic and Anaerobic Bottles     Gram Stain Aerobic Bottle Gram positive cocci in clusters      Anaerobic Bottle Gram positive cocci in clusters    Narrative:      Refer to previous blood culture collected on 11/08/2023 0010 for MICs.    Blood Culture ID, PCR - Blood, Arm, Right [[id number]]  (Abnormal) Collected: 11/08/23 0010    Lab Status: Final result Specimen: Blood from Arm, Right Updated: 11/09/23 0449     BCID, PCR Staph spp, not aureus or lugdunensis. Identification by BCID2 PCR.     BOTTLE TYPE Aerobic Bottle            XR Chest 1 View    Result Date: 11/9/2023  Impression:   Cardiomegaly, unchanged.  Ill-defined opacity at the left lung base may represent pleural fluid and/or airspace disease.       MELANIE CLAROS MD       Electronically Signed and Approved By: MELANIE CLAROS MD on 11/09/2023 at 11:43             XR Chest 1 View    Result Date: 11/7/2023  Impression:   1. Cardiomegaly and pulmonary vascular congestion accentuated by low lung volumes and portable technique       MARIA DE JESUS DEVINE MD       Electronically Signed and Approved By: MARIA DE JESUS DEVINE MD on 11/07/2023 at 17:40                          Imaging Results (All)       Procedure Component Value  Units Date/Time    XR Chest 1 View [[id number]] Collected: 11/09/23 1143     Updated: 11/09/23 1146    Narrative:      PROCEDURE: XR CHEST 1 VW     COMPARISON: [facility], CT, CT ABDOMEN PELVIS WO CONTRAST, 9/22/2023, 14:37.  [facility], CR, XR CHEST 1 VW, 11/07/2023, 17:33.     INDICATIONS: Shortness of Breath     FINDINGS:   The heart remains slightly enlarged.  The pulmonary vascularity does not appear congested.     Ill-defined opacity at the left lung base may represent pleural fluid and/or airspace disease.     Bony structures appear intact       Impression:         Cardiomegaly, unchanged.     Ill-defined opacity at the left lung base may represent pleural fluid and/or airspace disease.                  MELANIE CLAROS MD         Electronically Signed and Approved By: MELANIE CLAROS MD on 11/09/2023 at 11:43                     XR Chest 1 View [[id number]] Collected: 11/07/23 1741     Updated: 11/07/23 1744    Narrative:      PROCEDURE: XR CHEST 1 VW     COMPARISON: [facility], CR, CHEST AP/PA 1 VIEW, 3/10/2016, 13:47.     INDICATIONS: SHORTNESS OF BREATH     FINDINGS:   Lung volumes are low.  The heart size is enlarged and the pulmonary vascularity is congested and   indistinct.  Findings are accentuated by low lung volumes and portable technique.  Vascular   crowding and dependent atelectasis noted at the lung bases.  Osseous structures are unremarkable       Impression:         1. Cardiomegaly and pulmonary vascular congestion accentuated by low lung volumes and portable   technique                  MARIA DE JESUS DEVINE MD         Electronically Signed and Approved By: MARIA DE JESUS DEVINE MD on 11/07/2023 at 17:40                     FL Surgery Fluoro [[id number]] Resulted: 11/07/23 1446     Updated: 11/07/23 1446    Narrative:      This procedure was auto-finalized with no dictation required.             Labs Pending at Discharge:  Pending Labs       Order Current Status     Blood Culture - Blood, Hand, Left In process    Blood Culture - Blood, Hand, Right In process    STONE ANALYSIS - Calculus, Ureter, Left In process    Blood Culture - Blood, Hand, Left Preliminary result    Blood Culture - Blood, Hand, Right Preliminary result                Time spent on Discharge including face to face service: 31 minutes  Part of this note may be an electronic transcription/translation of spoken language to printed text using the Dragon Dictation System.     TElectronically signed by Dg Chowdhury MD, 11/13/23, 1:24 PM EST.

## 2023-11-13 NOTE — SIGNIFICANT NOTE
11/13/23 0936   Plan   Plan Comments [facility] does not have Zyvox at this time.  Patient is agreeable to use [facility] at a cost of $14.00.  No PA needed.

## 2023-11-13 NOTE — PROGRESS NOTES
[facility]   Urology Progress Note    Patient Name: Tate Kelley  : 1950  MRN: [id number]  Primary Care Physician:  Cassie Osuna PA  Date of admission: 2023    Subjective   Subjective     Feels better, waiting for abx dispo plan      Objective   Objective     Vitals:   Temp:  [97.6 °F (36.4 °C)-98.5 °F (36.9 °C)] 97.9 °F (36.6 °C)  Heart Rate:  [81-90] 81  Resp:  [16-20] 20  BP: (120-159)/(63-89) 128/84  Flow (L/min):  [2] 2  Physical Exam     Alert and oriented x3  No acute distress  Unlabored respirations  Nontender/nondistended        Result Review    Result Review:  I have personally reviewed the results from the time of this admission to 2023 07:20 EST and agree with these findings:  [x]  Laboratory  [x]  Microbiology  []  Radiology  []  EKG/Telemetry   []  Cardiology/Vascular   []  Pathology  []  Old records  []  Other:      Assessment & Plan   Assessment / Plan     Brief Patient Summary:  Tate Kelley is a 73 y.o. male status post left URS, laser lithotripsy, and stent insertion, 2023 with postoperative sepsis of urinary origin    Active Hospital Problems:  Active Hospital Problems    Diagnosis     **Ureterolithiasis     Sepsis      Plan:   Labs reviewed,  Continue antibiotic  Maintain stent    Office to schedule patient for OR for stent removal in a couple weeks  Ok for home from a urology standpoint when appropriate per primary service  Questions addressed

## 2023-11-14 LAB — BACTERIA SPEC AEROBE CULT: NORMAL

## 2023-11-15 ENCOUNTER — READMISSION MANAGEMENT (OUTPATIENT)
Dept: CALL CENTER | Facility: HOSPITAL | Age: 73
End: 2023-11-15
Payer: OTHER GOVERNMENT

## 2023-11-15 NOTE — OUTREACH NOTE
"Sepsis Week 1 Survey      Flowsheet Row Responses   [facility] patient discharged from? [facility]   Does the patient have one of the following disease processes/diagnoses(primary or secondary)? Sepsis   Week 1 attempt successful? Yes   Call start time 1538   Call end time 1543   Discharge diagnosis Ureterolithiasis/Sepsis   Meds reviewed with patient/caregiver? Yes   Is the patient having any side effects they believe may be caused by any medication additions or changes? No   Does the patient have all medications related to this admission filled (includes all antibiotics, inhalers, nebulizers,steroids,etc.) Yes   Is the patient taking all medications as directed (includes completed medication regime)? Yes   Comments regarding appointments FU apts have been scheduled   Does the patient have a primary care provider?  Yes   Has the patient kept scheduled appointments due by today? N/A   Has home health visited the patient within 72 hours of discharge? N/A   Psychosocial issues? No   Did the patient receive a copy of their discharge instructions? Yes   Nursing interventions Reviewed instructions with patient   What is the patient's perception of their health status since discharge? Improving  [\"weak\" per pt]   Is the patient/caregiver able to teach back TIME? T emperature - higher or lower than normal, I nfection - may have signs and symptoms of an infection, M ental Decline - confused, sleepy, difficult to arouse, E xtremely Ill - severe pain, discomfort, shortness of breath   Nursing interventions Nurse provided reassurance to patient   Is patient/caregiver able to teach back steps to recovery at home? Set small, achievable goals for return to baseline health, Rest and regain strength, Talk about feelings with family/friends, Make a list of questions for PCP appoinment, Exercise as tolerated   Is the patient/caregiver able to teach back signs and symptoms of worsening condition: Fever, Rapid heart rate (>90), " Shortness of breath/rapid respiratory rate   If the patient is a current smoker, are they able to teach back resources for cessation? Not a smoker   Is the patient/caregiver able to teach back the hierarchy of who to call/visit for symptoms/problems? PCP, Specialist, Home health nurse, Urgent Care, ED, 911 Yes   Week 1 call completed? Yes   Call end time 1543            Nuria H - Registered Nurse

## 2023-11-17 ENCOUNTER — PREP FOR SURGERY (OUTPATIENT)
Dept: OTHER | Facility: HOSPITAL | Age: 73
End: 2023-11-17
Payer: OTHER GOVERNMENT

## 2023-11-17 ENCOUNTER — TELEPHONE (OUTPATIENT)
Dept: UROLOGY | Facility: CLINIC | Age: 73
End: 2023-11-17
Payer: OTHER GOVERNMENT

## 2023-11-17 DIAGNOSIS — Z01.818 PRE-OP TESTING: Primary | ICD-10-CM

## 2023-11-17 DIAGNOSIS — N13.30 HYDRONEPHROSIS, UNSPECIFIED HYDRONEPHROSIS TYPE: ICD-10-CM

## 2023-11-17 DIAGNOSIS — T19.9XXA FOREIGN BODY IN GENITOURINARY TRACT, INITIAL ENCOUNTER: Primary | ICD-10-CM

## 2023-11-17 LAB
AMM URATE MFR STONE: 100 %
BACTERIA SPEC AEROBE CULT: ABNORMAL
BACTERIA SPEC AEROBE CULT: NORMAL
BACTERIA SPEC AEROBE CULT: NORMAL
COLOR STONE: NORMAL
COMPN STONE: NORMAL
GRAM STN SPEC: ABNORMAL
ISOLATED FROM: ABNORMAL
LABORATORY COMMENT REPORT: NORMAL
Lab: NORMAL
Lab: NORMAL
PHOTO: NORMAL
SIZE STONE: NORMAL MM
SPEC SOURCE SUBJ: NORMAL
WT STONE: 46 MG

## 2023-11-17 NOTE — TELEPHONE ENCOUNTER
SPOKE TO PT AND ADVISED THAT WE NEED TO SCHED STENT REMOVAL IN OR FOR 11/27. PT ADVISED TO GET UCX ON 11/21 (ORDER IN); ADVISED [facility] WILL CALL HIM ON 11/24 W/ AN ARRIVAL TIME. PT EXPRESSED UNDERSTANDING AND IS AGREEABLE.    PT DOES TAKE 325 ASA; OK FOR STENT REMOVAL.

## 2023-11-21 ENCOUNTER — LAB (OUTPATIENT)
Dept: LAB | Facility: HOSPITAL | Age: 73
End: 2023-11-21
Payer: OTHER GOVERNMENT

## 2023-11-21 DIAGNOSIS — N13.30 HYDRONEPHROSIS, UNSPECIFIED HYDRONEPHROSIS TYPE: ICD-10-CM

## 2023-11-21 DIAGNOSIS — Z01.818 PRE-OP TESTING: ICD-10-CM

## 2023-11-21 PROCEDURE — 87186 SC STD MICRODIL/AGAR DIL: CPT

## 2023-11-21 PROCEDURE — 87077 CULTURE AEROBIC IDENTIFY: CPT

## 2023-11-21 PROCEDURE — 87086 URINE CULTURE/COLONY COUNT: CPT

## 2023-11-22 RX ORDER — DULAGLUTIDE 0.75 MG/.5ML
INJECTION, SOLUTION SUBCUTANEOUS
COMMUNITY
Start: 2023-11-18

## 2023-11-23 LAB — BACTERIA SPEC AEROBE CULT: ABNORMAL

## 2023-11-27 ENCOUNTER — ANESTHESIA (OUTPATIENT)
Dept: PERIOP | Facility: HOSPITAL | Age: 73
End: 2023-11-27
Payer: OTHER GOVERNMENT

## 2023-11-27 ENCOUNTER — HOSPITAL ENCOUNTER (OUTPATIENT)
Facility: HOSPITAL | Age: 73
Setting detail: HOSPITAL OUTPATIENT SURGERY
Discharge: HOME | End: 2023-11-27
Attending: UROLOGY | Admitting: UROLOGY
Payer: OTHER GOVERNMENT

## 2023-11-27 ENCOUNTER — ANESTHESIA EVENT (OUTPATIENT)
Dept: PERIOP | Facility: HOSPITAL | Age: 73
End: 2023-11-27
Payer: OTHER GOVERNMENT

## 2023-11-27 VITALS
DIASTOLIC BLOOD PRESSURE: 68 MMHG | HEART RATE: 89 BPM | RESPIRATION RATE: 18 BRPM | TEMPERATURE: 97.3 F | SYSTOLIC BLOOD PRESSURE: 144 MMHG | HEIGHT: 73 IN | BODY MASS INDEX: 41.75 KG/M2 | OXYGEN SATURATION: 99 % | WEIGHT: 315 LBS

## 2023-11-27 DIAGNOSIS — T19.9XXA FOREIGN BODY IN GENITOURINARY TRACT, INITIAL ENCOUNTER: ICD-10-CM

## 2023-11-27 DIAGNOSIS — N13.30 HYDRONEPHROSIS, UNSPECIFIED HYDRONEPHROSIS TYPE: Primary | ICD-10-CM

## 2023-11-27 LAB
GLUCOSE BLDC GLUCOMTR-MCNC: 193 MG/DL (ref 70–99)
GLUCOSE BLDC GLUCOMTR-MCNC: 200 MG/DL (ref 70–99)

## 2023-11-27 PROCEDURE — 25010000002 ONDANSETRON PER 1 MG: Performed by: NURSE ANESTHETIST, CERTIFIED REGISTERED

## 2023-11-27 PROCEDURE — 25010000002 MIDAZOLAM PER 1MG: Performed by: ANESTHESIOLOGY

## 2023-11-27 PROCEDURE — 25010000002 GENTAMICIN PER 80 MG: Performed by: UROLOGY

## 2023-11-27 PROCEDURE — 52310 CYSTOSCOPY AND TREATMENT: CPT | Performed by: UROLOGY

## 2023-11-27 PROCEDURE — C1769 GUIDE WIRE: HCPCS | Performed by: UROLOGY

## 2023-11-27 PROCEDURE — S0260 H&P FOR SURGERY: HCPCS | Performed by: UROLOGY

## 2023-11-27 PROCEDURE — 25810000003 LACTATED RINGERS PER 1000 ML: Performed by: ANESTHESIOLOGY

## 2023-11-27 PROCEDURE — 25010000002 METOCLOPRAMIDE PER 10 MG: Performed by: ANESTHESIOLOGY

## 2023-11-27 PROCEDURE — 25010000002 PROPOFOL 10 MG/ML EMULSION: Performed by: NURSE ANESTHETIST, CERTIFIED REGISTERED

## 2023-11-27 PROCEDURE — 82948 REAGENT STRIP/BLOOD GLUCOSE: CPT

## 2023-11-27 RX ORDER — PROMETHAZINE HYDROCHLORIDE 12.5 MG/1
12.5 TABLET ORAL ONCE AS NEEDED
Status: DISCONTINUED | OUTPATIENT
Start: 2023-11-27 | End: 2023-11-27 | Stop reason: HOSPADM

## 2023-11-27 RX ORDER — SCOLOPAMINE TRANSDERMAL SYSTEM 1 MG/1
1 PATCH, EXTENDED RELEASE TRANSDERMAL ONCE
Status: DISCONTINUED | OUTPATIENT
Start: 2023-11-27 | End: 2023-11-27 | Stop reason: HOSPADM

## 2023-11-27 RX ORDER — OXYCODONE HYDROCHLORIDE 5 MG/1
5 TABLET ORAL
Status: DISCONTINUED | OUTPATIENT
Start: 2023-11-27 | End: 2023-11-27 | Stop reason: HOSPADM

## 2023-11-27 RX ORDER — METOCLOPRAMIDE HYDROCHLORIDE 5 MG/ML
10 INJECTION INTRAMUSCULAR; INTRAVENOUS ONCE AS NEEDED
Status: COMPLETED | OUTPATIENT
Start: 2023-11-27 | End: 2023-11-27

## 2023-11-27 RX ORDER — PROMETHAZINE HYDROCHLORIDE 12.5 MG/1
25 TABLET ORAL ONCE AS NEEDED
Status: DISCONTINUED | OUTPATIENT
Start: 2023-11-27 | End: 2023-11-27 | Stop reason: HOSPADM

## 2023-11-27 RX ORDER — IBUPROFEN 600 MG/1
600 TABLET ORAL EVERY 6 HOURS PRN
Status: DISCONTINUED | OUTPATIENT
Start: 2023-11-27 | End: 2023-11-27 | Stop reason: HOSPADM

## 2023-11-27 RX ORDER — PROMETHAZINE HYDROCHLORIDE 25 MG/1
25 SUPPOSITORY RECTAL ONCE AS NEEDED
Status: DISCONTINUED | OUTPATIENT
Start: 2023-11-27 | End: 2023-11-27 | Stop reason: HOSPADM

## 2023-11-27 RX ORDER — SODIUM CHLORIDE, SODIUM LACTATE, POTASSIUM CHLORIDE, CALCIUM CHLORIDE 600; 310; 30; 20 MG/100ML; MG/100ML; MG/100ML; MG/100ML
9 INJECTION, SOLUTION INTRAVENOUS CONTINUOUS PRN
Status: DISCONTINUED | OUTPATIENT
Start: 2023-11-27 | End: 2023-11-27 | Stop reason: HOSPADM

## 2023-11-27 RX ORDER — ONDANSETRON 2 MG/ML
4 INJECTION INTRAMUSCULAR; INTRAVENOUS ONCE AS NEEDED
Status: DISCONTINUED | OUTPATIENT
Start: 2023-11-27 | End: 2023-11-27 | Stop reason: HOSPADM

## 2023-11-27 RX ORDER — KETAMINE HCL IN NACL, ISO-OSM 100MG/10ML
SYRINGE (ML) INJECTION AS NEEDED
Status: DISCONTINUED | OUTPATIENT
Start: 2023-11-27 | End: 2023-11-27 | Stop reason: SURG

## 2023-11-27 RX ORDER — ACETAMINOPHEN 500 MG
1000 TABLET ORAL ONCE
Status: COMPLETED | OUTPATIENT
Start: 2023-11-27 | End: 2023-11-27

## 2023-11-27 RX ORDER — ONDANSETRON 2 MG/ML
4 INJECTION INTRAMUSCULAR; INTRAVENOUS ONCE AS NEEDED
Status: DISCONTINUED | OUTPATIENT
Start: 2023-11-27 | End: 2023-11-27

## 2023-11-27 RX ORDER — PROMETHAZINE HYDROCHLORIDE 12.5 MG/1
25 TABLET ORAL ONCE AS NEEDED
Status: DISCONTINUED | OUTPATIENT
Start: 2023-11-27 | End: 2023-11-27

## 2023-11-27 RX ORDER — OXYCODONE HYDROCHLORIDE 5 MG/1
5 TABLET ORAL
Status: DISCONTINUED | OUTPATIENT
Start: 2023-11-27 | End: 2023-11-27

## 2023-11-27 RX ORDER — ONDANSETRON 4 MG/1
4 TABLET, FILM COATED ORAL ONCE AS NEEDED
Status: DISCONTINUED | OUTPATIENT
Start: 2023-11-27 | End: 2023-11-27 | Stop reason: HOSPADM

## 2023-11-27 RX ORDER — MEPERIDINE HYDROCHLORIDE 25 MG/ML
12.5 INJECTION INTRAMUSCULAR; INTRAVENOUS; SUBCUTANEOUS
Status: DISCONTINUED | OUTPATIENT
Start: 2023-11-27 | End: 2023-11-27

## 2023-11-27 RX ORDER — MIDAZOLAM HYDROCHLORIDE 2 MG/2ML
2 INJECTION, SOLUTION INTRAMUSCULAR; INTRAVENOUS ONCE
Status: COMPLETED | OUTPATIENT
Start: 2023-11-27 | End: 2023-11-27

## 2023-11-27 RX ORDER — ACETAMINOPHEN 325 MG/1
650 TABLET ORAL ONCE
Status: DISCONTINUED | OUTPATIENT
Start: 2023-11-27 | End: 2023-11-27 | Stop reason: HOSPADM

## 2023-11-27 RX ORDER — LIDOCAINE HYDROCHLORIDE 20 MG/ML
INJECTION, SOLUTION EPIDURAL; INFILTRATION; INTRACAUDAL; PERINEURAL AS NEEDED
Status: DISCONTINUED | OUTPATIENT
Start: 2023-11-27 | End: 2023-11-27 | Stop reason: SURG

## 2023-11-27 RX ORDER — ONDANSETRON 2 MG/ML
INJECTION INTRAMUSCULAR; INTRAVENOUS AS NEEDED
Status: DISCONTINUED | OUTPATIENT
Start: 2023-11-27 | End: 2023-11-27 | Stop reason: SURG

## 2023-11-27 RX ORDER — MEPERIDINE HYDROCHLORIDE 25 MG/ML
12.5 INJECTION INTRAMUSCULAR; INTRAVENOUS; SUBCUTANEOUS
Status: DISCONTINUED | OUTPATIENT
Start: 2023-11-27 | End: 2023-11-27 | Stop reason: HOSPADM

## 2023-11-27 RX ORDER — FUROSEMIDE 10 MG/ML
10 INJECTION INTRAMUSCULAR; INTRAVENOUS ONCE
Status: DISCONTINUED | OUTPATIENT
Start: 2023-11-27 | End: 2023-11-27 | Stop reason: HOSPADM

## 2023-11-27 RX ORDER — PROMETHAZINE HYDROCHLORIDE 25 MG/1
25 SUPPOSITORY RECTAL ONCE AS NEEDED
Status: DISCONTINUED | OUTPATIENT
Start: 2023-11-27 | End: 2023-11-27

## 2023-11-27 RX ORDER — MAGNESIUM HYDROXIDE 1200 MG/15ML
LIQUID ORAL AS NEEDED
Status: DISCONTINUED | OUTPATIENT
Start: 2023-11-27 | End: 2023-11-27 | Stop reason: HOSPADM

## 2023-11-27 RX ADMIN — SCOPALAMINE 1 PATCH: 1 PATCH, EXTENDED RELEASE TRANSDERMAL at 07:29

## 2023-11-27 RX ADMIN — Medication 50 MG: at 07:42

## 2023-11-27 RX ADMIN — PROPOFOL 150 MCG/KG/MIN: 10 INJECTION, EMULSION INTRAVENOUS at 07:42

## 2023-11-27 RX ADMIN — METOCLOPRAMIDE HYDROCHLORIDE 10 MG: 5 INJECTION INTRAMUSCULAR; INTRAVENOUS at 07:29

## 2023-11-27 RX ADMIN — MIDAZOLAM HYDROCHLORIDE 2 MG: 1 INJECTION, SOLUTION INTRAMUSCULAR; INTRAVENOUS at 07:33

## 2023-11-27 RX ADMIN — ACETAMINOPHEN 1000 MG: 500 TABLET ORAL at 07:29

## 2023-11-27 RX ADMIN — LIDOCAINE HYDROCHLORIDE 100 MG: 20 INJECTION, SOLUTION EPIDURAL; INFILTRATION; INTRACAUDAL; PERINEURAL at 07:42

## 2023-11-27 RX ADMIN — ONDANSETRON 4 MG: 2 INJECTION INTRAMUSCULAR; INTRAVENOUS at 07:39

## 2023-11-27 RX ADMIN — GENTAMICIN SULFATE 550 MG: 40 INJECTION, SOLUTION INTRAMUSCULAR; INTRAVENOUS at 07:38

## 2023-11-27 RX ADMIN — SODIUM CHLORIDE, POTASSIUM CHLORIDE, SODIUM LACTATE AND CALCIUM CHLORIDE 9 ML/HR: 600; 310; 30; 20 INJECTION, SOLUTION INTRAVENOUS at 07:28

## 2023-11-27 NOTE — ANESTHESIA PREPROCEDURE EVALUATION
Anesthesia Evaluation     Patient summary reviewed and Nursing notes reviewed   no history of anesthetic complications:   NPO Solid Status: > 8 hours  NPO Liquid Status: > 2 hours           Airway   Mallampati: III  TM distance: >3 FB  Neck ROM: full  No difficulty expected  Dental          Pulmonary - normal exam    breath sounds clear to auscultation  (+) COPD,sleep apnea  Cardiovascular - normal exam  Exercise tolerance: poor (<4 METS)    Rhythm: regular  Rate: normal    (+) hypertension, past MI , CAD, hyperlipidemia      Neuro/Psych  (+) CVA  GI/Hepatic/Renal/Endo    (+) GERD, GI bleeding , renal disease-, diabetes mellitus type 2    Musculoskeletal     Abdominal    Substance History - negative use     OB/GYN negative ob/gyn ROS         Other   arthritis,     ROS/Med Hx Other: PAT Nursing Notes unavailable.        ABNORMAL ECG - 11/7/23  Sinus tachycardia  Right bundle branch block  Inferior infarct, old    ROS/Med Hx Other: <4METS, HX LT AKA,COPD,CD, MI 2016,DM,RBBB. HERE 9/22/23 [email or web address]    When compared with ECG of 07-Nov-2023 16:47:48,  No significant change             Anesthesia Plan    ASA 4     general     (Patient understands anesthesia not responsible for dental damage.)  intravenous induction     Anesthetic plan, risks, benefits, and alternatives have been provided, discussed and informed consent has been obtained with: patient.    Use of blood products discussed with patient .    Plan discussed with CRNA.      CODE STATUS:

## 2023-11-27 NOTE — ANESTHESIA POSTPROCEDURE EVALUATION
Patient: Tate Kelley    Procedure Summary       Date: 11/27/23 Room / Location: [facility] OR  / [facility] MAIN OR    Anesthesia Start: 0738 Anesthesia Stop: 0759    Procedure: CYSTOSCOPY URETERAL STENT REMOVAL, left (Left) Diagnosis:       Foreign body in genitourinary tract, initial encounter      (Foreign body in genitourinary tract, initial encounter [T19.9XXA])    Surgeons: Sarah Palacios MD Provider: Cyrus Henriquez MD    Anesthesia Type: general ASA Status: 4            Anesthesia Type: general    Vitals  Vitals Value Taken Time   /82 11/27/23 0831   Temp 36.6 °C (97.8 °F) 11/27/23 0830   Pulse 83 11/27/23 0835   Resp 16 11/27/23 0835   SpO2 95 % 11/27/23 0835           Post Anesthesia Care and Evaluation    Patient location during evaluation: bedside  Patient participation: complete - patient participated  Level of consciousness: awake  Pain score: 2  Pain management: adequate    Airway patency: patent  PONV Status: none  Cardiovascular status: acceptable and stable  Respiratory status: acceptable  Hydration status: acceptable    Comments: An Anesthesiologist personally participated in the most demanding procedures (including induction and emergence if applicable) in the anesthesia plan, monitored the course of anesthesia administration at frequent intervals and remained physically present and available for immediate diagnosis and treatment of emergencies.        Pt with prolonged hx of nausea and vomiting, ordered scopolamine patch pt denotes post op much improved, pt denotes some blurriness with right eye which has been there since previous surgery.  Pt states much better anesthetic this go around compared to previous.

## 2023-11-27 NOTE — OP NOTE
Preoperative diagnosis  Foreign body in genitourinary system    Postoperative diagnosis  Foreign body in genitourinary system    Procedure performed  Cystoscopy, ureteral stent removal    Attending surgeon  Sarah Palacios MD     Anesthesia  MAC    EBL  0 mL    Complications  None    Specimen  None    Findings  Prostatomegaly; left ureteral stent removed completely intact; some encrustation distal portion of the stent    Indications  73 y.o. male agreed to undergo the above named procedure after discussion of the alternatives, risks and benefits.   Informed consent was obtained.      Procedure  After informed consent was obtained, the patient was taken back to the  operating suite where monitored anesthesia was administered. Once the patient  was adequately anesthetized, he was placed in the dorsal lithotomy position.  His genitals were prepped and draped in a normal sterile fashion.  Flexible  cystoscope was inserted gently in the patient's urethra meatus, which passed  atraumatically into the bladder.  The previously placed left ureteral stent was easily visible protruding from the left ureteral orifice.    This was grasped with a flexible grasper and retrieved.  The entire stent was inspected and intact upon retrieval.   At this point, the procedure was deemed terminated.    He was then awakened from anesthesia and transferred to the PACU in good condition.      Signed:  Sarah Palacios MD  11/27/23  08:09 EST

## 2023-11-27 NOTE — H&P
"  [facility]   Urology Preop H&P Note    Patient Name: Tate Kelley  : 1950  MRN: [id number]  Primary Care Physician:  Cassie Osuna PA  Referring Physician: Sarah Palacios MD  Date of admission: 2023    Subjective   Subjective     Reason for Consult/ Chief Complaint: Foreign body in genitourinary tract, initial encounter [T19.9XXA]    HPI:  Tate Kelley is a 73 y.o. male history ofForeign body in genitourinary tract, initial encounter [T19.9XXA] who presents for further management OR.  Presents for planned Procedure(s):  CYSTOSCOPY URETERAL STENT REMOVAL, left;  .  Procedure to be preformed on the left.  Risk, benefits, and alternatives discussed with patient prior to today.All questions were addressed after providing time for discussion.  Patient denies fever or specific flank pain.  States she has had some changes in vision since discharge from hospital also episodes of dizziness; believed to be associated to dehydration.      Review of Systems   All systems were reviewed and negative except for the above  Personal History     Past Medical History:   Diagnosis Date    Anesthesia     \"TROUBLE COMING OUT OF ANESTHESIA\" PT STATES HE HAS TROUBLE BREATHING    Arthritis     NECK SPINE    COPD (chronic obstructive pulmonary disease)     NO MEDS/INHALERS, SOA E    Coronary artery disease     NO STENTS/BYPASSES DONE, [facility]    DDD (degenerative disc disease), cervical     DM2 (diabetes mellitus, type 2)     ORAL MEDS/CHECKS BS, AVE     Flesh-eating bacteria     L LEG LED TO AMPUTATION NO CURRENT ISSUES    GERD (gastroesophageal reflux disease)     Heart attack     MI 2016, CLEARED BY CARDIOLOGY, PCP (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, WEAK L ARM    Tachycardia     NO MEDS CURRENTLY ASYMPTOMATIC, DENIES CP, SOAE    Uric acid kidney stone " 09/25/2019       Past Surgical History:   Procedure Laterality Date    ABDOMINAL SURGERY  2004    UMBILICAL HERNIA REPAIR     AMPUTATION Left     above the knee    COLONOSCOPY      CYSTOSCOPY URETEROSCOPY Left 06/28/2021    Procedure: CYSTOSCOPY URETEROSCOPY RETROGRADE PYELOGRAM STENT EXCHANGE LEFT;  Surgeon: Sarah Palacios MD;  Location: [facility];  Service: Urology;  Laterality: Left;    CYSTOSCOPY W/ URETERAL STENT PLACEMENT Left 09/22/2023    Procedure: CYSTOSCOPY URETERAL CATHETER/STENT INSERTION;  Surgeon: Brayden Medrano MD;  Location[facility] OR;  Service: Urology;  Laterality: Left;    ENDOSCOPY      x2    FOOT SURGERY Right     CARTLEGE REPAIR    KIDNEY STONE SURGERY      STENT INPLACED    KNEE SURGERY Right     ARTHROSCOPE    MOUTH SURGERY      TEETH EXTRACTED    URETEROSCOPY LASER LITHOTRIPSY WITH STENT INSERTION Left 11/7/2023    Procedure: CYSTOSCOPY URETEROSCOPY RETROGRADE PYELOGRAM HOLMIUM LASER STENT EXCHANGE  left;  Surgeon: Sarah Palacios MD;  Location: [facility];  Service: Urology;  Laterality: Left;       Family History: family history includes Heart attack in his father; Hypertension in his mother; Leukemia in his mother; Prostate cancer in his father. Otherwise pertinent FHx was reviewed and not pertinent to current issue.    Social History:  reports that he has never smoked. He has never used smokeless tobacco. He reports that he does not currently use drugs. He reports that he does not drink alcohol.    Home Medications:  Dulaglutide, aspirin, fexofenadine, fish oil, glipizide, glucose, lisinopril, metFORMIN ER, oxyCODONE-acetaminophen, oxybutynin, phenazopyridine, tamsulosin, and vitamin D    Allergies:  Allergies   Allergen Reactions    Latex Rash    Saccharin Nausea And Vomiting    Tuna Oil [Fish Oil] Nausea And Vomiting       Objective    Objective     Vitals:   Temp:  [97.1 °F (36.2 °C)] 97.1 °F (36.2 °C)  Heart Rate:  [89] 89  Resp:  [20] 20  BP: (139)/(74)  139/74    Physical Exam:   Constitutional: Awake, alert   Respiratory: Clear, nonlabored respirations    Cardiovascular: Regular rate, no chest retractions   gastrointestinal: Appears soft, nontender     Results:    Assessment & Plan   Assessment / Plan     Brief Patient Summary:  Tate Kelley is a 73 y.o. male who     Active Hospital Problems:  Active Hospital Problems    Diagnosis     **Foreign body in genitourinary tract        Plan:   Patient symptoms as above including change in vision, episodes of dizziness which he believes may be associated to dehydration were discussed with him at length.  Do not believe associated to current stent in place.  Patient notes understanding of the risks of procedure as well as anesthesia.  He was encouraged to call and schedule appointment with PCP as soon as possible or if symptoms persist or worsen, instructed to go to ER.  Notes understanding agreeable to proceed.    Proceed to the OR for planned procedure, Procedure(s):  CYSTOSCOPY URETERAL STENT REMOVAL, left,    Risk, benefits, and alternatives discussed with patient at length he is agreeable to proceed  Laterality identified, left  All questions addressed      Electronically signed by Sarah Palacios MD, 11/27/23, 7:06 AM EST.

## 2023-11-27 NOTE — DISCHARGE INSTRUCTIONS
DISCHARGE INSTRUCTIONS CYSTOSCOPY      For your surgery you had:  General anesthesia (you may have a sore throat for the first 24 hours)    You may experience dizziness, drowsiness, or lightheadedness for several hours following surgery.  Do not stay alone today or tonight.  Limit your activity for 24 hours.  You should not drive, operate machinery, drink alcohol, or sign legally binding documents for 24 hours or while you are taking pain medication.  Resume your diet slowly.  Follow any special dietary instructions you may have been given by your doctor.    NOTIFY YOUR DOCTOR IF YOU EXPERIENCE ANY OF THE FOLLOWING:  Temperature greater than 101 degrees Fahrenheit  Shaking Chills  Redness or excessive drainage from incision  Nausea, vomiting and/or pain that is not controlled by prescribed medications  Increase in bleeding or bleeding that is excessive  Unable to urinate in 6 hours after surgery  If unable to reach your doctor, please go to the closest Emergency Room   Following your cystoscopy exam, you may experience burning upon urination.  You may also pass some bloody urine.  If the burning sensation and/or bloody urine should persist beyond 48 hours, call your doctor.  To encourage kidney and bladder function, you should drink as much fluid as possible.  If you have difficulty urinating, try sitting in a bath tub of warm water.  If you become uncomfortable because you cannot urinate, call your doctor or come to the Emergency Room at the hospital.  Medications per physician instructions as indicated on Discharge Medication Information Sheet.    Last dose of pain medication given at:         .      SPECIAL INSTRUCTIONS:  Okay to resume aspirin Tuesday or Wednesday if urine is clearing and not passing excessive clot.           
3

## 2023-12-29 ENCOUNTER — HOSPITAL ENCOUNTER (OUTPATIENT)
Dept: ULTRASOUND IMAGING | Facility: HOSPITAL | Age: 73
Discharge: HOME | End: 2023-12-29
Admitting: UROLOGY
Payer: OTHER GOVERNMENT

## 2023-12-29 DIAGNOSIS — N13.30 HYDRONEPHROSIS, UNSPECIFIED HYDRONEPHROSIS TYPE: ICD-10-CM

## 2023-12-29 PROCEDURE — 76775 US EXAM ABDO BACK WALL LIM: CPT

## 2024-01-03 RX ORDER — SCOLOPAMINE TRANSDERMAL SYSTEM 1 MG/1
PATCH, EXTENDED RELEASE TRANSDERMAL
COMMUNITY
Start: 2023-12-18

## 2024-01-10 ENCOUNTER — OFFICE VISIT (OUTPATIENT)
Dept: UROLOGY | Facility: CLINIC | Age: 74
End: 2024-01-10
Payer: OTHER GOVERNMENT

## 2024-01-10 VITALS
WEIGHT: 315 LBS | DIASTOLIC BLOOD PRESSURE: 77 MMHG | TEMPERATURE: 97.7 F | RESPIRATION RATE: 16 BRPM | HEIGHT: 73 IN | BODY MASS INDEX: 41.75 KG/M2 | SYSTOLIC BLOOD PRESSURE: 150 MMHG

## 2024-01-10 DIAGNOSIS — N18.32 STAGE 3B CHRONIC KIDNEY DISEASE: ICD-10-CM

## 2024-01-10 DIAGNOSIS — N20.0 NEPHROLITHIASIS: Primary | ICD-10-CM

## 2024-01-10 NOTE — PROGRESS NOTES
"       UROLOGY OFFICE FOLLOW-UP NOTE    Subjective   HPI  Tate Kelley is a 73 y.o. male morbidly obese gentleman with numerous medical comorbidities including sleep apnea, COPD, dyslipidemia, hypertension, GERD, spinal stenosis, chronic constipation, report of CKD stage III, and left above-knee amputation for necrotizing fasciitis presents for further follow-up status post left ureteroscopy laser lithotripsy x2 for obstructive ureteral calculi, in July and August.  Patient subsequently underwent stent removal in OR and presents with renal ultrasound prior to today's appointment. Patient states that he has been doing well since stent removal.  Currently denies any urinary symptoms.  Denies hematuria or flank pain.  No complaints today.    Update 10/30/20: Presents for annual appt. States he is doing well from a stone standpoint; doesn't think he has any stones. Has not passed any since last visit. Notes a lingering intermittent right sided discomfort right under rib cage; uncertain why, doesn't think it is associated with stone pain. On occasion urine will be dark to light, associated with liquid intake. Unsure if he is emptying. Doesn't want Flomax, took before and it increased frequency of urination.   Saw nephrology; reports no change in meds or general mgt; will see annually.    Update 12/23/2020: Patient presents for follow-up after left ureteroscopy, laser lithotripsy and stent.  Patient remains with stent in place.  Intrarenal collecting system with challenging visibility due to large amount of intrarenal sediment and dust in all poles; fragments generally very fragile.    Sent home on potassium citrate BID and was instructed to have labs prior to today's visit; [facility] patient has those results. Never started potassium citrate.   Reports doing \"really good.\"  States Flomax helps his urination; request refill.  Stent remains in place.   Per patient, labs 12/9/20: creatinine 1.23; " potassium 4.1; unable to locate urine pH    Update 6/16/21: Presents for follow up after several missed appts; currently has left ureteral stent in place. Recently got back from vacation in [place]. No longer taking flomax.   Took potassium citrate for short time but reports some GI upset so stopped it. Did not have labs prior to today's visit.  Generally feels well.  Unsure when his next nephrology appointment is.    Update 4/19/2022: Presents for follow-up of nephrolithiasis, elevated psa. Status post left ureteroscopy, stone treatment, 6/28/2021.  Patient subsequently underwent stent removal in office and presents with renal ultrasound prior which was performed 7/26/2021 patient failed to follow-up after. Had left flank pain about 6 weeks after the ERLIN; thinks that he passed a stone. Reports currently doing very well.  Currently denies any urinary symptoms, frequently.  Denies hematuria or flank pain.  Still taking flomax.   Saw nephrology, Dr. Rosenberg, in February, now taking potassium citrate.   Was to bring PSA with him, but forgot it at home.  Has intentionally lost about 50 lbs.     Update 1/10/2024: Presents for follow-up status post left ureteroscopy, stone treatment, 11/7/2023; complicated by postoperative sepsis requiring ICU, eventually discharged home.  Patient subsequently underwent stent removal in office and presents with renal ultrasound prior to today's appointment.  Patient states that he has been doing well since the procedure.  Currently denies any urinary symptoms.  Denies hematuria or flank pain.  No complaints today.  States he is still coming out of the anesthesia fog.   Had been on potassium citrate in past; not sure if currently taking; thinks he might be out.   Denies urinary symptoms today; notes he monitors his urine and is clear.       _______________  Renal ultrasound 12/29/2023: No significant hydronephrosis; renal cysts; Left-sided stone or cluster of stones measuring up to about 1.2  "centimeters.    Ureteral jets were not visualized.  No bladder finding.    ERLIN 7/26/2021: Moderate left-sided hydronephrosis. Possible filling defect within the left inferior pole calyx measuring 1.9 x 1.5 x 1.8 cm.  There   is no shadowing.  This could reflect a nonshadowing stone or prominent renal sinus fat.    Correlation with prior retrograde findings and/or CT urogram could be considered.  3. Exophytic simple cyst arising from the right kidney.      Stone composition:  11/7/2023: 100% ammonium acid urate  6/28/2021: 90% ammonium acid urate; 10% uric acid  12/16/2020: Uric acid 60%; ammonia acid urate 40%  7/14/2019: 100% uric acid  2/1/2018: 88% uric acid, 10% calcium oxalate monohydrate    Urine pH:  8/8/2019: 6.5  7/14/2019: 7.0  5/30/2018: 7.5  1/27/2018: 5.0  1/26/2018: 6.0  3/18/2016: 7.5  3/7/2016: 5.5    Creatinine 8/12/2019: 1.13; BUN: 17         Review of systems  Constitutional: Denies fever chills  GI: Denies nausea, vomiting    Objective     Vital Signs:   /77 (BP Location: Left arm)   Temp 97.7 °F (36.5 °C)   Resp 16   Ht 185.4 cm (72.99\")   Wt (!) 155 kg (341 lb)   BMI 45.00 kg/m²       Physical exam  No acute distress, morbidly obese, in motorized wheelchair  Awake alert and oriented  Mood normal; affect normal  Extremities: left aka      Problem List:  Patient Active Problem List   Diagnosis    DM2 (diabetes mellitus, type 2)    Flesh-eating bacteria    Heart attack    GI bleed    Stroke    Uric acid kidney stone    Nephrolithiasis    Hypertension    Localized edema    Persistent proteinuria    Stage 3b chronic kidney disease    Ureterolithiasis       Assessment & Plan   Diagnoses and all orders for this visit:    1. Nephrolithiasis (Primary)  -     [facility] 24-Hour Urine, Kidney Stone -; Future    2. Stage 3b chronic kidney disease      Renal ultrasound reviewed, no evidence of hydronephrosis; believe suspected stones to be residual sediment and sludge  Continue to monitor " symptoms.    Encouraged ample hydration; prompt treatment should UTI symptoms develop    Chemical stone analysis with ammonium acid urate and uric acid; developed stone disease exacerbated by medical comorbidities, medications.      Discussed again the importance of partnering with nephrology for medical optimization of metabolic stone disease, stone formation  Needs follow up with Dr. Rosenberg, nephrology; pt to call to arrange.   Obtain 24 hour urine prior to f/u with nephrology     Patient continue to monitor symptoms; notify urology with any concerns or complaints and call to arrange follow-up if needed    All questions addressed

## 2024-01-12 ENCOUNTER — TELEPHONE (OUTPATIENT)
Dept: UROLOGY | Facility: CLINIC | Age: 74
End: 2024-01-12
Payer: OTHER GOVERNMENT

## 2024-01-12 NOTE — TELEPHONE ENCOUNTER
"CALLED PT TO RELAY INFO ABOUT HIS APPT WITH DR KRUGER. PT SAID THAT HE COULDN'T TALK RIGHT NOW AND HE WOULD TRY TO CALL ME BACK.    Relay     \"YOUR APPOINTMENT WITH DR KRUGER IS SCHEDULED FOR 3/20/24 AT 1115 AM. PLEASE ARRIVE 15 MINUTES EARLY. REMEMBER TO HAVE YOUR 24 HOUR URINE TEST DONE PRIOR TO YOUR APPOINTMENT SO THEY MAY REVIEW THE RESULTS WITH YOU\"                "

## 2024-02-13 ENCOUNTER — TRANSCRIBE ORDERS (OUTPATIENT)
Dept: ADMINISTRATIVE | Facility: HOSPITAL | Age: 74
End: 2024-02-13
Payer: OTHER GOVERNMENT

## 2024-02-13 DIAGNOSIS — E55.9 VITAMIN D DEFICIENCY: ICD-10-CM

## 2024-02-13 DIAGNOSIS — N18.32 CHRONIC KIDNEY DISEASE (CKD) STAGE G3B/A1, MODERATELY DECREASED GLOMERULAR FILTRATION RATE (GFR) BETWEEN 30-44 ML/MIN/1.73 SQUARE METER AND ALBUMINURIA CREATININE RATIO LESS THAN 30 MG/G (CMS/H*: Primary | ICD-10-CM

## 2024-02-22 ENCOUNTER — LAB (OUTPATIENT)
Dept: LAB | Facility: HOSPITAL | Age: 74
End: 2024-02-22
Payer: OTHER GOVERNMENT

## 2024-02-22 DIAGNOSIS — N18.32 CHRONIC KIDNEY DISEASE (CKD) STAGE G3B/A1, MODERATELY DECREASED GLOMERULAR FILTRATION RATE (GFR) BETWEEN 30-44 ML/MIN/1.73 SQUARE METER AND ALBUMINURIA CREATININE RATIO LESS THAN 30 MG/G (CMS/H*: ICD-10-CM

## 2024-02-22 DIAGNOSIS — E55.9 VITAMIN D DEFICIENCY: ICD-10-CM

## 2024-02-22 LAB
25(OH)D3 SERPL-MCNC: 20.6 NG/ML (ref 30–100)
ALBUMIN SERPL-MCNC: 4 G/DL (ref 3.5–5.2)
ANION GAP SERPL CALCULATED.3IONS-SCNC: 12.6 MMOL/L (ref 5–15)
BACTERIA UR QL AUTO: ABNORMAL /HPF
BASOPHILS # BLD AUTO: 0.04 10*3/MM3 (ref 0–0.2)
BASOPHILS NFR BLD AUTO: 0.5 % (ref 0–1.5)
BILIRUB UR QL STRIP: NEGATIVE
BUN SERPL-MCNC: 22 MG/DL (ref 8–23)
BUN/CREAT SERPL: 19.8 (ref 7–25)
CALCIUM SPEC-SCNC: 9.1 MG/DL (ref 8.6–10.5)
CHLORIDE SERPL-SCNC: 106 MMOL/L (ref 98–107)
CLARITY UR: CLEAR
CO2 SERPL-SCNC: 19.4 MMOL/L (ref 22–29)
COLOR UR: YELLOW
CREAT SERPL-MCNC: 1.11 MG/DL (ref 0.76–1.27)
CREAT UR-MCNC: 128.8 MG/DL
DEPRECATED RDW RBC AUTO: 41.5 FL (ref 37–54)
EGFRCR SERPLBLD CKD-EPI 2021: 70.6 ML/MIN/1.73
EOSINOPHIL # BLD AUTO: 0.38 10*3/MM3 (ref 0–0.4)
EOSINOPHIL NFR BLD AUTO: 4.4 % (ref 0.3–6.2)
ERYTHROCYTE [DISTWIDTH] IN BLOOD BY AUTOMATED COUNT: 13.1 % (ref 12.3–15.4)
GLUCOSE SERPL-MCNC: 206 MG/DL (ref 65–99)
GLUCOSE UR STRIP-MCNC: ABNORMAL MG/DL
HCT VFR BLD AUTO: 43.4 % (ref 37.5–51)
HGB BLD-MCNC: 14.4 G/DL (ref 13–17.7)
HGB UR QL STRIP.AUTO: NEGATIVE
HYALINE CASTS UR QL AUTO: ABNORMAL /LPF
IMM GRANULOCYTES # BLD AUTO: 0.01 10*3/MM3 (ref 0–0.05)
IMM GRANULOCYTES NFR BLD AUTO: 0.1 % (ref 0–0.5)
KETONES UR QL STRIP: NEGATIVE
LEUKOCYTE ESTERASE UR QL STRIP.AUTO: ABNORMAL
LYMPHOCYTES # BLD AUTO: 3.98 10*3/MM3 (ref 0.7–3.1)
LYMPHOCYTES NFR BLD AUTO: 46.4 % (ref 19.6–45.3)
MCH RBC QN AUTO: 29 PG (ref 26.6–33)
MCHC RBC AUTO-ENTMCNC: 33.2 G/DL (ref 31.5–35.7)
MCV RBC AUTO: 87.3 FL (ref 79–97)
MONOCYTES # BLD AUTO: 0.48 10*3/MM3 (ref 0.1–0.9)
MONOCYTES NFR BLD AUTO: 5.6 % (ref 5–12)
NEUTROPHILS NFR BLD AUTO: 3.69 10*3/MM3 (ref 1.7–7)
NEUTROPHILS NFR BLD AUTO: 43 % (ref 42.7–76)
NITRITE UR QL STRIP: NEGATIVE
NRBC BLD AUTO-RTO: 0 /100 WBC (ref 0–0.2)
PH UR STRIP.AUTO: 6 [PH] (ref 5–8)
PHOSPHATE SERPL-MCNC: 2.2 MG/DL (ref 2.5–4.5)
PLATELET # BLD AUTO: 200 10*3/MM3 (ref 140–450)
PMV BLD AUTO: 10 FL (ref 6–12)
POTASSIUM SERPL-SCNC: 4.5 MMOL/L (ref 3.5–5.2)
PROT ?TM UR-MCNC: 19.6 MG/DL
PROT UR QL STRIP: ABNORMAL
PROT/CREAT UR: 0.15 MG/G{CREAT}
PTH-INTACT SERPL-MCNC: 76.3 PG/ML (ref 15–65)
RBC # BLD AUTO: 4.97 10*6/MM3 (ref 4.14–5.8)
RBC # UR STRIP: ABNORMAL /HPF
REF LAB TEST METHOD: ABNORMAL
SODIUM SERPL-SCNC: 138 MMOL/L (ref 136–145)
SP GR UR STRIP: 1.02 (ref 1–1.03)
SQUAMOUS #/AREA URNS HPF: ABNORMAL /HPF
UROBILINOGEN UR QL STRIP: ABNORMAL
WBC # UR STRIP: ABNORMAL /HPF
WBC NRBC COR # BLD AUTO: 8.58 10*3/MM3 (ref 3.4–10.8)

## 2024-02-22 PROCEDURE — 85025 COMPLETE CBC W/AUTO DIFF WBC: CPT

## 2024-02-22 PROCEDURE — 80069 RENAL FUNCTION PANEL: CPT

## 2024-02-22 PROCEDURE — 82306 VITAMIN D 25 HYDROXY: CPT

## 2024-02-22 PROCEDURE — 83970 ASSAY OF PARATHORMONE: CPT

## 2024-02-22 PROCEDURE — 81001 URINALYSIS AUTO W/SCOPE: CPT

## 2024-02-22 PROCEDURE — 36415 COLL VENOUS BLD VENIPUNCTURE: CPT

## 2024-02-22 PROCEDURE — 84156 ASSAY OF PROTEIN URINE: CPT

## 2024-02-22 PROCEDURE — 82570 ASSAY OF URINE CREATININE: CPT

## 2024-05-16 ENCOUNTER — TRANSCRIBE ORDERS (OUTPATIENT)
Dept: ADMINISTRATIVE | Facility: HOSPITAL | Age: 74
End: 2024-05-16
Payer: OTHER GOVERNMENT

## 2024-05-16 DIAGNOSIS — R60.0 LOCALIZED EDEMA: ICD-10-CM

## 2024-05-16 DIAGNOSIS — E55.9 VITAMIN D DEFICIENCY, UNSPECIFIED: ICD-10-CM

## 2024-05-16 DIAGNOSIS — N18.32 STAGE 3B CHRONIC KIDNEY DISEASE: Primary | ICD-10-CM

## 2024-05-16 DIAGNOSIS — N39.0 URINARY TRACT INFECTION WITHOUT HEMATURIA, SITE UNSPECIFIED: ICD-10-CM

## 2024-05-16 DIAGNOSIS — R80.1 PERSISTENT PROTEINURIA: ICD-10-CM

## 2024-05-16 DIAGNOSIS — I10 ESSENTIAL (PRIMARY) HYPERTENSION: ICD-10-CM

## 2024-05-16 DIAGNOSIS — E11.22 TYPE 2 DIABETES MELLITUS WITH DIABETIC CHRONIC KIDNEY DISEASE, UNSPECIFIED CKD STAGE, UNSPECIFIED WHETHER LONG TERM INSULIN USE: ICD-10-CM

## 2024-06-04 ENCOUNTER — TRANSCRIBE ORDERS (OUTPATIENT)
Dept: ADMINISTRATIVE | Facility: HOSPITAL | Age: 74
End: 2024-06-04
Payer: OTHER GOVERNMENT

## 2024-06-04 DIAGNOSIS — R07.9 CHEST PAIN, UNSPECIFIED TYPE: Primary | ICD-10-CM

## 2024-06-19 ENCOUNTER — TRANSCRIBE ORDERS (OUTPATIENT)
Dept: ADMINISTRATIVE | Facility: HOSPITAL | Age: 74
End: 2024-06-19
Payer: OTHER GOVERNMENT

## 2024-06-19 DIAGNOSIS — R07.9 CHEST PAIN, UNSPECIFIED TYPE: Primary | ICD-10-CM

## 2024-08-02 ENCOUNTER — HOSPITAL ENCOUNTER (OUTPATIENT)
Dept: NUCLEAR MEDICINE | Facility: HOSPITAL | Age: 74
Discharge: HOME | End: 2024-08-02
Payer: MEDICARE

## 2024-08-02 ENCOUNTER — HOSPITAL ENCOUNTER (OUTPATIENT)
Dept: CARDIOLOGY | Facility: HOSPITAL | Age: 74
Discharge: HOME | End: 2024-08-02
Payer: MEDICARE

## 2024-08-02 DIAGNOSIS — R07.9 CHEST PAIN, UNSPECIFIED TYPE: ICD-10-CM

## 2024-08-21 ENCOUNTER — TRANSCRIBE ORDERS (OUTPATIENT)
Dept: ADMINISTRATIVE | Facility: HOSPITAL | Age: 74
End: 2024-08-21
Payer: OTHER GOVERNMENT

## 2024-08-21 DIAGNOSIS — R60.0 LOCALIZED EDEMA: ICD-10-CM

## 2024-08-21 DIAGNOSIS — N18.32 STAGE 3B CHRONIC KIDNEY DISEASE: Primary | ICD-10-CM

## 2024-08-21 DIAGNOSIS — E55.9 VITAMIN D DEFICIENCY, UNSPECIFIED: ICD-10-CM

## 2025-02-12 ENCOUNTER — LAB (OUTPATIENT)
Facility: HOSPITAL | Age: 75
End: 2025-02-12
Payer: MEDICARE

## 2025-02-12 DIAGNOSIS — R60.0 LOCALIZED EDEMA: ICD-10-CM

## 2025-02-12 DIAGNOSIS — R80.1 PERSISTENT PROTEINURIA: ICD-10-CM

## 2025-02-12 DIAGNOSIS — N18.32 STAGE 3B CHRONIC KIDNEY DISEASE: ICD-10-CM

## 2025-02-12 DIAGNOSIS — E11.22 TYPE 2 DIABETES MELLITUS WITH DIABETIC CHRONIC KIDNEY DISEASE, UNSPECIFIED CKD STAGE, UNSPECIFIED WHETHER LONG TERM INSULIN USE: ICD-10-CM

## 2025-02-12 DIAGNOSIS — E55.9 VITAMIN D DEFICIENCY, UNSPECIFIED: ICD-10-CM

## 2025-02-12 DIAGNOSIS — I10 ESSENTIAL (PRIMARY) HYPERTENSION: ICD-10-CM

## 2025-02-12 DIAGNOSIS — N39.0 URINARY TRACT INFECTION WITHOUT HEMATURIA, SITE UNSPECIFIED: ICD-10-CM

## 2025-02-12 LAB
25(OH)D3 SERPL-MCNC: 33.8 NG/ML (ref 30–100)
ALBUMIN SERPL-MCNC: 3.8 G/DL (ref 3.5–5.2)
ANION GAP SERPL CALCULATED.3IONS-SCNC: 10.9 MMOL/L (ref 5–15)
BACTERIA UR QL AUTO: ABNORMAL /HPF
BASOPHILS # BLD MANUAL: 0.12 10*3/MM3 (ref 0–0.2)
BASOPHILS NFR BLD MANUAL: 1.1 % (ref 0–1.5)
BILIRUB UR QL STRIP: NEGATIVE
BUN SERPL-MCNC: 22 MG/DL (ref 8–23)
BUN/CREAT SERPL: 15.9 (ref 7–25)
CALCIUM SPEC-SCNC: 9.7 MG/DL (ref 8.6–10.5)
CHLORIDE SERPL-SCNC: 104 MMOL/L (ref 98–107)
CLARITY UR: ABNORMAL
CO2 SERPL-SCNC: 21.1 MMOL/L (ref 22–29)
COLOR UR: YELLOW
CREAT SERPL-MCNC: 1.38 MG/DL (ref 0.76–1.27)
CREAT UR-MCNC: 113.1 MG/DL
DEPRECATED RDW RBC AUTO: 42.8 FL (ref 37–54)
EGFRCR SERPLBLD CKD-EPI 2021: 54 ML/MIN/1.73
EOSINOPHIL # BLD MANUAL: 0.12 10*3/MM3 (ref 0–0.4)
EOSINOPHIL NFR BLD MANUAL: 1.1 % (ref 0.3–6.2)
ERYTHROCYTE [DISTWIDTH] IN BLOOD BY AUTOMATED COUNT: 12.7 % (ref 12.3–15.4)
GLUCOSE SERPL-MCNC: 329 MG/DL (ref 65–99)
GLUCOSE UR STRIP-MCNC: ABNORMAL MG/DL
HCT VFR BLD AUTO: 50.8 % (ref 37.5–51)
HGB BLD-MCNC: 15.9 G/DL (ref 13–17.7)
HGB UR QL STRIP.AUTO: ABNORMAL
HYALINE CASTS UR QL AUTO: ABNORMAL /LPF
KETONES UR QL STRIP: NEGATIVE
LEUKOCYTE ESTERASE UR QL STRIP.AUTO: ABNORMAL
LYMPHOCYTES # BLD MANUAL: 4.55 10*3/MM3 (ref 0.7–3.1)
LYMPHOCYTES NFR BLD MANUAL: 4.2 % (ref 5–12)
MCH RBC QN AUTO: 28.5 PG (ref 26.6–33)
MCHC RBC AUTO-ENTMCNC: 31.3 G/DL (ref 31.5–35.7)
MCV RBC AUTO: 91 FL (ref 79–97)
MONOCYTES # BLD: 0.45 10*3/MM3 (ref 0.1–0.9)
NEUTROPHILS # BLD AUTO: 5.57 10*3/MM3 (ref 1.7–7)
NEUTROPHILS NFR BLD MANUAL: 51.6 % (ref 42.7–76)
NITRITE UR QL STRIP: POSITIVE
NRBC BLD AUTO-RTO: 0 /100 WBC (ref 0–0.2)
PH UR STRIP.AUTO: 6 [PH] (ref 5–8)
PHOSPHATE SERPL-MCNC: 2.2 MG/DL (ref 2.5–4.5)
PLAT MORPH BLD: NORMAL
PLATELET # BLD AUTO: 197 10*3/MM3 (ref 140–450)
PMV BLD AUTO: 10.3 FL (ref 6–12)
POTASSIUM SERPL-SCNC: 4.3 MMOL/L (ref 3.5–5.2)
PROT ?TM UR-MCNC: 28.8 MG/DL
PROT UR QL STRIP: ABNORMAL
PROT/CREAT UR: 0.25 MG/G{CREAT}
PTH-INTACT SERPL-MCNC: 53 PG/ML (ref 15–65)
RBC # BLD AUTO: 5.58 10*6/MM3 (ref 4.14–5.8)
RBC # UR STRIP: ABNORMAL /HPF
RBC MORPH BLD: NORMAL
REF LAB TEST METHOD: ABNORMAL
SMUDGE CELLS BLD QL SMEAR: ABNORMAL
SODIUM SERPL-SCNC: 136 MMOL/L (ref 136–145)
SP GR UR STRIP: 1.02 (ref 1–1.03)
SQUAMOUS #/AREA URNS HPF: ABNORMAL /HPF
UROBILINOGEN UR QL STRIP: ABNORMAL
VARIANT LYMPHS NFR BLD MANUAL: 2.1 % (ref 0–5)
VARIANT LYMPHS NFR BLD MANUAL: 40 % (ref 19.6–45.3)
WBC # UR STRIP: ABNORMAL /HPF
WBC CLUMPS # UR AUTO: PRESENT /HPF
WBC NRBC COR # BLD AUTO: 10.8 10*3/MM3 (ref 3.4–10.8)

## 2025-02-12 PROCEDURE — 85025 COMPLETE CBC W/AUTO DIFF WBC: CPT

## 2025-02-12 PROCEDURE — 36415 COLL VENOUS BLD VENIPUNCTURE: CPT

## 2025-02-12 PROCEDURE — 80069 RENAL FUNCTION PANEL: CPT

## 2025-02-12 PROCEDURE — 81001 URINALYSIS AUTO W/SCOPE: CPT

## 2025-02-12 PROCEDURE — 85007 BL SMEAR W/DIFF WBC COUNT: CPT

## 2025-02-12 PROCEDURE — 83970 ASSAY OF PARATHORMONE: CPT

## 2025-02-12 PROCEDURE — 84156 ASSAY OF PROTEIN URINE: CPT

## 2025-02-12 PROCEDURE — 82570 ASSAY OF URINE CREATININE: CPT

## 2025-02-12 PROCEDURE — 82306 VITAMIN D 25 HYDROXY: CPT

## 2025-06-09 ENCOUNTER — HOSPITAL ENCOUNTER (EMERGENCY)
Facility: HOSPITAL | Age: 75
Discharge: HOME | End: 2025-06-09
Attending: EMERGENCY MEDICINE | Admitting: EMERGENCY MEDICINE
Payer: OTHER GOVERNMENT

## 2025-06-09 VITALS
BODY MASS INDEX: 40.43 KG/M2 | DIASTOLIC BLOOD PRESSURE: 88 MMHG | HEIGHT: 74 IN | RESPIRATION RATE: 18 BRPM | WEIGHT: 315 LBS | HEART RATE: 93 BPM | OXYGEN SATURATION: 94 % | SYSTOLIC BLOOD PRESSURE: 145 MMHG | TEMPERATURE: 98 F

## 2025-06-09 DIAGNOSIS — N39.0 ACUTE UTI: ICD-10-CM

## 2025-06-09 DIAGNOSIS — E11.51 TYPE 2 DIABETES WITH ATHEROSCLEROSIS OF ARTERIES OF EXTREMITIES: Primary | ICD-10-CM

## 2025-06-09 DIAGNOSIS — I70.209 TYPE 2 DIABETES WITH ATHEROSCLEROSIS OF ARTERIES OF EXTREMITIES: Primary | ICD-10-CM

## 2025-06-09 LAB
ACETONE BLD QL: NEGATIVE
ALBUMIN SERPL-MCNC: 3.9 G/DL (ref 3.5–5.2)
ALBUMIN/GLOB SERPL: 1.6 G/DL
ALP SERPL-CCNC: 85 U/L (ref 39–117)
ALT SERPL W P-5'-P-CCNC: 20 U/L (ref 1–41)
ANION GAP SERPL CALCULATED.3IONS-SCNC: 12.1 MMOL/L (ref 5–15)
AST SERPL-CCNC: 12 U/L (ref 1–40)
ATMOSPHERIC PRESS: 737.5 MMHG
BACTERIA UR QL AUTO: ABNORMAL /HPF
BASE EXCESS BLDV CALC-SCNC: -0.6 MMOL/L (ref -2–2)
BASOPHILS # BLD AUTO: 0.07 10*3/MM3 (ref 0–0.2)
BASOPHILS NFR BLD AUTO: 0.6 % (ref 0–1.5)
BILIRUB SERPL-MCNC: 0.3 MG/DL (ref 0–1.2)
BILIRUB UR QL STRIP: NEGATIVE
BUN SERPL-MCNC: 24.6 MG/DL (ref 8–23)
BUN/CREAT SERPL: 18.5 (ref 7–25)
CALCIUM SPEC-SCNC: 9.3 MG/DL (ref 8.6–10.5)
CHLORIDE SERPL-SCNC: 103 MMOL/L (ref 98–107)
CLARITY UR: ABNORMAL
CO2 SERPL-SCNC: 20.9 MMOL/L (ref 22–29)
COLOR UR: YELLOW
CREAT SERPL-MCNC: 1.33 MG/DL (ref 0.76–1.27)
DEPRECATED RDW RBC AUTO: 41.9 FL (ref 37–54)
EGFRCR SERPLBLD CKD-EPI 2021: 56.4 ML/MIN/1.73
EOSINOPHIL # BLD AUTO: 0.29 10*3/MM3 (ref 0–0.4)
EOSINOPHIL NFR BLD AUTO: 2.6 % (ref 0.3–6.2)
ERYTHROCYTE [DISTWIDTH] IN BLOOD BY AUTOMATED COUNT: 12.9 % (ref 12.3–15.4)
GLOBULIN UR ELPH-MCNC: 2.4 GM/DL
GLUCOSE BLDC GLUCOMTR-MCNC: 233 MG/DL (ref 70–99)
GLUCOSE BLDC GLUCOMTR-MCNC: 253 MG/DL (ref 70–99)
GLUCOSE BLDC GLUCOMTR-MCNC: 256 MG/DL (ref 70–99)
GLUCOSE BLDC GLUCOMTR-MCNC: 371 MG/DL (ref 70–99)
GLUCOSE BLDC GLUCOMTR-MCNC: 408 MG/DL (ref 70–99)
GLUCOSE SERPL-MCNC: 252 MG/DL (ref 65–99)
GLUCOSE UR STRIP-MCNC: ABNORMAL MG/DL
HCO3 BLDV-SCNC: 25 MMOL/L (ref 22–26)
HCT VFR BLD AUTO: 51.6 % (ref 37.5–51)
HGB BLD-MCNC: 16.9 G/DL (ref 13–17.7)
HGB BLDA-MCNC: 18.6 G/DL (ref 12–18)
HGB UR QL STRIP.AUTO: ABNORMAL
HOLD SPECIMEN: NORMAL
HOLD SPECIMEN: NORMAL
HYALINE CASTS UR QL AUTO: ABNORMAL /LPF
IMM GRANULOCYTES # BLD AUTO: 0.05 10*3/MM3 (ref 0–0.05)
IMM GRANULOCYTES NFR BLD AUTO: 0.4 % (ref 0–0.5)
INHALED O2 CONCENTRATION: 21 %
KETONES UR QL STRIP: NEGATIVE
LEUKOCYTE ESTERASE UR QL STRIP.AUTO: ABNORMAL
LYMPHOCYTES # BLD AUTO: 5.8 10*3/MM3 (ref 0.7–3.1)
LYMPHOCYTES NFR BLD AUTO: 51.9 % (ref 19.6–45.3)
MCH RBC QN AUTO: 28.9 PG (ref 26.6–33)
MCHC RBC AUTO-ENTMCNC: 32.8 G/DL (ref 31.5–35.7)
MCV RBC AUTO: 88.2 FL (ref 79–97)
MODALITY: ABNORMAL
MONOCYTES # BLD AUTO: 0.58 10*3/MM3 (ref 0.1–0.9)
MONOCYTES NFR BLD AUTO: 5.2 % (ref 5–12)
NEUTROPHILS NFR BLD AUTO: 39.3 % (ref 42.7–76)
NEUTROPHILS NFR BLD AUTO: 4.38 10*3/MM3 (ref 1.7–7)
NITRITE UR QL STRIP: NEGATIVE
NRBC BLD AUTO-RTO: 0 /100 WBC (ref 0–0.2)
OSMOLALITY SERPL: 311 MOSM/KG (ref 280–301)
PCO2 BLDV: 43.1 MM HG (ref 41–51)
PH BLDV: 7.37 PH UNITS (ref 7.31–7.41)
PH UR STRIP.AUTO: 5.5 [PH] (ref 5–8)
PLATELET # BLD AUTO: 129 10*3/MM3 (ref 140–450)
PMV BLD AUTO: 12 FL (ref 6–12)
PO2 BLDV: 56.2 MM HG (ref 35–42)
POTASSIUM SERPL-SCNC: 4.2 MMOL/L (ref 3.5–5.2)
PROT SERPL-MCNC: 6.3 G/DL (ref 6–8.5)
PROT UR QL STRIP: NEGATIVE
RBC # BLD AUTO: 5.85 10*6/MM3 (ref 4.14–5.8)
RBC # UR STRIP: ABNORMAL /HPF
RBC MORPH BLD: NORMAL
REF LAB TEST METHOD: ABNORMAL
SAO2 % BLDCOV: 87.8 % (ref 45–75)
SMALL PLATELETS BLD QL SMEAR: NORMAL
SODIUM SERPL-SCNC: 136 MMOL/L (ref 136–145)
SP GR UR STRIP: 1.02 (ref 1–1.03)
SQUAMOUS #/AREA URNS HPF: ABNORMAL /HPF
UROBILINOGEN UR QL STRIP: ABNORMAL
WBC # UR STRIP: ABNORMAL /HPF
WBC MORPH BLD: NORMAL
WBC NRBC COR # BLD AUTO: 11.17 10*3/MM3 (ref 3.4–10.8)
WHOLE BLOOD HOLD COAG: NORMAL
WHOLE BLOOD HOLD SPECIMEN: NORMAL

## 2025-06-09 PROCEDURE — 82803 BLOOD GASES ANY COMBINATION: CPT | Performed by: EMERGENCY MEDICINE

## 2025-06-09 PROCEDURE — 82948 REAGENT STRIP/BLOOD GLUCOSE: CPT

## 2025-06-09 PROCEDURE — 81001 URINALYSIS AUTO W/SCOPE: CPT | Performed by: EMERGENCY MEDICINE

## 2025-06-09 PROCEDURE — 82948 REAGENT STRIP/BLOOD GLUCOSE: CPT | Performed by: EMERGENCY MEDICINE

## 2025-06-09 PROCEDURE — 80053 COMPREHEN METABOLIC PANEL: CPT | Performed by: EMERGENCY MEDICINE

## 2025-06-09 PROCEDURE — 83930 ASSAY OF BLOOD OSMOLALITY: CPT | Performed by: EMERGENCY MEDICINE

## 2025-06-09 PROCEDURE — 25810000003 SODIUM CHLORIDE 0.9 % SOLUTION: Performed by: EMERGENCY MEDICINE

## 2025-06-09 PROCEDURE — 82009 KETONE BODYS QUAL: CPT | Performed by: EMERGENCY MEDICINE

## 2025-06-09 PROCEDURE — 63710000001 INSULIN REGULAR HUMAN PER 5 UNITS: Performed by: EMERGENCY MEDICINE

## 2025-06-09 PROCEDURE — 99283 EMERGENCY DEPT VISIT LOW MDM: CPT

## 2025-06-09 PROCEDURE — 85025 COMPLETE CBC W/AUTO DIFF WBC: CPT | Performed by: EMERGENCY MEDICINE

## 2025-06-09 PROCEDURE — 85007 BL SMEAR W/DIFF WBC COUNT: CPT | Performed by: EMERGENCY MEDICINE

## 2025-06-09 RX ORDER — ONDANSETRON 4 MG/1
4 TABLET, ORALLY DISINTEGRATING ORAL EVERY 6 HOURS PRN
Qty: 15 TABLET | Refills: 0 | Status: SHIPPED | OUTPATIENT
Start: 2025-06-09

## 2025-06-09 RX ORDER — SODIUM CHLORIDE 0.9 % (FLUSH) 0.9 %
10 SYRINGE (ML) INJECTION AS NEEDED
Status: DISCONTINUED | OUTPATIENT
Start: 2025-06-09 | End: 2025-06-10 | Stop reason: HOSPADM

## 2025-06-09 RX ORDER — CEPHALEXIN 500 MG/1
500 CAPSULE ORAL 3 TIMES DAILY
Qty: 15 CAPSULE | Refills: 0 | Status: SHIPPED | OUTPATIENT
Start: 2025-06-09 | End: 2025-06-14

## 2025-06-09 RX ADMIN — CEPHALEXIN 500 MG: 250 CAPSULE ORAL at 21:18

## 2025-06-09 RX ADMIN — SODIUM CHLORIDE 1000 ML: 9 INJECTION, SOLUTION INTRAVENOUS at 19:39

## 2025-06-09 RX ADMIN — INSULIN HUMAN 7 UNITS: 100 INJECTION, SOLUTION PARENTERAL at 19:36

## 2025-06-09 NOTE — ED PROVIDER NOTES
"Time: 7:12 PM EDT  Date of encounter:  6/9/2025  Independent Historian/Clinical History and Information was obtained by:   Patient    History is limited by: N/A    Chief Complaint: High blood sugar      History of Present Illness:  Patient is a 74 y.o. year old male who presents to the emergency department for evaluation of hyperglycemia.  Patient newly started insulin today.  States that his blood sugars running extremely high for him.  He did eat 3 to 4 slices of pizza this morning for breakfast.  No other complaints but does note that he has been battling a \"sinus infection\" for the past 4 days.  Afebrile.  No fevers, chills, body aches.      Patient Care Team  Primary Care Provider: Cassie Osuna PA    Past Medical History:     Allergies   Allergen Reactions    Latex Rash    Saccharin Nausea And Vomiting    Tuna Oil [Fish Oil] Nausea And Vomiting     Past Medical History:   Diagnosis Date    Anesthesia     \"TROUBLE COMING OUT OF ANESTHESIA\" PT STATES HE HAS TROUBLE BREATHING    Arthritis     NECK SPINE    COPD (chronic obstructive pulmonary disease)     NO MEDS/INHALERS, SOA E    Coronary artery disease     NO STENTS/BYPASSES DONE, [facility]    DDD (degenerative disc disease), cervical     DM2 (diabetes mellitus, type 2)     ORAL MEDS/CHECKS BS, AVE     Flesh-eating bacteria     L LEG LED TO AMPUTATION NO CURRENT ISSUES    GERD (gastroesophageal reflux disease)     Heart attack     MI 2/2016, CLEARED BY CARDIOLOGY, PCP (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, WEAK L ARM    Tachycardia     NO MEDS CURRENTLY ASYMPTOMATIC, DENIES CP, SOAE    Uric acid kidney stone 09/25/2019     Past Surgical History:   Procedure Laterality Date    ABDOMINAL SURGERY  2004    UMBILICAL HERNIA REPAIR     AMPUTATION Left     above the knee    COLONOSCOPY      CYSTOSCOPY URETEROSCOPY Left 06/28/2021    " Procedure: CYSTOSCOPY URETEROSCOPY RETROGRADE PYELOGRAM STENT EXCHANGE LEFT;  Surgeon: Sarah Palacios MD;  Location: [facility] MAIN OR;  Service: Urology;  Laterality: Left;    CYSTOSCOPY W/ URETERAL STENT PLACEMENT Left 09/22/2023    Procedure: CYSTOSCOPY URETERAL CATHETER/STENT INSERTION;  Surgeon: Brayden Medrano MD;  Location: [facility] MAIN OR;  Service: Urology;  Laterality: Left;    CYSTOSCOPY W/ URETERAL STENT PLACEMENT Left 11/27/2023    Procedure: CYSTOSCOPY URETERAL STENT REMOVAL, left;  Surgeon: Sarah Palacios MD;  Location: [facility] MAIN OR;  Service: Urology;  Laterality: Left;    ENDOSCOPY      x2    FOOT SURGERY Right     CARTLEGE REPAIR    KIDNEY STONE SURGERY      STENT INPLACED    KNEE SURGERY Right     ARTHROSCOPE    MOUTH SURGERY      TEETH EXTRACTED    URETEROSCOPY LASER LITHOTRIPSY WITH STENT INSERTION Left 11/7/2023    Procedure: CYSTOSCOPY URETEROSCOPY RETROGRADE PYELOGRAM HOLMIUM LASER STENT EXCHANGE  left;  Surgeon: Sarah Palacios MD;  Location: [facility] MAIN OR;  Service: Urology;  Laterality: Left;     Family History   Problem Relation Age of Onset    Hypertension Mother     Leukemia Mother     Heart attack Father     Prostate cancer Father         60    Malig Hyperthermia Neg Hx        Home Medications:  Prior to Admission medications    Medication Sig Start Date End Date Taking? Authorizing Provider   albuterol sulfate  (90 Base) MCG/ACT inhaler Inhale 2 puffs Every 4 (Four) Hours As Needed for Wheezing or Shortness of Air. 1/30/24   Hortencia Cooper PA-C   aspirin 325 MG tablet Take 1 tablet by mouth 2 (Two) Times a Day. LAST DOSE 11/6/23 AM  Patient taking differently: Take 1 tablet by mouth Daily. Last dose 11-23-23 heart health 11/9/23   Sarah Palacios MD   azithromycin (Zithromax Z-Jose) 250 MG tablet Take 2 tablets by mouth on day 1, then 1 tablet daily on days 2-5 1/30/24   Hortencia Cooper PA-C   glipizide (GLUCOTROL) 5 MG tablet Take 1 tablet by mouth 2 (Two) Times a  Day Before Meals. LAST DOSE 11/6/23 PM    Mart Kearney MD   glucose (DEX4) 4 GM chewable tablet Chew 4 tablets As Needed for Low Blood Sugar. 10/6/23   Mart Kearney MD   lisinopril (PRINIVIL,ZESTRIL) 10 MG tablet Take 0.5 tablets by mouth Daily. 1/26/22   Mart Kearney MD   metFORMIN ER (GLUCOPHAGE-XR) 500 MG 24 hr tablet Take 2 tablets by mouth 2 (Two) Times a Day. Take no later than 6:00 PM the night before surgery. 6/30/21   Mart Kearney MD   Omega-3 Fatty Acids (fish oil) 1000 MG capsule capsule Take 3 capsules by mouth Daily With Breakfast.    Mart Kearney MD   oxyCODONE-acetaminophen (Percocet) 5-325 MG per tablet Take 1-2 tablets by mouth Every 6 (Six) Hours As Needed for Severe Pain.  Patient not taking: Reported on 1/10/2024 11/7/23   Sarah Palacios MD   Scopolamine 1 MG/3DAYS patch  12/18/23   Mart Kearney MD   tamsulosin (FLOMAX) 0.4 MG capsule 24 hr capsule Take 2 capsules by mouth Daily.    Mart Kearney MD   Trulicity 0.75 MG/0.5ML solution pen-injector Will not start until after surgery 11/18/23   Mart Kearney MD   vitamin D (ERGOCALCIFEROL) 1.25 MG (67697 UT) capsule capsule Take 1 capsule by mouth Every 7 (Seven) Days. Fridays    Mart Kearney MD   vitamin D (ERGOCALCIFEROL) 1.25 MG (69985 UT) capsule capsule Take 1 capsule by mouth 1 (One) Time Per Week. 10/10/23   Mart Kearney MD        Social History:   Social History     Tobacco Use    Smoking status: Never    Smokeless tobacco: Never   Vaping Use    Vaping status: Never Used   Substance Use Topics    Alcohol use: Never    Drug use: Not Currently         Review of Systems:  Review of Systems   Constitutional:  Negative for chills and fever.   HENT:  Positive for congestion and sinus pressure. Negative for rhinorrhea and sore throat.    Eyes:  Negative for photophobia.   Respiratory:  Negative for apnea, cough, chest tightness and shortness of breath.   "  Cardiovascular:  Negative for chest pain and palpitations.   Gastrointestinal:  Negative for abdominal pain, diarrhea, nausea and vomiting.   Endocrine: Negative.    Genitourinary:  Negative for difficulty urinating and dysuria.   Musculoskeletal:  Negative for back pain, joint swelling and myalgias.   Skin:  Negative for color change and wound.   Allergic/Immunologic: Negative.    Neurological:  Negative for seizures and headaches.   Psychiatric/Behavioral: Negative.     All other systems reviewed and are negative.       Physical Exam:  /88 (BP Location: Right arm, Patient Position: Sitting)   Pulse 93   Temp 98 °F (36.7 °C) (Oral)   Resp 18   Ht 188 cm (74\")   Wt (!) 154 kg (340 lb 6.2 oz)   SpO2 94%   BMI 43.70 kg/m²     Physical Exam  Vitals and nursing note reviewed.   Constitutional:       General: He is awake.      Appearance: Normal appearance.   HENT:      Head: Normocephalic and atraumatic.      Nose: Nose normal.      Mouth/Throat:      Mouth: Mucous membranes are moist.   Eyes:      Extraocular Movements: Extraocular movements intact.      Pupils: Pupils are equal, round, and reactive to light.   Cardiovascular:      Rate and Rhythm: Normal rate and regular rhythm.      Heart sounds: Normal heart sounds.   Pulmonary:      Effort: Pulmonary effort is normal. No respiratory distress.      Breath sounds: Normal breath sounds. No wheezing, rhonchi or rales.   Abdominal:      General: Bowel sounds are normal.      Palpations: Abdomen is soft.      Tenderness: There is no abdominal tenderness. There is no guarding or rebound.      Comments: No rigidity   Musculoskeletal:         General: No tenderness. Normal range of motion.      Cervical back: Normal range of motion and neck supple.   Skin:     General: Skin is warm and dry.      Coloration: Skin is not jaundiced.   Neurological:      General: No focal deficit present.      Mental Status: He is alert. Mental status is at baseline. "   Psychiatric:         Mood and Affect: Mood normal.                    Medical Decision Making:      Comorbidities that affect care:    Type 2 diabetes, COPD, GERD, stroke    External Notes reviewed:    Previous Clinic Note: Nephrology office visit 2/13/2025.  Description: Stage IIIb CKD      The following orders were placed and all results were independently analyzed by me:  Orders Placed This Encounter   Procedures    [facility] Draw    Comprehensive Metabolic Panel    Urinalysis With Microscopic If Indicated (No Culture) - Urine, Clean Catch    Blood Gas, Venous -    Acetone    CBC Auto Differential    Osmolality, Serum    Scan Slide    Urinalysis, Microscopic Only - Urine, Clean Catch    NPO Diet NPO Type: Strict NPO    Undress & Gown    Continuous Pulse Oximetry    Vital Signs    Oxygen Therapy- Nasal Cannula; Titrate 1-6 LPM Per SpO2; 90 - 95%    POC Glucose Once    POC Glucose Q1H    POC Glucose Once    POC Glucose Once    Insert Peripheral IV    CBC & Differential    Green Top (Gel)    Lavender Top    Gold Top - SST    Light Blue Top       Medications Given in the Emergency Department:  Medications   sodium chloride 0.9 % flush 10 mL (has no administration in time range)   sodium chloride 0.9 % bolus 1,000 mL (0 mL Intravenous Stopped 6/9/25 2103)   insulin regular (humuLIN R,novoLIN R) injection 7 Units (7 Units Intravenous Given 6/9/25 1936)   cephalexin (KEFLEX) capsule 500 mg (500 mg Oral Given 6/9/25 2118)        ED Course:         Labs:    Lab Results (last 24 hours)       Procedure Component Value Units Date/Time    POC Glucose Once [[id number]]  (Abnormal) Collected: 06/09/25 1830    Specimen: Blood Updated: 06/09/25 1831     Glucose 408 mg/dL      Comment: Serial Number: [id number]:  [id number]       CBC & Differential [[id number]]  (Abnormal) Collected: 06/09/25 1853    Specimen: Blood Updated: 06/09/25 2023    Narrative:      The following orders were created for panel order CBC &  Differential.  Procedure                               Abnormality         Status                     ---------                               -----------         ------                     CBC Auto Differential[[id number]]        Abnormal            Final result               Scan Slide[[id number]]                                       Final result                 Please view results for these tests on the individual orders.    CBC Auto Differential [[id number]]  (Abnormal) Collected: 06/09/25 1853    Specimen: Blood Updated: 06/09/25 2023     WBC 11.17 10*3/mm3      RBC 5.85 10*6/mm3      Hemoglobin 16.9 g/dL      Hematocrit 51.6 %      MCV 88.2 fL      MCH 28.9 pg      MCHC 32.8 g/dL      RDW 12.9 %      RDW-SD 41.9 fl      MPV 12.0 fL      Platelets 129 10*3/mm3      Neutrophil % 39.3 %      Lymphocyte % 51.9 %      Monocyte % 5.2 %      Eosinophil % 2.6 %      Basophil % 0.6 %      Immature Grans % 0.4 %      Neutrophils, Absolute 4.38 10*3/mm3      Lymphocytes, Absolute 5.80 10*3/mm3      Monocytes, Absolute 0.58 10*3/mm3      Eosinophils, Absolute 0.29 10*3/mm3      Basophils, Absolute 0.07 10*3/mm3      Immature Grans, Absolute 0.05 10*3/mm3      nRBC 0.0 /100 WBC     Narrative:      Appended report. These results have been appended to a previously verified report.    Osmolality, Serum [[id number]]  (Abnormal) Collected: 06/09/25 1853    Specimen: Blood Updated: 06/09/25 2003     Osmolality 311 mOsm/kg     Scan Slide [[id number]] Collected: 06/09/25 1853    Specimen: Blood Updated: 06/09/25 2023     RBC Morphology Normal     WBC Morphology Normal     Platelet Estimate Decreased    Blood Gas, Venous - [[id number]]  (Abnormal) Collected: 06/09/25 1918    Specimen: Venous Blood Updated: 06/09/25 1921     pH, Venous 7.371 pH Units      pCO2, Venous 43.1 mm Hg      pO2, Venous 56.2 mm Hg      HCO3, Venous 25.0 mmol/L      Base Excess, Venous -0.6 mmol/L      Comment: Serial Number: [id number]:  [id number]        O2  Saturation, Venous 87.8 %      Hemoglobin, Blood Gas 18.6 g/dL      Barometric Pressure for Blood Gas 737.5000 mmHg      Modality Room Air     FIO2 21 %     Urinalysis With Microscopic If Indicated (No Culture) - Urine, Clean Catch [[id number]]  (Abnormal) Collected: 06/09/25 1931    Specimen: Urine, Clean Catch Updated: 06/09/25 2002     Color, UA Yellow     Appearance, UA Cloudy     pH, UA 5.5     Specific Gravity, UA 1.023     Glucose, UA >=1000 mg/dL (3+)     Ketones, UA Negative     Bilirubin, UA Negative     Blood, UA Trace     Protein, UA Negative     Leuk Esterase, UA Moderate (2+)     Nitrite, UA Negative     Urobilinogen, UA 1.0 E.U./dL    Urinalysis, Microscopic Only - Urine, Clean Catch [[id number]]  (Abnormal) Collected: 06/09/25 1931    Specimen: Urine, Clean Catch Updated: 06/09/25 2002     RBC, UA 0-2 /HPF      WBC, UA Too Numerous to Count /HPF      Bacteria, UA 4+ /HPF      Squamous Epithelial Cells, UA None Seen /HPF      Hyaline Casts, UA None Seen /LPF      Methodology Automated Microscopy    POC Glucose Once [[id number]]  (Abnormal) Collected: 06/09/25 1935    Specimen: Blood Updated: 06/09/25 1936     Glucose 371 mg/dL      Comment: Serial Number: [id number]:  [id number]       POC Glucose Q1H [[id number]]  (Abnormal) Collected: 06/09/25 2017    Specimen: Blood Updated: 06/09/25 2018     Glucose 256 mg/dL      Comment: Serial Number: [id number]:  [id number]       Comprehensive Metabolic Panel [[id number]]  (Abnormal) Collected: 06/09/25 2103    Specimen: Blood Updated: 06/09/25 2233     Glucose 252 mg/dL      BUN 24.6 mg/dL      Creatinine 1.33 mg/dL      Sodium 136 mmol/L      Potassium 4.2 mmol/L      Comment: Slight hemolysis detected by analyzer. Result may be falsely elevated.        Chloride 103 mmol/L      CO2 20.9 mmol/L      Calcium 9.3 mg/dL      Total Protein 6.3 g/dL      Albumin 3.9 g/dL      ALT (SGPT) 20 U/L      AST (SGOT) 12 U/L      Alkaline Phosphatase 85 U/L       Total Bilirubin 0.3 mg/dL      Globulin 2.4 gm/dL      A/G Ratio 1.6 g/dL      BUN/Creatinine Ratio 18.5     Anion Gap 12.1 mmol/L      eGFR 56.4 mL/min/1.73     Narrative:      GFR Categories in Chronic Kidney Disease (CKD)              GFR Category          GFR (mL/min/1.73)    Interpretation  G1                    90 or greater        Normal or high (1)  G2                    60-89                Mild decrease (1)  G3a                   45-59                Mild to moderate decrease  G3b                   30-44                Moderate to severe decrease  G4                    15-29                Severe decrease  G5                    14 or less           Kidney failure    (1)In the absence of evidence of kidney disease, neither GFR category G1 or G2 fulfill the criteria for CKD.    eGFR calculation 2021 CKD-EPI creatinine equation, which does not include race as a factor    Acetone [[id number]]  (Normal) Collected: 06/09/25 2103    Specimen: Blood Updated: 06/09/25 2226     Acetone Negative    POC Glucose Once [[id number]]  (Abnormal) Collected: 06/09/25 2121    Specimen: Blood Updated: 06/09/25 2122     Glucose 253 mg/dL      Comment: Serial Number: [id number]:  [id number]       POC Glucose Q1H [[id number]]  (Abnormal) Collected: 06/09/25 2226    Specimen: Blood Updated: 06/09/25 2228     Glucose 233 mg/dL      Comment: Serial Number: [id number]:  [id number]                Imaging:    No Radiology Exams Resulted Within Past 24 Hours      Differential Diagnosis and Discussion:    Metabolic: Differential diagnosis includes but is not limited to hypertension, hyperglycemia, hyperkalemia, hypocalcemia, metabolic acidosis, hypokalemia, hypoglycemia, malnutrition, hypothyroidism, hyperthyroidism, and adrenal insufficiency.     PROCEDURES:    Labs were collected in the emergency department and all labs were reviewed and interpreted by me.    No orders to display       Procedures    MDM                      Patient Care Considerations:    SEPSIS was considered but is NOT present in the emergency department as SIRS criteria is not present.      Consultants/Shared Management Plan:    None    Social Determinants of Health:    Patient is independent, reliable, and has access to care.       Disposition and Care Coordination:    Discharged: I considered escalation of care by admitting this patient to the hospital, however patient is not in DKA.    I have explained the patient´s condition, diagnoses and treatment plan based on the information available to me at this time. I have answered questions and addressed any concerns. The patient has a good  understanding of the patient´s diagnosis, condition, and treatment plan as can be expected at this point. The vital signs have been stable. The patient´s condition is stable and appropriate for discharge from the emergency department.      The patient will pursue further outpatient evaluation with the primary care physician or other designated or consulting physician as outlined in the discharge instructions. They are agreeable to this plan of care and follow-up instructions have been explained in detail. The patient has received these instructions in written format and has expressed an understanding of the discharge instructions. The patient is aware that any significant change in condition or worsening of symptoms should prompt an immediate return to this or the closest emergency department or call to 911.    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             Dean Hernandez MD  06/09/25 [id number]

## 2025-06-10 NOTE — DISCHARGE INSTRUCTIONS
Stay well-hydrated.  We did not find that urinary tract infection today which can increase your blood sugar.  In the next 5 to 7 days, I would recommend you follow a very strict diabetic diet.  Call your family doctor tomorrow for follow-up.  Return to the ER immediately for worsening of symptoms.

## (undated) DEVICE — SHEATH URETRL ACC UROPASS 12/14F 38CM

## (undated) DEVICE — SKIN PREP TRAY W/CHG: Brand: MEDLINE INDUSTRIES, INC.

## (undated) DEVICE — CYSTO PACK: Brand: MEDLINE INDUSTRIES, INC.

## (undated) DEVICE — TOWEL,OR,DSP,ST,BLUE,STD,4/PK,20PK/CS: Brand: MEDLINE

## (undated) DEVICE — SOL IRRG H2O BG 3000ML STRL

## (undated) DEVICE — Device

## (undated) DEVICE — NITINOL STONE RETRIEVAL BASKET: Brand: ESCAPE

## (undated) DEVICE — CYSTO/BLADDER IRRIGATION SET, REGULATING CLAMP

## (undated) DEVICE — GOWN,REINFORCE,POLY,SIRUS,BREATH SLV,XLG: Brand: MEDLINE

## (undated) DEVICE — GW ULTRATRACK HYBRID STR/TP .035IN 3X150CM

## (undated) DEVICE — BASIC SINGLE BASIN-LF: Brand: MEDLINE INDUSTRIES, INC.

## (undated) DEVICE — Y-TYPE TUR/BLADDER IRRIGATION SET, REGULATING CLAMP

## (undated) DEVICE — GLV SURG SENSICARE SLT PF LF 7 STRL

## (undated) DEVICE — GW SUREGLIDE FLXTIP ANG/TP .038 3X150CM EA/5

## (undated) DEVICE — SYS IRR PUMP SGL ACTN VAC SYR 10CC

## (undated) DEVICE — NITINOL WIRE WITH HYDROPHILIC TIP: Brand: SENSOR

## (undated) DEVICE — CATH URETRL FLXITP POLLACK STD 5F 70CM

## (undated) DEVICE — SOL IRR NACL 0.9PCT 3000ML

## (undated) DEVICE — FIBR LASR HOLMIUM COMPAT 272MH DISP

## (undated) DEVICE — CATH URETRL OPEN END W/CONNECT 5F 70CM

## (undated) DEVICE — TRY PREP SCRB VAG PVP

## (undated) DEVICE — ENDOSCOPIC VALVE WITH ADAPTER.: Brand: SURSEAL® II

## (undated) DEVICE — URETERAL ACCESS SHEATH SET: Brand: NAVIGATOR HD

## (undated) DEVICE — GLV SURG SENSICARE SLT PF LF 8 STRL

## (undated) DEVICE — GW ULTRATRACK HYBRID STR/TP .035IN 3X150CM EA/5